# Patient Record
Sex: MALE | Race: WHITE | NOT HISPANIC OR LATINO | Employment: OTHER | URBAN - METROPOLITAN AREA
[De-identification: names, ages, dates, MRNs, and addresses within clinical notes are randomized per-mention and may not be internally consistent; named-entity substitution may affect disease eponyms.]

---

## 2017-03-21 ENCOUNTER — PATIENT OUTREACH (OUTPATIENT)
Dept: HEALTH INFORMATION MANAGEMENT | Facility: OTHER | Age: 72
End: 2017-03-21

## 2017-03-21 NOTE — PROGRESS NOTES
Attempt #: 1    Verify PCP: yes    Communication Preference Obtained: yes     Annual Wellness Visit Scheduling  1. Scheduling Status:Scheduled       Care Gap Scheduling (Attempt to Schedule EACH Overdue Care Gap!)     Health Maintenance Due   Topic Date Due   • IMM ZOSTER VACCINE  SCHEDULED   • IMM DTaP/Tdap/Td Vaccine (1 - Tdap) SCHEDULED   • COLONOSCOPY  ALREADY HAS REFERRAL, GAVE [facility] PHONE NUMBER TO PATIENT   • IMM PNEUMOCOCCAL 65+ (ADULT) LOW/MEDIUM RISK SERIES (2 of 2 - PCV13) SCHEDULED         [employer] Activation: sent activation code  Soicos Do: no  Virtual Visits: no  Opt In to Text Messages: no

## 2017-03-29 ENCOUNTER — OFFICE VISIT (OUTPATIENT)
Dept: MEDICAL GROUP | Age: 72
End: 2017-03-29
Payer: MEDICARE

## 2017-03-29 VITALS
HEIGHT: 68 IN | OXYGEN SATURATION: 96 % | TEMPERATURE: 97.2 F | WEIGHT: 193 LBS | BODY MASS INDEX: 29.25 KG/M2 | DIASTOLIC BLOOD PRESSURE: 82 MMHG | HEART RATE: 67 BPM | SYSTOLIC BLOOD PRESSURE: 140 MMHG

## 2017-03-29 DIAGNOSIS — Z12.11 SCREENING FOR COLON CANCER: ICD-10-CM

## 2017-03-29 DIAGNOSIS — Z00.00 PREVENTATIVE HEALTH CARE: ICD-10-CM

## 2017-03-29 DIAGNOSIS — Z00.00 MEDICARE ANNUAL WELLNESS VISIT, INITIAL: ICD-10-CM

## 2017-03-29 ASSESSMENT — PATIENT HEALTH QUESTIONNAIRE - PHQ9: CLINICAL INTERPRETATION OF PHQ2 SCORE: 0

## 2017-03-29 NOTE — ASSESSMENT & PLAN NOTE
Goes fishing and hunting outdoors  Does his own yardwork on an acre of land  Lives up stairs , does not want to change to downstairs  Lives with wife  Has 3 children, all visit in [facility]  Good friends near by

## 2017-03-29 NOTE — PROGRESS NOTES
This medical record contains text that has been entered with the assistance of computer voice recognition and dictation software.  Therefore, it may contain unintended errors in text, spelling, punctuation, or grammar    No chief complaint on file.      Alfonso Gaspar is a 71 y.o. male here evaluation and management of: annual wellness visit      HPI:     Preventative health care    The patient denied any chest pain, no sob, no monroy, no  pnd, no orthopnea, no headache, no changes in vision, no numbness or tingling, no nausea, no diarrhea, no abdominal pain, no fevers, no chills, no bright red blood per rectum, no  difficulty urinating, no burning during micturition, no depressed mood, no other concerns.  Goes fishing and hunting outdoors  Does his own yardwork on an acre of land  Lives up stairs , does not want to change to downstairs  Lives with wife  Has 3 children, all visit in [place] near by      Screening for colon cancer  He will have a colonoscopy next month    Medicare annual wellness visit, initial    Screening:  Depression Screening    Little interest or pleasure in doing things? Not at all  Feeling down, depressed , or hopeless? Not at all  Trouble falling or staying asleep, or sleeping too much? NO     Feeling tired or having little energy?  NO  Poor appetite or overeating?    Feeling bad about yourself - or that you are a failure or have let yourself or your family down?  NO  Trouble concentrating on things, such as reading the newspaper or watching television?  NO  Moving or speaking so slowly that other people could have noticed. Or the opposite - being so fidgety or restless that you have been moving around a lot more than usual?  NO  Thoughts that you would be better off dead, or of hurting yourself?  NO  Patient Health Questionnaire Score:  0    If depressive symptoms identified deferred to follow up visit unless specifically addressed in assesment and plan.      Screening for Cognitive  Impairment    Three Minute Recall (banana, sunrise, fence)  3/3   Draw clock face with all 12 numbers set to the hand to show 10 minures past 11 o'clock    Cognitive concerns identified defferred for follow up unless specifically addressed in assesment and plan.    Timed Up and Go Test    Time (seconds): 3    Safety Assessment    Throw rugs on floor.    Handrails on all stairs.    Good lighting in all hallways.    Difficulty hearing.    Patient counseled about all safety risks that were identified.    Functional Assessment ADLs    Are there any barriers preventing you from cooking for yourself or meeting nutritional needs?  . NONE  Are there any barriers preventing you from driving safely or obtaining transportation?  . NO  Are there any barriers preventing you from using a telephone or calling for help?  . NO  Are there any barriers preventing you from shopping?  . NO  Are there any barriers preventing you from taking care of your own finances? NO .   Are there any barriers preventing you from managing your medications?  . NO  Are currently engaing any exercise or physical activity?  . YES      Current medicines (including changes today)  Current Outpatient Prescriptions   Medication Sig Dispense Refill   • allopurinol (ZYLOPRIM) 300 MG Tab Take 1 Tab by mouth every day. TAKE 1 TAB BY MOUTH EVERY DAY. 90 Tab 3   • enalapril (VASOTEC) 10 MG Tab Take 1 Tab by mouth every day. 90 Tab 0   • Calcium Carbonate-Vitamin D (CALCIUM-D) 600-400 MG-UNIT Tab Take 2 Tabs by mouth every day.     • aspirin EC (ECOTRIN) 81 MG Tablet Delayed Response Take 81 mg by mouth every day.     • Multiple Vitamins-Minerals (MENS 50+ MULTI VITAMIN/MIN) Tab Take 1 Tab by mouth every day.     • Misc Natural Products (GLUCOSAMINE CHOND COMPLEX/MSM) Tab Take 2 Tabs by mouth every day.     • Omega-3 Fatty Acids (FISH OIL) 1000 MG Cap capsule Take 1,000 mg by mouth every day.     • clindamycin (CLEOCIN) 150 MG Cap Take 1 Cap by mouth 3 times a day.  "(Patient not taking: Reported on 2016) 40 Cap 0     No current facility-administered medications for this visit.     He  has a past medical history of Hypertension; Gout; and Gout.  He  has past surgical history that includes removal spleen, total and removal of tonsils,<12 y/o.  Social History   Substance Use Topics   • Smoking status: Former Smoker -- 0.25 packs/day for 10 years     Types: Cigarettes     Quit date: 1975   • Smokeless tobacco: Never Used   • Alcohol Use: 0.0 oz/week     0 Standard drinks or equivalent per week      Comment: occ     Social History     Social History Narrative     Family History   Problem Relation Age of Onset   • Diabetes Father    • No Known Problems Maternal Grandmother    • Cancer Maternal Grandfather      Lung cancer   • No Known Problems Paternal Grandmother    • No Known Problems Paternal Grandfather    • Other Sister      polio     Family Status   Relation Status Death Age   • Father  74     heart attack   • Mother     • Sister Alive    • Maternal Grandmother     • Maternal Grandfather     • Paternal Grandmother     • Paternal Grandfather     • Sister Alive          ROS  Please see hpi    All other systems reviewed and are negative     Objective:     Blood pressure 140/82, pulse 67, temperature 36.2 °C (97.2 °F), height 1.727 m (5' 7.99\"), weight 87.544 kg (193 lb), SpO2 96 %. Body mass index is 29.35 kg/(m^2).  Physical Exam:    Constitutional: Alert, no distress.  Skin: Warm, dry, good turgor, no rashes in visible areas.  Eye: Equal, round and reactive, conjunctiva clear, lids normal.  ENMT: Lips without lesions, good dentition, oropharynx clear.  Neck: Trachea midline, no masses, no thyromegaly. No cervical or supraclavicular lymphadenopathy.  Respiratory: Unlabored respiratory effort, lungs clear to auscultation, no wheezes, no ronchi.  Cardiovascular: Normal S1, S2, no murmur, no edema.  Abdomen: Soft, non-tender, " no masses, no hepatosplenomegaly.  Psych: Alert and oriented x3, normal affect and mood.          Assessment and Plan:   The following treatment plan was discussed, again this medical record contains text that has been entered with the assistance of computer voice recognition and dictation software.  Therefore, it may contain unintended errors in text, spelling, punctuation, or grammar    1. Medicare annual wellness visit, initial  Completed   Recommended that he move to downstairs room    2. Preventative health care  Due for AAA screening  And repeat labs  On a baby aspirin  Due for prevnar 13 and shingles    - COMP METABOLIC PANEL; Future  - CBC WITH DIFFERENTIAL; Future  - LIPID PROFILE; Future  - US-AORTA; Future    3. Screening for colon cancer  States that he is scheduled for next month              Followup: Return in about 3 months (around 6/29/2017) for Reevaluation.

## 2017-03-29 NOTE — MR AVS SNAPSHOT
"        Alfonso FULLER Hubert   3/29/2017 9:20 AM   Office Visit   MRN: [id number]    Department:  [address] [place] [address]   Dept Phone:  [phone]    Description:  Male : 1945   Provider:  Kim Greco M.D.           Allergies as of 3/29/2017     Allergen Noted Reactions    Pcn [Penicillins] 2011   Hives      You were diagnosed with     Preventative health care   [272118]       Screening for colon cancer   [948923]       Medicare annual wellness visit, initial   [406290]         Vital Signs     Blood Pressure Pulse Temperature Height Weight Body Mass Index    140/82 mmHg 67 36.2 °C (97.2 °F) 1.727 m (5' 7.99\") 87.544 kg (193 lb) 29.35 kg/m2    Oxygen Saturation Smoking Status                96% Former Smoker          Basic Information     Date Of Birth Sex Race Ethnicity Preferred Language    1945 Male White Non- English      Problem List              ICD-10-CM Priority Class Noted - Resolved    Essential hypertension, benign I10   3/26/2015 - Present    Gout M10.9   2015 - Present    Abdominal pain R10.9   2016 - Present    Screening for colon cancer Z12.11   2016 - Present    Preventative health care Z00.00   3/29/2017 - Present    Medicare annual wellness visit, initial Z00.00   3/29/2017 - Present      Health Maintenance        Date Due Completion Dates    IMM ZOSTER VACCINE 2004 ---    IMM DTaP/Tdap/Td Vaccine (1 - Tdap) 2011    COLONOSCOPY 2015    IMM PNEUMOCOCCAL 65+ (ADULT) LOW/MEDIUM RISK SERIES (2 of 2 - PCV13) 2016            Current Immunizations     Influenza TIV (IM) 2013, 2011    Influenza Vaccine Adult HD 10/4/2016, 2015    Pneumococcal polysaccharide vaccine (PPSV-23) 2015  1:02 PM    TD Vaccine 2011  2:05 PM      Below and/or attached are the medications your provider expects you to take. Review all of your home medications and newly ordered medications with your provider and/or " pharmacist. Follow medication instructions as directed by your provider and/or pharmacist. Please keep your medication list with you and share with your provider. Update the information when medications are discontinued, doses are changed, or new medications (including over-the-counter products) are added; and carry medication information at all times in the event of emergency situations     Allergies:  PCN - Hives               Medications  Valid as of: March 29, 2017 -  9:45 AM    Generic Name Brand Name Tablet Size Instructions for use    Allopurinol (Tab) ZYLOPRIM 300 MG Take 1 Tab by mouth every day. TAKE 1 TAB BY MOUTH EVERY DAY.        Aspirin (Tablet Delayed Response) ECOTRIN 81 MG Take 81 mg by mouth every day.        Calcium Carbonate-Vitamin D (Tab) Calcium-D 600-400 MG-UNIT Take 2 Tabs by mouth every day.        Clindamycin HCl (Cap) CLEOCIN 150 MG Take 1 Cap by mouth 3 times a day.        Enalapril Maleate (Tab) VASOTEC 10 MG Take 1 Tab by mouth every day.        Misc Natural Products (Tab) GLUCOSAMINE CHOND COMPLEX/MSM  Take 2 Tabs by mouth every day.        Multiple Vitamins-Minerals (Tab) MENS 50+ MULTI VITAMIN/MIN  Take 1 Tab by mouth every day.        Omega-3 Fatty Acids (Cap) fish oil 1000 MG Take 1,000 mg by mouth every day.        .                 Medicines prescribed today were sent to:     [facility] #[id number] - [place] - [address]    [address] [place] [address]    Phone: [phone] Fax: [phone]    Open 24 Hours?: No      Medication refill instructions:       If your prescription bottle indicates you have medication refills left, it is not necessary to call your provider’s office. Please contact your pharmacy and they will refill your medication.    If your prescription bottle indicates you do not have any refills left, you may request refills at any time through one of the following ways: The online [employer] system (except Urgent Care), by calling your provider’s  office, or by asking your pharmacy to contact your provider’s office with a refill request. Medication refills are processed only during regular business hours and may not be available until the next business day. Your provider may request additional information or to have a follow-up visit with you prior to refilling your medication.   *Please Note: Medication refills are assigned a new Rx number when refilled electronically. Your pharmacy may indicate that no refills were authorized even though a new prescription for the same medication is available at the pharmacy. Please request the medicine by name with the pharmacy before contacting your provider for a refill.        Your To Do List     Future Labs/Procedures Complete By Expires    CBC WITH DIFFERENTIAL  As directed 3/29/2018    COMP METABOLIC PANEL  As directed 3/29/2018    LIPID PROFILE  As directed 3/29/2018    US-AORTA  As directed 3/29/2018         [employer] Access Code: [email or web address]  Expires: 4/21/2017 11:52 AM    [employer]  A secure, online tool to manage your health information     [employer]’s [employer]® is a secure, online tool that connects you to your personalized health information from the privacy of your home -- day or night - making it very easy for you to manage your healthcare. Once the activation process is completed, you can even access your medical information using the [employer] do, which is available for free in the Apple Do store or Google Play store.     [employer] provides the following levels of access (as shown below):   My Chart Features   [facility] Primary Care Doctor [facility]  Specialists [facility]  Urgent  Care Non-[facility]  Primary Care  Doctor   Email your healthcare team securely and privately 24/7 X X X    Manage appointments: schedule your next appointment; view details of past/upcoming appointments X      Request prescription refills. X      View recent personal medical records, including lab and immunizations X X X X   View health  record, including health history, allergies, medications X X X X   Read reports about your outpatient visits, procedures, consult and ER notes X X X X   See your discharge summary, which is a recap of your hospital and/or ER visit that includes your diagnosis, lab results, and care plan. X X       How to register for [employer]:  1. Go to  [email or web address].  2. Click on the Sign Up Now box, which takes you to the New Member Sign Up page. You will need to provide the following information:  a. Enter your [employer] Access Code exactly as it appears at the top of this page. (You will not need to use this code after you’ve completed the sign-up process. If you do not sign up before the expiration date, you must request a new code.)   b. Enter your date of birth.   c. Enter your home email address.   d. Click Submit, and follow the next screen’s instructions.  3. Create a [employer] ID. This will be your [employer] login ID and cannot be changed, so think of one that is secure and easy to remember.  4. Create a [employer] password. You can change your password at any time.  5. Enter your Password Reset Question and Answer. This can be used at a later time if you forget your password.   6. Enter your e-mail address. This allows you to receive e-mail notifications when new information is available in [employer].  7. Click Sign Up. You can now view your health information.    For assistance activating your [employer] account, call [phone]

## 2017-03-29 NOTE — ASSESSMENT & PLAN NOTE
Screening:  Depression Screening    Little interest or pleasure in doing things? Not at all  Feeling down, depressed , or hopeless? Not at all  Trouble falling or staying asleep, or sleeping too much? NO     Feeling tired or having little energy?  NO  Poor appetite or overeating?    Feeling bad about yourself - or that you are a failure or have let yourself or your family down?  NO  Trouble concentrating on things, such as reading the newspaper or watching television?  NO  Moving or speaking so slowly that other people could have noticed. Or the opposite - being so fidgety or restless that you have been moving around a lot more than usual?  NO  Thoughts that you would be better off dead, or of hurting yourself?  NO  Patient Health Questionnaire Score:  0    If depressive symptoms identified deferred to follow up visit unless specifically addressed in assesment and plan.      Screening for Cognitive Impairment    Three Minute Recall (banana, sunrise, fence)  3/3   Draw clock face with all 12 numbers set to the hand to show 10 minures past 11 o'clock    Cognitive concerns identified defferred for follow up unless specifically addressed in assesment and plan.    Timed Up and Go Test    Time (seconds): 2    Safety Assessment    Throw rugs on floor.    Handrails on all stairs.    Good lighting in all hallways.    Difficulty hearing.    Patient counseled about all safety risks that were identified.    Functional Assessment ADLs    Are there any barriers preventing you from cooking for yourself or meeting nutritional needs?  . NONE  Are there any barriers preventing you from driving safely or obtaining transportation?  . NO  Are there any barriers preventing you from using a telephone or calling for help?  . NO  Are there any barriers preventing you from shopping?  . NO  Are there any barriers preventing you from taking care of your own finances? NO .   Are there any barriers preventing you from managing your  medications?  . NO  Are currently engaing any exercise or physical activity?  . YES

## 2017-04-07 ENCOUNTER — HOSPITAL ENCOUNTER (OUTPATIENT)
Dept: LAB | Facility: MEDICAL CENTER | Age: 72
End: 2017-04-07
Attending: FAMILY MEDICINE
Payer: MEDICARE

## 2017-04-07 DIAGNOSIS — Z00.00 PREVENTATIVE HEALTH CARE: ICD-10-CM

## 2017-04-07 LAB
ALBUMIN SERPL BCP-MCNC: 3.8 G/DL (ref 3.2–4.9)
ALBUMIN/GLOB SERPL: 1.3 G/DL
ALP SERPL-CCNC: 61 U/L (ref 30–99)
ALT SERPL-CCNC: 19 U/L (ref 2–50)
ANION GAP SERPL CALC-SCNC: 7 MMOL/L (ref 0–11.9)
AST SERPL-CCNC: 20 U/L (ref 12–45)
BASOPHILS # BLD AUTO: 0.3 % (ref 0–1.8)
BASOPHILS # BLD: 0.02 K/UL (ref 0–0.12)
BILIRUB SERPL-MCNC: 0.7 MG/DL (ref 0.1–1.5)
BUN SERPL-MCNC: 20 MG/DL (ref 8–22)
CALCIUM SERPL-MCNC: 9.1 MG/DL (ref 8.5–10.5)
CHLORIDE SERPL-SCNC: 106 MMOL/L (ref 96–112)
CHOLEST SERPL-MCNC: 213 MG/DL (ref 100–199)
CO2 SERPL-SCNC: 24 MMOL/L (ref 20–33)
CREAT SERPL-MCNC: 0.68 MG/DL (ref 0.5–1.4)
EOSINOPHIL # BLD AUTO: 0.09 K/UL (ref 0–0.51)
EOSINOPHIL NFR BLD: 1.5 % (ref 0–6.9)
ERYTHROCYTE [DISTWIDTH] IN BLOOD BY AUTOMATED COUNT: 54.2 FL (ref 35.9–50)
GFR SERPL CREATININE-BSD FRML MDRD: >60 ML/MIN/1.73 M 2
GLOBULIN SER CALC-MCNC: 3 G/DL (ref 1.9–3.5)
GLUCOSE SERPL-MCNC: 102 MG/DL (ref 65–99)
HCT VFR BLD AUTO: 45.3 % (ref 42–52)
HDLC SERPL-MCNC: 46 MG/DL
HGB BLD-MCNC: 15.6 G/DL (ref 14–18)
IMM GRANULOCYTES # BLD AUTO: 0.01 K/UL (ref 0–0.11)
IMM GRANULOCYTES NFR BLD AUTO: 0.2 % (ref 0–0.9)
LDLC SERPL CALC-MCNC: 146 MG/DL
LYMPHOCYTES # BLD AUTO: 3.52 K/UL (ref 1–4.8)
LYMPHOCYTES NFR BLD: 58.8 % (ref 22–41)
MCH RBC QN AUTO: 33.2 PG (ref 27–33)
MCHC RBC AUTO-ENTMCNC: 34.4 G/DL (ref 33.7–35.3)
MCV RBC AUTO: 96.4 FL (ref 81.4–97.8)
MONOCYTES # BLD AUTO: 0.32 K/UL (ref 0–0.85)
MONOCYTES NFR BLD AUTO: 5.3 % (ref 0–13.4)
NEUTROPHILS # BLD AUTO: 2.03 K/UL (ref 1.82–7.42)
NEUTROPHILS NFR BLD: 33.9 % (ref 44–72)
NRBC # BLD AUTO: 0 K/UL
NRBC BLD AUTO-RTO: 0 /100 WBC
PLATELET # BLD AUTO: 278 K/UL (ref 164–446)
PMV BLD AUTO: 10.1 FL (ref 9–12.9)
POTASSIUM SERPL-SCNC: 4.2 MMOL/L (ref 3.6–5.5)
PROT SERPL-MCNC: 6.8 G/DL (ref 6–8.2)
RBC # BLD AUTO: 4.7 M/UL (ref 4.7–6.1)
SODIUM SERPL-SCNC: 137 MMOL/L (ref 135–145)
TRIGL SERPL-MCNC: 106 MG/DL (ref 0–149)
WBC # BLD AUTO: 6 K/UL (ref 4.8–10.8)

## 2017-04-07 PROCEDURE — 80061 LIPID PANEL: CPT | Mod: GY

## 2017-04-07 PROCEDURE — 36415 COLL VENOUS BLD VENIPUNCTURE: CPT | Mod: GY

## 2017-04-07 PROCEDURE — 85025 COMPLETE CBC W/AUTO DIFF WBC: CPT | Mod: GY

## 2017-04-07 PROCEDURE — 80053 COMPREHEN METABOLIC PANEL: CPT | Mod: GY

## 2017-04-10 ENCOUNTER — TELEPHONE (OUTPATIENT)
Dept: MEDICAL GROUP | Age: 72
End: 2017-04-10

## 2017-04-10 NOTE — TELEPHONE ENCOUNTER
----- Message from Kim Greco M.D. sent at 4/10/2017  7:07 AM PDT -----  Hi Alfonso       Your labs show that you are in the PRE-Diabetes level also known as impaired fasting glucose (IFG).  Also you cholesterol level was mildly elevated,Changes in lifestyle, including diet modification, weight loss, and exercise slow progression of IFG to overt diabetes. Let's give you 3 months of life some modification and then repeat the labs.    Lance Greco MD  [employer], [facility]  [address] [place] [address]          Lance Greco MD  Diplomat, [place] [phone]

## 2017-04-10 NOTE — TELEPHONE ENCOUNTER
Phone Number Called: [phone] (home)     Message: Left VM for patient to return our call regarding lab results.    Left Message for patient to call back: yes

## 2017-04-11 NOTE — TELEPHONE ENCOUNTER
Phone Number Called: [phone] (home)     Message: Left message for patient to call us back, 2nd attempt    Left Message for patient to call back: yes

## 2017-04-14 NOTE — TELEPHONE ENCOUNTER
Phone Number Called: [phone]    Message: Left message for pt to call back.    Left Message for patient to call back: yes

## 2017-04-17 NOTE — TELEPHONE ENCOUNTER
[facility] ONLY    Phone Number Called: [phone] (home)     Message: Unable to contact patient, letter mailed to home address with Dr. Greco's message regarding lab results below.    Left Message for patient to call back: no

## 2017-04-19 ENCOUNTER — HOSPITAL ENCOUNTER (OUTPATIENT)
Dept: RADIOLOGY | Facility: MEDICAL CENTER | Age: 72
End: 2017-04-19
Attending: FAMILY MEDICINE
Payer: MEDICARE

## 2017-04-19 DIAGNOSIS — Z00.00 PREVENTATIVE HEALTH CARE: ICD-10-CM

## 2017-04-19 PROCEDURE — 93978 VASCULAR STUDY: CPT

## 2017-04-19 PROCEDURE — 93978 VASCULAR STUDY: CPT | Mod: 26 | Performed by: SURGERY

## 2017-04-21 ENCOUNTER — TELEPHONE (OUTPATIENT)
Dept: MEDICAL GROUP | Age: 72
End: 2017-04-21

## 2017-04-21 NOTE — TELEPHONE ENCOUNTER
----- Message from Kim Greco M.D. sent at 4/20/2017  7:24 AM PDT -----  Humphrey Hamilton,      Your ultrasound showed no aortic aneurysm.      Lance Greco MD  [facility], [facility]  [address] [place] [id number]

## 2017-04-21 NOTE — TELEPHONE ENCOUNTER
Phone Number Called: [phone]    Message: Pt notified of results as stated below in Dr. Greco's note, he acknowledged.    Left Message for patient to call back: N\A

## 2017-05-18 ENCOUNTER — RX ONLY (OUTPATIENT)
Age: 72
Setting detail: RX ONLY
End: 2017-05-18

## 2017-07-19 ENCOUNTER — OFFICE VISIT (OUTPATIENT)
Dept: MEDICAL GROUP | Age: 72
End: 2017-07-19
Payer: MEDICARE

## 2017-07-19 VITALS
DIASTOLIC BLOOD PRESSURE: 80 MMHG | WEIGHT: 192.8 LBS | BODY MASS INDEX: 29.22 KG/M2 | HEIGHT: 68 IN | SYSTOLIC BLOOD PRESSURE: 136 MMHG | TEMPERATURE: 97.7 F | OXYGEN SATURATION: 95 % | HEART RATE: 97 BPM

## 2017-07-19 DIAGNOSIS — L57.0 AK (ACTINIC KERATOSIS): ICD-10-CM

## 2017-07-19 DIAGNOSIS — I10 ESSENTIAL HYPERTENSION, BENIGN: ICD-10-CM

## 2017-07-19 DIAGNOSIS — Z00.00 PREVENTATIVE HEALTH CARE: ICD-10-CM

## 2017-07-19 DIAGNOSIS — L82.0 INFLAMED SEBORRHEIC KERATOSIS: ICD-10-CM

## 2017-07-19 DIAGNOSIS — Z23 NEED FOR VACCINATION: ICD-10-CM

## 2017-07-19 DIAGNOSIS — M10.00 ACUTE IDIOPATHIC GOUT, UNSPECIFIED SITE: ICD-10-CM

## 2017-07-19 DIAGNOSIS — Z12.11 SCREENING FOR COLON CANCER: ICD-10-CM

## 2017-07-19 PROCEDURE — 17003 DESTRUCT PREMALG LES 2-14: CPT | Performed by: FAMILY MEDICINE

## 2017-07-19 PROCEDURE — G0009 ADMIN PNEUMOCOCCAL VACCINE: HCPCS | Performed by: FAMILY MEDICINE

## 2017-07-19 PROCEDURE — 17000 DESTRUCT PREMALG LESION: CPT | Mod: 59 | Performed by: FAMILY MEDICINE

## 2017-07-19 PROCEDURE — 99214 OFFICE O/P EST MOD 30 MIN: CPT | Mod: 25 | Performed by: FAMILY MEDICINE

## 2017-07-19 PROCEDURE — 17110 DESTRUCTION B9 LES UP TO 14: CPT | Performed by: FAMILY MEDICINE

## 2017-07-19 PROCEDURE — 90670 PCV13 VACCINE IM: CPT | Performed by: FAMILY MEDICINE

## 2017-07-19 NOTE — ASSESSMENT & PLAN NOTE
The patient has not had a gout attack since 2000, he states the allopurinol prevents this from happening. When he does Gout attack occasionally happens at multiple sites including ankle and elbows as well as knees.

## 2017-07-19 NOTE — PROGRESS NOTES
This medical record contains text that has been entered with the assistance of computer voice recognition and dictation software.  Therefore, it may contain unintended errors in text, spelling, punctuation, or grammar    Chief Complaint   Patient presents with   • Other     see reason for visit       Alfonso Gaspar is a 72 y.o. male here evaluation and management of: HTN, labs, gout, ISK      HPI:     Screening for colon cancer  Normal colonoscopy in March, 2017     health care  The patient is a very pleasant 72-year-old male who returns to clinic for routine follow-up. He has a significant past medical history of hypertension, multiple skin moles, history of blood in the stool with a normal repeat colonoscopy.   The patient denied any chest pain, no sob, no monroy, no  pnd, no orthopnea, no headache, no changes in vision, no numbness or tingling, no nausea, no diarrhea, no abdominal pain, no fevers, no chills, no bright red blood per rectum, no  difficulty urinating, no burning during micturition, no depressed mood, no other concerns.    He lives with his wife  He has a 6 year old grand daughter, they walk one mile daily  He also hikes and does gardening        Essential hypertension, benign  The patient is a 72-year-old male who continues to comply with enalapril 10 mg daily. He is on a baby aspirin, but no statin medication.  The patient has been tollerating the BP meds without any issues. No tunnel vision, no cough, no changes in vision, no lightheadedness, no fatigue, no syncopal or presyncopal episodes, no edema, no new rashes.     Gout  The patient has not had a gout attack since 2000, he states the allopurinol prevents this from happening. When he does Gout attack occasionally happens at multiple sites including ankle and elbows as well as knees.    Inflamed seborrheic keratosis  Patient has been complaining that these oval lesions have been irritating, flaky, causing discomfort              Current  medicines (including changes today)  Current Outpatient Prescriptions   Medication Sig Dispense Refill   • Misc. Devices Misc Please provide patient with shingles vaccine as well as TDAP 1 Application 0   • allopurinol (ZYLOPRIM) 300 MG Tab Take 1 Tab by mouth every day. TAKE 1 TAB BY MOUTH EVERY DAY. 90 Tab 3   • enalapril (VASOTEC) 10 MG Tab Take 1 Tab by mouth every day. 90 Tab 0   • Calcium Carbonate-Vitamin D (CALCIUM-D) 600-400 MG-UNIT Tab Take 2 Tabs by mouth every day.     • aspirin EC (ECOTRIN) 81 MG Tablet Delayed Response Take 81 mg by mouth every day.     • Multiple Vitamins-Minerals (MENS 50+ MULTI VITAMIN/MIN) Tab Take 1 Tab by mouth every day.     • Misc Natural Products (GLUCOSAMINE CHOND COMPLEX/MSM) Tab Take 2 Tabs by mouth every day.     • Omega-3 Fatty Acids (FISH OIL) 1000 MG Cap capsule Take 1,000 mg by mouth every day.     • clindamycin (CLEOCIN) 150 MG Cap Take 1 Cap by mouth 3 times a day. (Patient not taking: Reported on 2016) 40 Cap 0     No current facility-administered medications for this visit.     He  has a past medical history of Hypertension; Gout; and Gout.  He  has past surgical history that includes removal spleen, total and removal of tonsils,<13 y/o.  Social History   Substance Use Topics   • Smoking status: Former Smoker -- 0.25 packs/day for 10 years     Types: Cigarettes     Quit date: 1975   • Smokeless tobacco: Never Used   • Alcohol Use: 0.0 oz/week     0 Standard drinks or equivalent per week      Comment: occ     Social History     Social History Narrative     Family History   Problem Relation Age of Onset   • Diabetes Father    • No Known Problems Maternal Grandmother    • Cancer Maternal Grandfather      Lung cancer   • No Known Problems Paternal Grandmother    • No Known Problems Paternal Grandfather    • Other Sister      polio     Family Status   Relation Status Death Age   • Father  74     heart attack   • Mother     • Sister Alive    •  "Maternal Grandmother     • Maternal Grandfather     • Paternal Grandmother     • Paternal Grandfather     • Sister Alive          ROS    Please see hpi     All other systems reviewed and are negative     Objective:     Blood pressure 136/80, pulse 97, temperature 36.5 °C (97.7 °F), height 1.727 m (5' 7.99\"), weight 87.454 kg (192 lb 12.8 oz), SpO2 95 %. Body mass index is 29.32 kg/(m^2).  Physical Exam:    SKIN EXAM  Several well-demarcated, round or oval lesions with a dull, verrucous surface and a typical stuck-on appearance on back and chest  multiple lesions on bilateral forearms, hands, and chest, with evidence of of solar damage present , spotty hyperpigmentation, scattered telangiectasias, and  Xerosis    PROCEDURE: CRYOTHERAPY  Discussed risks and benefits of cryotherapy. Patient verbally agreed. 2  applications of cryotherapy were applied to all lesions 4 AK's and  6 SK's. Patient tolerated procedure well. Aftercare instructions given.    Eye: Equal, round and reactive, conjunctiva clear, lids normal.  ENMT: Lips without lesions, good dentition, oropharynx clear.  Neck: Trachea midline, no masses, no thyromegaly. No cervical or supraclavicular lymphadenopathy.  Respiratory: Unlabored respiratory effort, lungs clear to auscultation, no wheezes, no ronchi.  Cardiovascular: Normal S1, S2, no murmur, no edema.  Abdomen: Soft, non-tender, no masses, no hepatosplenomegaly.  Psych: Alert and oriented x3, normal affect and mood.      Assessment and Plan:   The following treatment plan was discussed      1. Need for vaccination  Given today    - Pneumococcal Conjugate Vaccine 13-Valent <4yo IM  - Misc. Devices Misc; Please provide patient with shingles vaccine as well as TDAP  Dispense: 1 Application; Refill: 0    2. Screening for colon cancer  He had a normal colonoscopy in     3. Preventative health care  Due for repeat labs  As well as shingles and Tdap    4. AK (actinic " keratosis)  Patient tollerrated procedure well  There were no adverse events  Patient was given post procedure precautions       5. Inflamed seborrheic keratosis  Patient tollerrated procedure well  There were no adverse events  Patient was given post procedure precautions       6. Essential hypertension, benign  I explained to the patient that the most common cause of death in [place] in both Male and Females was Acute MI and the most common risk factor for MI was hypertension.  The Patient was counseled on aggressive life style modifications such as running at least 20minutes per day 3 times per wk, and decreasing Sodium intake,      In addition to pharmacotherapy we discussed  lifestyle modification…#1. Maintain normal weight (BMI 18.5 to 24.kg/m2), #2 DASH diet, #3 Decrease Sodium intake to less than 100meq/day (2.4g Na or 6g NaCL), #4 increase physical activity, #5 Limit Etoh consumption to 2 drinks/day in men and 1 drink per day in women.   - COMP METABOLIC PANEL; Future  - CBC WITH DIFFERENTIAL; Future  - LIPID PROFILE; Future    7. Acute idiopathic gout, unspecified site  Patient has been stable with current management  We will make no changes for now        HEALTH MAINTENANCE: due for Tdap and shingles vaccination    Instructed to Follow up in clinic or ER for worsening symptoms, difficulty breathing, lack of expected recovery, or should new symptoms or problems arise.    Followup: Return in about 4 weeks (around 8/16/2017) for Reevaluation, Cryotherapy.       Once again this medical record contains text that has been entered with the assistance of computer voice recognition and dictation software.  Therefore, it may contain unintended errors in text, spelling, punctuation, or grammar

## 2017-07-19 NOTE — ASSESSMENT & PLAN NOTE
The patient is a 72-year-old male who continues to comply with enalapril 10 mg daily. He is on a baby aspirin, but no statin medication.  The patient has been tollerating the BP meds without any issues. No tunnel vision, no cough, no changes in vision, no lightheadedness, no fatigue, no syncopal or presyncopal episodes, no edema, no new rashes.

## 2017-07-19 NOTE — MR AVS SNAPSHOT
"Alfonso Gaspar   2017 1:20 PM   Office Visit   MRN: [id number]    Department:  [address] [place] [address]   Dept Phone:  [phone]    Description:  Male : 1945   Provider:  Kim Greco M.D.           Reason for Visit     Other see reason for visit      Allergies as of 2017     Allergen Noted Reactions    Pcn [Penicillins] 2011   Hives      You were diagnosed with     Need for vaccination   [652711]       Screening for colon cancer   [724367]       Preventative health care   [343509]       AK (actinic keratosis)   [219945]       Inflamed seborrheic keratosis   [702.11.ICD-9-CM]       Essential hypertension, benign   [401.1.ICD-9-CM]       Acute idiopathic gout, unspecified site   [6384974]         Vital Signs     Blood Pressure Pulse Temperature Height Weight Body Mass Index    136/80 mmHg 97 36.5 °C (97.7 °F) 1.727 m (5' 7.99\") 87.454 kg (192 lb 12.8 oz) 29.32 kg/m2    Oxygen Saturation Smoking Status                95% Former Smoker          Basic Information     Date Of Birth Sex Race Ethnicity Preferred Language    1945 Male White Non- English      Problem List              ICD-10-CM Priority Class Noted - Resolved    Essential hypertension, benign I10   3/26/2015 - Present    Gout M10.9   2015 - Present    Abdominal pain R10.9   2016 - Present    Screening for colon cancer Z12.11   2016 - Present    Preventative health care Z00.00   3/29/2017 - Present    Medicare annual wellness visit, initial Z00.00   3/29/2017 - Present    AK (actinic keratosis) L57.0   2017 - Present    Inflamed seborrheic keratosis L82.0   2017 - Present      Health Maintenance        Date Due Completion Dates    IMM ZOSTER VACCINE 2004 ---    IMM DTaP/Tdap/Td Vaccine (1 - Tdap) 2011    IMM INFLUENZA (1) 2017 10/4/2016, 2015, 2013, 2011    COLONOSCOPY 2026 (Done), 2005    Override on 2016: Done (also had a " normal repeat colonoscopy in March, 2017 per patient, records pending)            Current Immunizations     13-VALENT PCV PREVNAR 7/19/2017    Influenza TIV (IM) 9/30/2013, 9/26/2011    Influenza Vaccine Adult HD 10/4/2016, 9/21/2015    Pneumococcal polysaccharide vaccine (PPSV-23) 12/1/2015  1:02 PM    TD Vaccine 9/17/2011  2:05 PM      Below and/or attached are the medications your provider expects you to take. Review all of your home medications and newly ordered medications with your provider and/or pharmacist. Follow medication instructions as directed by your provider and/or pharmacist. Please keep your medication list with you and share with your provider. Update the information when medications are discontinued, doses are changed, or new medications (including over-the-counter products) are added; and carry medication information at all times in the event of emergency situations     Allergies:  PCN - Hives               Medications  Valid as of: July 19, 2017 -  2:34 PM    Generic Name Brand Name Tablet Size Instructions for use    Allopurinol (Tab) ZYLOPRIM 300 MG Take 1 Tab by mouth every day. TAKE 1 TAB BY MOUTH EVERY DAY.        Aspirin (Tablet Delayed Response) ECOTRIN 81 MG Take 81 mg by mouth every day.        Calcium Carbonate-Vitamin D (Tab) Calcium-D 600-400 MG-UNIT Take 2 Tabs by mouth every day.        Clindamycin HCl (Cap) CLEOCIN 150 MG Take 1 Cap by mouth 3 times a day.        Enalapril Maleate (Tab) VASOTEC 10 MG Take 1 Tab by mouth every day.        Misc Natural Products (Tab) GLUCOSAMINE CHOND COMPLEX/MSM  Take 2 Tabs by mouth every day.        Misc. Devices (Misc) Misc. Devices  Please provide patient with shingles vaccine as well as TDAP        Multiple Vitamins-Minerals (Tab) MENS 50+ MULTI VITAMIN/MIN  Take 1 Tab by mouth every day.        Omega-3 Fatty Acids (Cap) fish oil 1000 MG Take 1,000 mg by mouth every day.        .                 Medicines prescribed today were sent to:      [id number] - [place], [place] - [address]    [address] [place] [address]    Phone: [phone] Fax: [phone]    Open 24 Hours?: No      Medication refill instructions:       If your prescription bottle indicates you have medication refills left, it is not necessary to call your provider’s office. Please contact your pharmacy and they will refill your medication.    If your prescription bottle indicates you do not have any refills left, you may request refills at any time through one of the following ways: The online Room Choice system (except Urgent Care), by calling your provider’s office, or by asking your pharmacy to contact your provider’s office with a refill request. Medication refills are processed only during regular business hours and may not be available until the next business day. Your provider may request additional information or to have a follow-up visit with you prior to refilling your medication.   *Please Note: Medication refills are assigned a new Rx number when refilled electronically. Your pharmacy may indicate that no refills were authorized even though a new prescription for the same medication is available at the pharmacy. Please request the medicine by name with the pharmacy before contacting your provider for a refill.        Your To Do List     Future Labs/Procedures Complete By Expires    CBC WITH DIFFERENTIAL  As directed 7/19/2018    COMP METABOLIC PANEL  As directed 7/19/2018    LIPID PROFILE  As directed 7/19/2018         Room Choice Access Code: [email or web address]  Expires: 8/15/2017  4:12 AM    Room Choice  A secure, online tool to manage your health information     [email or web address]’s Room Choice® is a secure, online tool that connects you to your personalized health information from the privacy of your home -- day or night - making it very easy for you to manage your healthcare. Once the activation process is completed, you can even access your medical information using the  [facility] Room do, which is available for free in the Apple Do store or Google Play store.     [facility] Room provides the following levels of access (as shown below):   My Chart Features   [facility] Doctor [facility]  Specialists [facility]  Urgent  Care Non-[facility]  Primary Care  Doctor   Email your healthcare team securely and privately 24/7 X X X    Manage appointments: schedule your next appointment; view details of past/upcoming appointments X      Request prescription refills. X      View recent personal medical records, including lab and immunizations X X X X   View health record, including health history, allergies, medications X X X X   Read reports about your outpatient visits, procedures, consult and ER notes X X X X   See your discharge summary, which is a recap of your hospital and/or ER visit that includes your diagnosis, lab results, and care plan. X X       How to register for [facility]ada Room:  1. Go to  [email or web address].  2. Click on the Sign Up Now box, which takes you to the New Member Sign Up page. You will need to provide the following information:  a. Enter your Dada Room Access Code exactly as it appears at the top of this page. (You will not need to use this code after you’ve completed the sign-up process. If you do not sign up before the expiration date, you must request a new code.)   b. Enter your date of birth.   c. Enter your home email address.   d. Click Submit, and follow the next screen’s instructions.  3. Create a Dada Room ID. This will be your Dada Room login ID and cannot be changed, so think of one that is secure and easy to remember.  4. Create a Dada Room password. You can change your password at any time.  5. Enter your Password Reset Question and Answer. This can be used at a later time if you forget your password.   6. Enter your e-mail address. This allows you to receive e-mail notifications when new information is available in Dada Room.  7. Click Sign Up. You can now view your health  information.    For assistance activating your [email or web address] account, call [phone]

## 2017-07-19 NOTE — ASSESSMENT & PLAN NOTE
The patient is a very pleasant 72-year-old male who returns to clinic for routine follow-up. He has a significant past medical history of hypertension, multiple skin moles, history of blood in the stool with a normal repeat colonoscopy.   The patient denied any chest pain, no sob, no monroy, no  pnd, no orthopnea, no headache, no changes in vision, no numbness or tingling, no nausea, no diarrhea, no abdominal pain, no fevers, no chills, no bright red blood per rectum, no  difficulty urinating, no burning during micturition, no depressed mood, no other concerns.    He lives with his wife  He has a 6 year old grand daughter, they walk one mile daily  He also hikes and does gardening

## 2017-08-02 ENCOUNTER — HOSPITAL ENCOUNTER (OUTPATIENT)
Dept: LAB | Facility: MEDICAL CENTER | Age: 72
End: 2017-08-02
Attending: FAMILY MEDICINE
Payer: MEDICARE

## 2017-08-02 DIAGNOSIS — I10 ESSENTIAL HYPERTENSION, BENIGN: ICD-10-CM

## 2017-08-02 LAB
ALBUMIN SERPL BCP-MCNC: 3.8 G/DL (ref 3.2–4.9)
ALBUMIN/GLOB SERPL: 1.2 G/DL
ALP SERPL-CCNC: 61 U/L (ref 30–99)
ALT SERPL-CCNC: 18 U/L (ref 2–50)
ANION GAP SERPL CALC-SCNC: 8 MMOL/L (ref 0–11.9)
AST SERPL-CCNC: 22 U/L (ref 12–45)
BASOPHILS # BLD AUTO: 0.5 % (ref 0–1.8)
BASOPHILS # BLD: 0.03 K/UL (ref 0–0.12)
BILIRUB SERPL-MCNC: 1 MG/DL (ref 0.1–1.5)
BUN SERPL-MCNC: 21 MG/DL (ref 8–22)
CALCIUM SERPL-MCNC: 9.2 MG/DL (ref 8.5–10.5)
CHLORIDE SERPL-SCNC: 106 MMOL/L (ref 96–112)
CHOLEST SERPL-MCNC: 209 MG/DL (ref 100–199)
CO2 SERPL-SCNC: 23 MMOL/L (ref 20–33)
CREAT SERPL-MCNC: 0.65 MG/DL (ref 0.5–1.4)
EOSINOPHIL # BLD AUTO: 0.18 K/UL (ref 0–0.51)
EOSINOPHIL NFR BLD: 2.8 % (ref 0–6.9)
ERYTHROCYTE [DISTWIDTH] IN BLOOD BY AUTOMATED COUNT: 55 FL (ref 35.9–50)
GFR SERPL CREATININE-BSD FRML MDRD: >60 ML/MIN/1.73 M 2
GLOBULIN SER CALC-MCNC: 3.1 G/DL (ref 1.9–3.5)
GLUCOSE SERPL-MCNC: 93 MG/DL (ref 65–99)
HCT VFR BLD AUTO: 45.8 % (ref 42–52)
HDLC SERPL-MCNC: 51 MG/DL
HGB BLD-MCNC: 15.9 G/DL (ref 14–18)
IMM GRANULOCYTES # BLD AUTO: 0.02 K/UL (ref 0–0.11)
IMM GRANULOCYTES NFR BLD AUTO: 0.3 % (ref 0–0.9)
LDLC SERPL CALC-MCNC: 143 MG/DL
LYMPHOCYTES # BLD AUTO: 3.63 K/UL (ref 1–4.8)
LYMPHOCYTES NFR BLD: 57.3 % (ref 22–41)
MCH RBC QN AUTO: 33.8 PG (ref 27–33)
MCHC RBC AUTO-ENTMCNC: 34.7 G/DL (ref 33.7–35.3)
MCV RBC AUTO: 97.4 FL (ref 81.4–97.8)
MONOCYTES # BLD AUTO: 0.37 K/UL (ref 0–0.85)
MONOCYTES NFR BLD AUTO: 5.8 % (ref 0–13.4)
NEUTROPHILS # BLD AUTO: 2.11 K/UL (ref 1.82–7.42)
NEUTROPHILS NFR BLD: 33.3 % (ref 44–72)
NRBC # BLD AUTO: 0.02 K/UL
NRBC BLD AUTO-RTO: 0.3 /100 WBC
PLATELET # BLD AUTO: 289 K/UL (ref 164–446)
PMV BLD AUTO: 10.3 FL (ref 9–12.9)
POTASSIUM SERPL-SCNC: 4.4 MMOL/L (ref 3.6–5.5)
PROT SERPL-MCNC: 6.9 G/DL (ref 6–8.2)
RBC # BLD AUTO: 4.7 M/UL (ref 4.7–6.1)
SODIUM SERPL-SCNC: 137 MMOL/L (ref 135–145)
TRIGL SERPL-MCNC: 77 MG/DL (ref 0–149)
WBC # BLD AUTO: 6.3 K/UL (ref 4.8–10.8)

## 2017-08-02 PROCEDURE — 85025 COMPLETE CBC W/AUTO DIFF WBC: CPT

## 2017-08-02 PROCEDURE — 80053 COMPREHEN METABOLIC PANEL: CPT

## 2017-08-02 PROCEDURE — 80061 LIPID PANEL: CPT

## 2017-08-02 PROCEDURE — 36415 COLL VENOUS BLD VENIPUNCTURE: CPT

## 2017-09-19 ENCOUNTER — OFFICE VISIT (OUTPATIENT)
Dept: MEDICAL GROUP | Age: 72
End: 2017-09-19
Payer: MEDICARE

## 2017-09-19 VITALS
WEIGHT: 194.6 LBS | OXYGEN SATURATION: 97 % | HEART RATE: 65 BPM | HEIGHT: 70 IN | SYSTOLIC BLOOD PRESSURE: 140 MMHG | DIASTOLIC BLOOD PRESSURE: 86 MMHG | TEMPERATURE: 97.3 F | BODY MASS INDEX: 27.86 KG/M2

## 2017-09-19 DIAGNOSIS — G89.29 CHRONIC NECK PAIN: ICD-10-CM

## 2017-09-19 DIAGNOSIS — Z98.890 S/P COLONOSCOPY: ICD-10-CM

## 2017-09-19 DIAGNOSIS — M54.2 CHRONIC NECK PAIN: ICD-10-CM

## 2017-09-19 DIAGNOSIS — Z12.11 SCREENING FOR COLON CANCER: ICD-10-CM

## 2017-09-19 DIAGNOSIS — M1A.09X0 IDIOPATHIC CHRONIC GOUT OF MULTIPLE SITES WITHOUT TOPHUS: ICD-10-CM

## 2017-09-19 DIAGNOSIS — I10 ESSENTIAL HYPERTENSION, BENIGN: ICD-10-CM

## 2017-09-19 PROCEDURE — 99204 OFFICE O/P NEW MOD 45 MIN: CPT | Performed by: INTERNAL MEDICINE

## 2017-09-19 ASSESSMENT — ENCOUNTER SYMPTOMS
CARDIOVASCULAR NEGATIVE: 1
MUSCULOSKELETAL NEGATIVE: 1
EYES NEGATIVE: 1
PSYCHIATRIC NEGATIVE: 1
NEUROLOGICAL NEGATIVE: 1
GASTROINTESTINAL NEGATIVE: 1
CONSTITUTIONAL NEGATIVE: 1
RESPIRATORY NEGATIVE: 1

## 2017-09-19 NOTE — LETTER
[facility]  Kim Greco M.D.  [address]  [place] [address]  Fax: [phone]   Authorization for Release/Disclosure of   Protected Health Information   Name: ALFONSO GASPAR : 1945 SSN: [id number]   Address: [address] [place] [address]  [place] [address] Phone:    [phone] (home)    I authorize the entity listed below to release/disclose the PHI below to:   [facility]/Kim Greco M.D. and Jerry Jarrett M.D.   Provider or Entity Name:  [facility]   Address   City, State, [place], [place] Phone:      Fax:     Reason for request: continuity of care   Information to be released:    [ x ] LAST COLONOSCOPY,  including any PATH REPORT and follow-up  [  ] LAST FIT/COLOGUARD RESULT [  ] LAST DEXA  [  ] LAST MAMMOGRAM  [  ] LAST PAP  [  ] LAST LABS [  ] RETINA EXAM REPORT  [  ] IMMUNIZATION RECORDS  [  ] Release all info      [  ] Check here and initial the line next to each item to release ALL health information INCLUDING  _____ Care and treatment for drug and / or alcohol abuse  _____ HIV testing, infection status, or AIDS  _____ Genetic Testing    DATES OF SERVICE OR TIME PERIOD TO BE DISCLOSED: _____________  I understand and acknowledge that:  * This Authorization may be revoked at any time by you in writing, except if your health information has already been used or disclosed.  * Your health information that will be used or disclosed as a result of you signing this authorization could be re-disclosed by the recipient. If this occurs, your re-disclosed health information may no longer be protected by State or Federal laws.  * You may refuse to sign this Authorization. Your refusal will not affect your ability to obtain treatment.  * This Authorization becomes effective upon signing and will  on (date) __________.      If no date is indicated, this Authorization will  one (1) year from the signature date.    Name: Alfonso Gaspar    Signature:   Date:     2017       PLEASE FAX  REQUESTED RECORDS BACK TO: [phone]

## 2017-09-20 NOTE — PROGRESS NOTES
Subjective:      Alfonso Gaspar is a 72 y.o. male who presents with Neck Pain (possible infection - right side x 2 weeks - has had in the past)  and  The patient is here for followup of chronic medical problems listed below. The patient is compliant with medications and having no side effects from them. Denies chest pain, abdominal pain, dyspnea, myalgias, or cough.   Patient Active Problem List    Diagnosis Date Noted   • S/P colonoscopy- 2016 [facility] 09/19/2017   • AK (actinic keratosis) 07/19/2017   • Inflamed seborrheic keratosis 07/19/2017   • Preventative health care 03/29/2017   • Medicare annual wellness visit, initial 03/29/2017   • Screening for colon cancer 09/14/2016   • Idiopathic chronic gout of multiple sites without tophus 05/04/2015   • Essential hypertension, benign 03/26/2015     Allergies   Allergen Reactions   • Pcn [Penicillins] Hives     Outpatient Medications Prior to Visit   Medication Sig Dispense Refill   • allopurinol (ZYLOPRIM) 300 MG Tab Take 1 Tab by mouth every day. TAKE 1 TAB BY MOUTH EVERY DAY. 90 Tab 3   • enalapril (VASOTEC) 10 MG Tab Take 1 Tab by mouth every day. 90 Tab 0   • Calcium Carbonate-Vitamin D (CALCIUM-D) 600-400 MG-UNIT Tab Take 2 Tabs by mouth every day.     • aspirin EC (ECOTRIN) 81 MG Tablet Delayed Response Take 81 mg by mouth every day.     • Multiple Vitamins-Minerals (MENS 50+ MULTI VITAMIN/MIN) Tab Take 1 Tab by mouth every day.     • Misc Natural Products (GLUCOSAMINE CHOND COMPLEX/MSM) Tab Take 2 Tabs by mouth every day.     • Omega-3 Fatty Acids (FISH OIL) 1000 MG Cap capsule Take 1,000 mg by mouth every day.     • Misc. Devices Misc Please provide patient with shingles vaccine as well as TDAP 1 Application 0   • clindamycin (CLEOCIN) 150 MG Cap Take 1 Cap by mouth 3 times a day. (Patient not taking: Reported on 9/6/2016) 40 Cap 0     No facility-administered medications prior to visit.                HPI    Review of Systems   Constitutional: Negative.    HENT:  "Negative.    Eyes: Negative.    Respiratory: Negative.    Cardiovascular: Negative.    Gastrointestinal: Negative.    Genitourinary: Negative.    Musculoskeletal: Negative.    Skin: Negative.    Neurological: Negative.    Endo/Heme/Allergies: Negative.    Psychiatric/Behavioral: Negative.           Objective:     /86   Pulse 65   Temp 36.3 °C (97.3 °F)   Ht 1.768 m (5' 9.6\")   Wt 88.3 kg (194 lb 9.6 oz)   SpO2 97%   BMI 28.24 kg/m²      Physical Exam   Constitutional: He is oriented to person, place, and time. He appears well-developed and well-nourished. No distress.   HENT:   Head: Normocephalic and atraumatic.   Right Ear: External ear normal.   Left Ear: External ear normal.   Nose: Nose normal.   Mouth/Throat: Oropharynx is clear and moist. No oropharyngeal exudate.   Eyes: Conjunctivae and EOM are normal. Pupils are equal, round, and reactive to light. Right eye exhibits no discharge. Left eye exhibits no discharge. No scleral icterus.   Neck: Normal range of motion. Neck supple. No JVD present. No tracheal deviation present. No thyromegaly present.   Cardiovascular: Normal rate, regular rhythm, normal heart sounds and intact distal pulses.  Exam reveals no gallop and no friction rub.    No murmur heard.  Pulmonary/Chest: Effort normal and breath sounds normal. No stridor. No respiratory distress. He has no wheezes. He has no rales. He exhibits no tenderness.   Abdominal: Soft. Bowel sounds are normal. He exhibits no distension and no mass. There is no tenderness. There is no rebound and no guarding.   Musculoskeletal: Normal range of motion. He exhibits no edema or tenderness.   Lymphadenopathy:     He has no cervical adenopathy.   Neurological: He is alert and oriented to person, place, and time. He has normal reflexes. He displays normal reflexes. He exhibits normal muscle tone. Coordination normal.   Skin: Skin is warm and dry. No rash noted. He is not diaphoretic. No erythema. No pallor. "   Psychiatric: He has a normal mood and affect. His behavior is normal. Judgment and thought content normal.     No visits with results within 1 Month(s) from this visit.   Latest known visit with results is:   Hospital Outpatient Visit on 08/02/2017   Component Date Value   • Sodium 08/02/2017 137    • Potassium 08/02/2017 4.4    • Chloride 08/02/2017 106    • Co2 08/02/2017 23    • Anion Gap 08/02/2017 8.0    • Glucose 08/02/2017 93    • Bun 08/02/2017 21    • Creatinine 08/02/2017 0.65    • Calcium 08/02/2017 9.2    • AST(SGOT) 08/02/2017 22    • ALT(SGPT) 08/02/2017 18    • Alkaline Phosphatase 08/02/2017 61    • Total Bilirubin 08/02/2017 1.0    • Albumin 08/02/2017 3.8    • Total Protein 08/02/2017 6.9    • Globulin 08/02/2017 3.1    • A-G Ratio 08/02/2017 1.2    • WBC 08/02/2017 6.3    • RBC 08/02/2017 4.70    • Hemoglobin 08/02/2017 15.9    • Hematocrit 08/02/2017 45.8    • MCV 08/02/2017 97.4    • MCH 08/02/2017 33.8*   • MCHC 08/02/2017 34.7    • RDW 08/02/2017 55.0*   • Platelet Count 08/02/2017 289    • MPV 08/02/2017 10.3    • Neutrophils-Polys 08/02/2017 33.30*   • Lymphocytes 08/02/2017 57.30*   • Monocytes 08/02/2017 5.80    • Eosinophils 08/02/2017 2.80    • Basophils 08/02/2017 0.50    • Immature Granulocytes 08/02/2017 0.30    • Nucleated RBC 08/02/2017 0.30    • Neutrophils (Absolute) 08/02/2017 2.11    • Lymphs (Absolute) 08/02/2017 3.63    • Monos (Absolute) 08/02/2017 0.37    • Eos (Absolute) 08/02/2017 0.18    • Baso (Absolute) 08/02/2017 0.03    • Immature Granulocytes (a* 08/02/2017 0.02    • NRBC (Absolute) 08/02/2017 0.02    • Cholesterol,Tot 08/02/2017 209*   • Triglycerides 08/02/2017 77    • HDL 08/02/2017 51    • LDL 08/02/2017 143*   • GFR If  08/02/2017 >60    • GFR If Non  Ameri* 08/02/2017 >60       No results found for: HBA1C  Lab Results   Component Value Date/Time    SODIUM 137 08/02/2017 08:21 AM    POTASSIUM 4.4 08/02/2017 08:21 AM    CHLORIDE 106  08/02/2017 08:21 AM    CO2 23 08/02/2017 08:21 AM    GLUCOSE 93 08/02/2017 08:21 AM    BUN 21 08/02/2017 08:21 AM    CREATININE 0.65 08/02/2017 08:21 AM    CREATININE 0.9 04/10/2007 12:35 PM    ALKPHOSPHAT 61 08/02/2017 08:21 AM    ASTSGOT 22 08/02/2017 08:21 AM    ALTSGPT 18 08/02/2017 08:21 AM    TBILIRUBIN 1.0 08/02/2017 08:21 AM     Lab Results   Component Value Date/Time    INR 1.06 04/10/2007 12:35 PM     Lab Results   Component Value Date/Time    CHOLSTRLTOT 209 (H) 08/02/2017 08:21 AM     (H) 08/02/2017 08:21 AM    HDL 51 08/02/2017 08:21 AM    TRIGLYCERIDE 77 08/02/2017 08:21 AM       No results found for: TESTOSTERONE  No results found for: TSH  No results found for: FREET4  No results found for: URICACID  No components found for: VITB12  No results found for: 25HYDROXY               Assessment/Plan:     1. Chronic neck pain-×2 weeks. This just appears to be musculoskeletal nor urgent. He'll apply heat alternating with ice and take antibiotics needed. Get a new pillow. 2 stretching and strengthening exercises. If symptoms persist he'll proceed with CT scanning of the soft tissues of the neck and see ENT for further evaluation.    His oropharyngeal exam and neck exam are entirely normal. I detect no subcutaneous emphysema or saphenous masses thyromegaly lymphadenopathy or oral lesions. His lungs are clear as far as normal. The risks of the antiques and entirely normal.      REFERRAL TO ENT  - CT-SOFT TISSUE NECK W/O; Future      2. Screening for colon cancer   We will obtain his colonoscopy report which has been requested previously. Not in media.  -3. S/P colonoscopy- 2016 [facility]       4. Essential hypertension, benign     Under good control. Continue same regimen.  5. Idiopathic chronic gout of multiple sites without tophus        Under good control. Continue same regimen.          40 minute face-to-face encounter took place today.  More than half of this time was spent in the coordination of care of  the above problems, as well as counseling.

## 2017-09-20 NOTE — PROGRESS NOTES
Subjective:      Alfonso Gaspar is a 72 y.o. male who presents with Neck Pain (possible infection - right side x 2 weeks - has had in the past)  This is a new patient to me that it see his PCP today. He complains of right sided neck pain with swelling for last 2 weeks without any redness or rash. He says it feels warm to the touch. No trauma. No sore throat. No fever chills or cough. The rest of the ROS negative. Patient denies any sensation of crepitance in his neck and denies any worsening with cough.    Patient had similar symptoms several years ago which led to severe extensive infection involving the entire left side of his neck and upper chest.    And     Outpatient Medications Prior to Visit   Medication Sig Dispense Refill   • allopurinol (ZYLOPRIM) 300 MG Tab Take 1 Tab by mouth every day. TAKE 1 TAB BY MOUTH EVERY DAY. 90 Tab 3   • enalapril (VASOTEC) 10 MG Tab Take 1 Tab by mouth every day. 90 Tab 0   • Calcium Carbonate-Vitamin D (CALCIUM-D) 600-400 MG-UNIT Tab Take 2 Tabs by mouth every day.     • aspirin EC (ECOTRIN) 81 MG Tablet Delayed Response Take 81 mg by mouth every day.     • Multiple Vitamins-Minerals (MENS 50+ MULTI VITAMIN/MIN) Tab Take 1 Tab by mouth every day.     • Misc Natural Products (GLUCOSAMINE CHOND COMPLEX/MSM) Tab Take 2 Tabs by mouth every day.     • Omega-3 Fatty Acids (FISH OIL) 1000 MG Cap capsule Take 1,000 mg by mouth every day.     • Misc. Devices Misc Please provide patient with shingles vaccine as well as TDAP 1 Application 0   • clindamycin (CLEOCIN) 150 MG Cap Take 1 Cap by mouth 3 times a day. (Patient not taking: Reported on 9/6/2016) 40 Cap 0     No facility-administered medications prior to visit.      Allergies   Allergen Reactions   • Pcn [Penicillins] Hives                   HPI    Review of Systems   Constitutional: Negative.    HENT: Negative.    Eyes: Negative.    Respiratory: Negative.    Cardiovascular: Negative.    Gastrointestinal: Negative.    Genitourinary:  "Negative.    Musculoskeletal: Negative.    Skin: Negative.    Neurological: Negative.    Endo/Heme/Allergies: Negative.    Psychiatric/Behavioral: Negative.           Objective:     /86   Pulse 65   Temp 36.3 °C (97.3 °F)   Ht 1.768 m (5' 9.6\")   Wt 88.3 kg (194 lb 9.6 oz)   SpO2 97%   BMI 28.24 kg/m²      Physical Exam  Examination of the HEENT is entirely normal. There are no masses or adenopathy or thyromegaly of the neck. There is no warmth or redness or rash. Pharyngeal exam is entirely normal. There is no subcutaneous crepitance in the neck or cervical areas. Digital exam his pharynx is unremarkable. Heart and lungs are normal. Abdomen is benign.    No visits with results within 1 Month(s) from this visit.   Latest known visit with results is:   Hospital Outpatient Visit on 08/02/2017   Component Date Value   • Sodium 08/02/2017 137    • Potassium 08/02/2017 4.4    • Chloride 08/02/2017 106    • Co2 08/02/2017 23    • Anion Gap 08/02/2017 8.0    • Glucose 08/02/2017 93    • Bun 08/02/2017 21    • Creatinine 08/02/2017 0.65    • Calcium 08/02/2017 9.2    • AST(SGOT) 08/02/2017 22    • ALT(SGPT) 08/02/2017 18    • Alkaline Phosphatase 08/02/2017 61    • Total Bilirubin 08/02/2017 1.0    • Albumin 08/02/2017 3.8    • Total Protein 08/02/2017 6.9    • Globulin 08/02/2017 3.1    • A-G Ratio 08/02/2017 1.2    • WBC 08/02/2017 6.3    • RBC 08/02/2017 4.70    • Hemoglobin 08/02/2017 15.9    • Hematocrit 08/02/2017 45.8    • MCV 08/02/2017 97.4    • MCH 08/02/2017 33.8*   • MCHC 08/02/2017 34.7    • RDW 08/02/2017 55.0*   • Platelet Count 08/02/2017 289    • MPV 08/02/2017 10.3    • Neutrophils-Polys 08/02/2017 33.30*   • Lymphocytes 08/02/2017 57.30*   • Monocytes 08/02/2017 5.80    • Eosinophils 08/02/2017 2.80    • Basophils 08/02/2017 0.50    • Immature Granulocytes 08/02/2017 0.30    • Nucleated RBC 08/02/2017 0.30    • Neutrophils (Absolute) 08/02/2017 2.11    • Lymphs (Absolute) 08/02/2017 3.63    • " Monos (Absolute) 08/02/2017 0.37    • Eos (Absolute) 08/02/2017 0.18    • Baso (Absolute) 08/02/2017 0.03    • Immature Granulocytes (a* 08/02/2017 0.02    • NRBC (Absolute) 08/02/2017 0.02    • Cholesterol,Tot 08/02/2017 209*   • Triglycerides 08/02/2017 77    • HDL 08/02/2017 51    • LDL 08/02/2017 143*   • GFR If  08/02/2017 >60    • GFR If Non  Ameri* 08/02/2017 >60       No results found for: HBA1C  Lab Results   Component Value Date/Time    SODIUM 137 08/02/2017 08:21 AM    POTASSIUM 4.4 08/02/2017 08:21 AM    CHLORIDE 106 08/02/2017 08:21 AM    CO2 23 08/02/2017 08:21 AM    GLUCOSE 93 08/02/2017 08:21 AM    BUN 21 08/02/2017 08:21 AM    CREATININE 0.65 08/02/2017 08:21 AM    CREATININE 0.9 04/10/2007 12:35 PM    ALKPHOSPHAT 61 08/02/2017 08:21 AM    ASTSGOT 22 08/02/2017 08:21 AM    ALTSGPT 18 08/02/2017 08:21 AM    TBILIRUBIN 1.0 08/02/2017 08:21 AM     Lab Results   Component Value Date/Time    INR 1.06 04/10/2007 12:35 PM     Lab Results   Component Value Date/Time    CHOLSTRLTOT 209 (H) 08/02/2017 08:21 AM     (H) 08/02/2017 08:21 AM    HDL 51 08/02/2017 08:21 AM    TRIGLYCERIDE 77 08/02/2017 08:21 AM       No results found for: TESTOSTERONE  No results found for: TSH  No results found for: FREET4  No results found for: URICACID  No components found for: VITB12  No results found for: 25HYDROXY            Assessment/Plan:     1. Chronic neck pain    Because of this time it history of right-sided neck pain remains unclear. They just be simply no musculoskeletal and should resolve with time rest and heat and proper pillow. However since this is new to him, we should not miss occult ENT cancer secondary to HPV. We'll get a CT scan of his neck and refer to ENT for second opinion. His symptoms resolved before these measures can be obtained they can be canceled.   REFERRAL TO ENT  - CT-SOFT TISSUE NECK W/O; Future  2. Screening for colon cancer  Need to obtain colonoscopy report and  scanned into health maintenance.-    3. S/P colonoscopy- [facility]           30 minute face-to-face encounter took place today.  More than half of this time was spent in the coordination of care of the above problems, as well as counseling.

## 2017-09-21 DIAGNOSIS — M54.2 NECK PAIN: ICD-10-CM

## 2017-09-21 DIAGNOSIS — R22.2 SUPRACLAVICULAR FOSSA FULLNESS: ICD-10-CM

## 2017-09-26 DIAGNOSIS — M1A.09X0 CHRONIC GOUT OF MULTIPLE SITES, UNSPECIFIED CAUSE: ICD-10-CM

## 2017-09-26 DIAGNOSIS — I10 ESSENTIAL HYPERTENSION: ICD-10-CM

## 2017-09-26 RX ORDER — ALLOPURINOL 300 MG/1
TABLET ORAL
Qty: 90 TAB | Refills: 0 | Status: SHIPPED | OUTPATIENT
Start: 2017-09-26 | End: 2017-12-22 | Stop reason: SDUPTHER

## 2017-09-26 RX ORDER — ENALAPRIL MALEATE 10 MG/1
10 TABLET ORAL
Qty: 90 TAB | Refills: 0 | Status: SHIPPED | OUTPATIENT
Start: 2017-09-26 | End: 2017-12-22 | Stop reason: SDUPTHER

## 2017-09-29 ENCOUNTER — TELEPHONE (OUTPATIENT)
Dept: MEDICAL GROUP | Age: 72
End: 2017-09-29

## 2017-09-29 NOTE — TELEPHONE ENCOUNTER
----- Message from Lila Woodard sent at 9/29/2017  9:56 AM PDT -----  Imaging needs clarification on a CT soft tissue neck without iv conrast the diagnoses code says screening for colon cancer this does not match   They need to know if it is for colon cancer does he want them to switch the order to a CT soft tissue neck with iv contrast  Or if it is for neck pain does he want a CT for the cervical spin?    Call ext [phone] ask for saray

## 2017-10-03 ENCOUNTER — HOSPITAL ENCOUNTER (OUTPATIENT)
Dept: RADIOLOGY | Facility: MEDICAL CENTER | Age: 72
End: 2017-10-03
Attending: INTERNAL MEDICINE
Payer: MEDICARE

## 2017-10-03 DIAGNOSIS — M54.2 NECK PAIN: ICD-10-CM

## 2017-10-03 DIAGNOSIS — S12.9XXA COMPRESSION FRACTURE OF CERVICAL SPINE, INITIAL ENCOUNTER: ICD-10-CM

## 2017-10-03 PROCEDURE — 72125 CT NECK SPINE W/O DYE: CPT

## 2017-10-06 ENCOUNTER — TELEPHONE (OUTPATIENT)
Dept: MEDICAL GROUP | Age: 72
End: 2017-10-06

## 2017-10-06 NOTE — LETTER
October 16, 2017        Alfonso Gaspar  [address]  [facility] [address]        Dear Alfonso:    Dr. Greco's office has been unable to reach you. Please call our office at [phone]. Thank you.      If you have any questions or concerns, please don't hesitate to call.        Sincerely,      Shannan Leonard, Med Ass't      Electronically Signed

## 2017-10-06 NOTE — TELEPHONE ENCOUNTER
Phone Number Called: [phone] (home)     Message: called pt left message for pt to call back.     Left Message for patient to call back: yes

## 2017-10-06 NOTE — TELEPHONE ENCOUNTER
----- Message from Jerry Jarrett M.D. sent at 10/3/2017  5:24 PM PDT -----  Patient has compression fracture of C6 and should be referred to physical medicine for further management of this. No mass in the supraclavicular fossa on the right, for which the CT scan was ordered.

## 2017-10-16 NOTE — TELEPHONE ENCOUNTER
Phone Number Called: [phone] (home)     Message: called pt left message for pt to call back. Letter mailed to pt.     Left Message for patient to call back: yes

## 2017-11-16 ENCOUNTER — TELEPHONE (OUTPATIENT)
Dept: MEDICAL GROUP | Age: 72
End: 2017-11-16

## 2017-11-16 NOTE — TELEPHONE ENCOUNTER
ESTABLISHED PATIENT PRE-VISIT PLANNING     Note: Patient will not be contacted if there is no indication to call.     1.  Reviewed notes from the last few office visits within the medical group: Yes    2.  If any orders were placed at last visit or intended to be done for this visit (i.e. 6 mos follow-up), do we have Results/Consult Notes?        •  Labs - Labs were not ordered at last office visit.   Note: If patient appointment is for lab review and patient did not complete labs, check with provider if OK to reschedule patient until labs completed.       •  Imaging - Imaging ordered, completed and results are in chart.       •  Referrals - Referral ordered, patient has NOT been seen.    3. Is this appointment scheduled as a Hospital Follow-Up? No    4.  Immunizations were updated in Epic using WebIZ?: Epic matches WebIZ       •  Web Iz Recommendations: FLU, TDAP and ZOSTAVAX (Shingles)    5.  Patient is due for the following Health Maintenance Topics:   Health Maintenance Due   Topic Date Due   • IMM ZOSTER VACCINE  04/08/2004   • IMM DTaP/Tdap/Td Vaccine (1 - Tdap) 09/18/2011   • IMM INFLUENZA (1) 09/01/2017       - Patient is up-to-date on all Health Maintenance topics. No records have been requested at this time.    6.  Patient was NOT informed to arrive 15 min prior to their scheduled appointment and bring in their medication bottles.

## 2017-11-17 ENCOUNTER — OFFICE VISIT (OUTPATIENT)
Dept: MEDICAL GROUP | Age: 72
End: 2017-11-17
Payer: MEDICARE

## 2017-11-17 VITALS
RESPIRATION RATE: 16 BRPM | OXYGEN SATURATION: 98 % | SYSTOLIC BLOOD PRESSURE: 130 MMHG | HEIGHT: 69 IN | BODY MASS INDEX: 28.73 KG/M2 | HEART RATE: 97 BPM | TEMPERATURE: 98.2 F | WEIGHT: 194 LBS | DIASTOLIC BLOOD PRESSURE: 78 MMHG

## 2017-11-17 DIAGNOSIS — M54.2 NECK PAIN: ICD-10-CM

## 2017-11-17 PROCEDURE — 99214 OFFICE O/P EST MOD 30 MIN: CPT | Performed by: FAMILY MEDICINE

## 2017-11-17 RX ORDER — DOCUSATE SODIUM 100 MG/1
100 CAPSULE, LIQUID FILLED ORAL 2 TIMES DAILY
Qty: 180 CAP | Refills: 0 | Status: SHIPPED | OUTPATIENT
Start: 2017-11-17 | End: 2018-03-15 | Stop reason: SDUPTHER

## 2017-11-17 RX ORDER — HYDROCODONE BITARTRATE AND ACETAMINOPHEN 7.5; 325 MG/1; MG/1
1 TABLET ORAL 2 TIMES DAILY PRN
Qty: 60 TAB | Refills: 0 | Status: SHIPPED | OUTPATIENT
Start: 2017-11-17 | End: 2018-03-27 | Stop reason: SDUPTHER

## 2017-11-17 ASSESSMENT — PAIN SCALES - GENERAL: PAINLEVEL: 6=MODERATE PAIN

## 2017-11-18 NOTE — PROGRESS NOTES
This medical record contains text that has been entered with the assistance of computer voice recognition and dictation software.  Therefore, it may contain unintended errors in text, spelling, punctuation, or grammar    Chief Complaint   Patient presents with   • Follow-Up       Alfonso Gaspar is a 72 y.o. male here evaluation and management of: neck pain/concern for neck infection      HPI:     Neck pain  Patient states he has a history of chronic neck pain, osteoporosis and history of cervical vertebral fracture. He states that he's been having neck pain for many years now, about 30 years ago this neck pain was associated with an abscess in his neck he states. This lead to an ICU admission for 5 days. So he is very worried about the symptoms leading to the same severe infection. He states the neck pain mainly prevents him from sleeping, denies any weakness in any extremity he does not take any narcotics nor has he ever. He denies any loss of muscle strength no numbness or attending anywhere.    Current medicines (including changes today)  Current Outpatient Prescriptions   Medication Sig Dispense Refill   • hydrocodone-acetaminophen (NORCO) 7.5-325 MG per tablet Take 1 Tab by mouth 2 times a day as needed. 60 Tab 0   • Psyllium (METAMUCIL FIBER) 51.7 % Pack Take 1 Application by mouth 2 Times a Day. 90 Each 0   • docusate sodium (COLACE) 100 MG Cap Take 1 Cap by mouth 2 times a day. 180 Cap 0   • enalapril (VASOTEC) 10 MG Tab Take 1 Tab by mouth every day. 90 Tab 0   • Calcium Carbonate-Vitamin D (CALCIUM-D) 600-400 MG-UNIT Tab Take 2 Tabs by mouth every day.     • aspirin EC (ECOTRIN) 81 MG Tablet Delayed Response Take 81 mg by mouth every day.     • Multiple Vitamins-Minerals (MENS 50+ MULTI VITAMIN/MIN) Tab Take 1 Tab by mouth every day.     • Misc Natural Products (GLUCOSAMINE CHOND COMPLEX/MSM) Tab Take 2 Tabs by mouth every day.     • Omega-3 Fatty Acids (FISH OIL) 1000 MG Cap capsule Take 1,000 mg by  "mouth every day.     • allopurinol (ZYLOPRIM) 300 MG Tab TAKE 1 TAB BY MOUTH EVERY DAY. 90 Tab 0   • enalapril (VASOTEC) 10 MG Tab TAKE 1 TAB BY MOUTH EVERY DAY. 90 Tab 0   • Misc. Devices Misc Please provide patient with shingles vaccine as well as TDAP 1 Application 0   • clindamycin (CLEOCIN) 150 MG Cap Take 1 Cap by mouth 3 times a day. (Patient not taking: Reported on 2016) 40 Cap 0     No current facility-administered medications for this visit.      He  has a past medical history of Gout; Gout; and Hypertension.  He  has a past surgical history that includes pr removal spleen, total and pr removal of tonsils,<10 y/o.  Social History   Substance Use Topics   • Smoking status: Former Smoker     Packs/day: 0.25     Years: 10.00     Types: Cigarettes     Quit date: 1975   • Smokeless tobacco: Never Used   • Alcohol use 4.2 oz/week     5 Cans of beer, 2 Glasses of wine per week      Comment: occ     Social History     Social History Narrative   • No narrative on file     Family History   Problem Relation Age of Onset   • Diabetes Father    • No Known Problems Maternal Grandmother    • Cancer Maternal Grandfather      Lung cancer   • No Known Problems Paternal Grandmother    • No Known Problems Paternal Grandfather    • Other Sister      polio     Family Status   Relation Status   • Father  at age 74    heart attack   • Mother    • Sister Alive   • Maternal Grandmother    • Maternal Grandfather    • Paternal Grandmother    • Paternal Grandfather    • Sister Alive         ROS    Please see hpi     All other systems reviewed and are negative     Objective:     Blood pressure 130/78, pulse 97, temperature 36.8 °C (98.2 °F), resp. rate 16, height 1.753 m (5' 9\"), weight 88 kg (194 lb), SpO2 98 %. Body mass index is 28.65 kg/m².  Physical Exam:    Constitutional: Alert, no distress.  Skin: Warm, dry, good turgor, no rashes in visible areas.  Eye: Equal, round and " reactive, conjunctiva clear, lids normal.  ENMT: Lips without lesions, good dentition, oropharynx clear.  Neck: Trachea midline, no masses, no thyromegaly. No cervical or supraclavicular lymphadenopathy.  Respiratory: Unlabored respiratory effort, lungs clear to auscultation, no wheezes, no ronchi.  Cardiovascular: Normal S1, S2, no murmur, no edema.  Abdomen: Soft, non-tender, no masses, no hepatosplenomegaly.  Psych: Alert and oriented x3, normal affect and mood.      Neuro:  CN II-XII checked and intact, DTRs 2+ throughout, intact sensation to light touch, vibration, normal gait, No focal deficits, Romberg Normal, Normal tandem gait, normal heel to shin, normal finger to nose.                        Assessment and Plan:   The following treatment plan was discussed      1. Neck pain  Patient is afebrile and nontoxic in appearance  Clinical examination is not suggestive of any infection  We will try Norco as over-the-counter medications are not helping  He already has an appointment with spine surgery  He was given ER precautions.    - hydrocodone-acetaminophen (NORCO) 7.5-325 MG per tablet; Take 1 Tab by mouth 2 times a day as needed.  Dispense: 60 Tab; Refill: 0  - Psyllium (METAMUCIL FIBER) 51.7 % Pack; Take 1 Application by mouth 2 Times a Day.  Dispense: 90 Each; Refill: 0  - docusate sodium (COLACE) 100 MG Cap; Take 1 Cap by mouth 2 times a day.  Dispense: 180 Cap; Refill: 0        HEALTH MAINTENANCE:    Instructed to Follow up in clinic or ER for worsening symptoms, difficulty breathing, lack of expected recovery, or should new symptoms or problems arise.    Followup: Return in about 4 weeks (around 12/15/2017) for Reevaluation.       Once again this medical record contains text that has been entered with the assistance of computer voice recognition and dictation software.  Therefore, it may contain unintended errors in text, spelling, punctuation, or grammar

## 2017-11-18 NOTE — ASSESSMENT & PLAN NOTE
Patient states he has a history of chronic neck pain, osteoporosis and history of cervical vertebral fracture. He states that he's been having neck pain for many years now, about 30 years ago this neck pain was associated with an abscess in his neck he states. This lead to an ICU admission for 5 days. So he is very worried about the symptoms leading to the same severe infection. He states the neck pain mainly prevents him from sleeping, denies any weakness in any extremity he does not take any narcotics nor has he ever. He denies any loss of muscle strength no numbness or attending anywhere.

## 2017-12-22 DIAGNOSIS — I10 ESSENTIAL HYPERTENSION: ICD-10-CM

## 2017-12-22 DIAGNOSIS — M1A.09X0 CHRONIC GOUT OF MULTIPLE SITES, UNSPECIFIED CAUSE: ICD-10-CM

## 2017-12-26 RX ORDER — ENALAPRIL MALEATE 10 MG/1
TABLET ORAL
Qty: 90 TAB | Refills: 0 | Status: SHIPPED | OUTPATIENT
Start: 2017-12-26 | End: 2018-04-19 | Stop reason: SDUPTHER

## 2017-12-26 RX ORDER — ALLOPURINOL 300 MG/1
TABLET ORAL
Qty: 90 TAB | Refills: 0 | Status: SHIPPED | OUTPATIENT
Start: 2017-12-26 | End: 2018-04-19 | Stop reason: SDUPTHER

## 2018-03-15 DIAGNOSIS — M54.2 NECK PAIN: ICD-10-CM

## 2018-03-15 RX ORDER — HYDROCODONE BITARTRATE AND ACETAMINOPHEN 7.5; 325 MG/1; MG/1
1 TABLET ORAL 2 TIMES DAILY PRN
Qty: 60 TAB | Refills: 0 | Status: CANCELLED | OUTPATIENT
Start: 2018-03-15

## 2018-03-15 NOTE — TELEPHONE ENCOUNTER
Phone Number Called: [phone] (home)     Message: I have tried to call the patient twice, but there was no answer will try again at another time. I wanted to let him know that he will need to come in for an appointment to get any pain med refills.    Left Message for patient to call back: no

## 2018-03-16 RX ORDER — DOCUSATE SODIUM 100 MG/1
100 CAPSULE, LIQUID FILLED ORAL 2 TIMES DAILY
Qty: 180 CAP | Refills: 0 | Status: SHIPPED | OUTPATIENT
Start: 2018-03-16 | End: 2019-05-25

## 2018-03-27 ENCOUNTER — OFFICE VISIT (OUTPATIENT)
Dept: MEDICAL GROUP | Age: 73
End: 2018-03-27
Payer: COMMERCIAL

## 2018-03-27 VITALS
HEIGHT: 69 IN | WEIGHT: 192 LBS | BODY MASS INDEX: 28.44 KG/M2 | SYSTOLIC BLOOD PRESSURE: 118 MMHG | OXYGEN SATURATION: 94 % | HEART RATE: 75 BPM | TEMPERATURE: 96.8 F | DIASTOLIC BLOOD PRESSURE: 74 MMHG

## 2018-03-27 DIAGNOSIS — I10 ESSENTIAL HYPERTENSION, BENIGN: ICD-10-CM

## 2018-03-27 DIAGNOSIS — M54.2 NECK PAIN: ICD-10-CM

## 2018-03-27 DIAGNOSIS — L82.0 INFLAMED SEBORRHEIC KERATOSIS: ICD-10-CM

## 2018-03-27 DIAGNOSIS — M1A.09X0 IDIOPATHIC CHRONIC GOUT OF MULTIPLE SITES WITHOUT TOPHUS: ICD-10-CM

## 2018-03-27 DIAGNOSIS — L57.0 AK (ACTINIC KERATOSIS): ICD-10-CM

## 2018-03-27 DIAGNOSIS — N40.0 BENIGN PROSTATIC HYPERPLASIA WITHOUT LOWER URINARY TRACT SYMPTOMS: ICD-10-CM

## 2018-03-27 DIAGNOSIS — Z79.891 CHRONIC USE OF OPIATE DRUGS THERAPEUTIC PURPOSES: ICD-10-CM

## 2018-03-27 PROCEDURE — 17000 DESTRUCT PREMALG LESION: CPT | Mod: 59 | Performed by: FAMILY MEDICINE

## 2018-03-27 PROCEDURE — 17003 DESTRUCT PREMALG LES 2-14: CPT | Performed by: FAMILY MEDICINE

## 2018-03-27 PROCEDURE — 99214 OFFICE O/P EST MOD 30 MIN: CPT | Mod: 25 | Performed by: FAMILY MEDICINE

## 2018-03-27 PROCEDURE — 17110 DESTRUCTION B9 LES UP TO 14: CPT | Performed by: FAMILY MEDICINE

## 2018-03-27 RX ORDER — HYDROCODONE BITARTRATE AND ACETAMINOPHEN 7.5; 325 MG/1; MG/1
1 TABLET ORAL 2 TIMES DAILY PRN
Qty: 60 TAB | Refills: 0 | Status: SHIPPED | OUTPATIENT
Start: 2018-03-27 | End: 2018-03-27 | Stop reason: SDUPTHER

## 2018-03-27 RX ORDER — HYDROCODONE BITARTRATE AND ACETAMINOPHEN 7.5; 325 MG/1; MG/1
1 TABLET ORAL 2 TIMES DAILY PRN
Qty: 60 TAB | Refills: 0 | Status: SHIPPED | OUTPATIENT
Start: 2018-03-27 | End: 2018-04-26

## 2018-03-27 ASSESSMENT — PATIENT HEALTH QUESTIONNAIRE - PHQ9: CLINICAL INTERPRETATION OF PHQ2 SCORE: 0

## 2018-03-27 NOTE — ASSESSMENT & PLAN NOTE
Enalapril 10 mg by mouth daily    The patient has been tollerating the BP meds without any issues. No tunnel vision, no cough, no changes in vision, no lightheadedness, no fatigue, no syncopal or presyncopal episodes, no edema, no new rashes.

## 2018-03-27 NOTE — ASSESSMENT & PLAN NOTE
The patient states he had good results with the cryotherapy session in the past for the irritating plaques on his back and sides as well as arms. He has several that have not fallen off and continue to cause him irritation, he scratches and the bleeding scab up.

## 2018-03-27 NOTE — ASSESSMENT & PLAN NOTE
Patient is a 72-year-old male who has a history of chronic use of opiates due to chronic neck pain. He has a significant past medical history of a cervical vertebral fracture the pain is so severe he is being admitted as a result the past. He's been taking 2 Norco's 7 mg by mouth twice a day. There has been no escalation of use. There has been no unintentional overdoses, he denies constipation.

## 2018-03-27 NOTE — PROGRESS NOTES
This medical record contains text that has been entered with the assistance of computer voice recognition and dictation software.  Therefore, it may contain unintended errors in text, spelling, punctuation, or grammar    Chief Complaint   Patient presents with   • Medication Refill       Alfonso Gaspar is a 72 y.o. male here evaluation and management of: Pain med refill, hypertension, gout      HPI:     Chronic use of opiate drugs therapeutic purposes  Patient is a 72-year-old male who has a history of chronic use of opiates due to chronic neck pain. He has a significant past medical history of a cervical vertebral fracture the pain is so severe he is being admitted as a result the past. He's been taking 2 Norco's 7 mg by mouth twice a day. There has been no escalation of use. There has been no unintentional overdoses, he denies constipation.    Inflamed seborrheic keratosis  The patient states he had good results with the cryotherapy session in the past for the irritating plaques on his back and sides as well as arms. He has several that have not fallen off and continue to cause him irritation, he scratches and the bleeding scab up.    Essential hypertension, benign  Enalapril 10 mg by mouth daily    The patient has been tollerating the BP meds without any issues. No tunnel vision, no cough, no changes in vision, no lightheadedness, no fatigue, no syncopal or presyncopal episodes, no edema, no new rashes.     Idiopathic chronic gout of multiple sites without tophus  Patient continues to be gout attack free since 2000, he's been taking allopurinol for many years. His sites of involvement are ankles elbows as well as bilateral knees.    Current medicines (including changes today)  Current Outpatient Prescriptions   Medication Sig Dispense Refill   • HYDROcodone-acetaminophen (NORCO) 7.5-325 MG per tablet Take 1 Tab by mouth 2 times a day as needed for up to 30 days. 60 Tab 0   • docusate sodium (COLACE) 100 MG Cap  Take 1 Cap by mouth 2 times a day. 180 Cap 0   • allopurinol (ZYLOPRIM) 300 MG Tab TAKE 1 TABLET BY MOUTH EVERY DAY. 90 Tab 0   • enalapril (VASOTEC) 10 MG Tab Take 1 Tab by mouth every day. 90 Tab 0   • Calcium Carbonate-Vitamin D (CALCIUM-D) 600-400 MG-UNIT Tab Take 2 Tabs by mouth every day.     • aspirin EC (ECOTRIN) 81 MG Tablet Delayed Response Take 81 mg by mouth every day.     • Multiple Vitamins-Minerals (MENS 50+ MULTI VITAMIN/MIN) Tab Take 1 Tab by mouth every day.     • Misc Natural Products (GLUCOSAMINE CHOND COMPLEX/MSM) Tab Take 2 Tabs by mouth every day.     • Omega-3 Fatty Acids (FISH OIL) 1000 MG Cap capsule Take 1,000 mg by mouth every day.     • enalapril (VASOTEC) 10 MG Tab TAKE 1 TABLET BY MOUTH EVERY DAY. 90 Tab 0   • Psyllium (METAMUCIL FIBER) 51.7 % Pack Take 1 Application by mouth 2 Times a Day. 90 Each 0   • Misc. Devices Misc Please provide patient with shingles vaccine as well as TDAP 1 Application 0   • clindamycin (CLEOCIN) 150 MG Cap Take 1 Cap by mouth 3 times a day. (Patient not taking: Reported on 9/6/2016) 40 Cap 0     No current facility-administered medications for this visit.      He  has a past medical history of Gout; Gout; and Hypertension.  He  has a past surgical history that includes pr removal spleen, total and pr removal of tonsils,<12 y/o.  Social History   Substance Use Topics   • Smoking status: Former Smoker     Packs/day: 0.25     Years: 10.00     Types: Cigarettes     Quit date: 12/1/1975   • Smokeless tobacco: Never Used   • Alcohol use 4.2 oz/week     5 Cans of beer, 2 Glasses of wine per week      Comment: occ     Social History     Social History Narrative   • No narrative on file     Family History   Problem Relation Age of Onset   • Diabetes Father    • No Known Problems Maternal Grandmother    • Cancer Maternal Grandfather      Lung cancer   • No Known Problems Paternal Grandmother    • No Known Problems Paternal Grandfather    • Other Sister      polio  "    Family Status   Relation Status   • Father  at age 74    heart attack   • Mother    • Sister Alive   • Maternal Grandmother    • Maternal Grandfather    • Paternal Grandmother    • Paternal Grandfather    • Sister Alive         ROS    Please see hpi     All other systems reviewed and are negative     Objective:     Blood pressure 118/74, pulse 75, temperature 36 °C (96.8 °F), height 1.753 m (5' 9\"), weight 87.1 kg (192 lb), SpO2 94 %. Body mass index is 28.35 kg/m².  Physical Exam:    Constitutional: Alert, no distress.  Skin: Warm, dry, good turgor, no rashes in visible areas.  Eye: Equal, round and reactive, conjunctiva clear, lids normal.  ENMT: Lips without lesions, good dentition, oropharynx clear.  Neck: Trachea midline, no masses, no thyromegaly. No cervical or supraclavicular lymphadenopathy.  Respiratory: Unlabored respiratory effort, lungs clear to auscultation, no wheezes, no ronchi.  Cardiovascular: Normal S1, S2, no murmur, no edema.  Abdomen: Soft, non-tender, no masses, no hepatosplenomegaly.  Psych: Alert and oriented x3, normal affect and mood.      SKIN EXAM  Several irregular, pigmented, verrucous surface plaques with dried hemmorhage , greater than 1 cm on back and chest,           multiple lesions on bilateral forearms, hands, and chest, with evidence of of solar damage present , spotty hyperpigmentation, scattered telangiectasias, and  Xerosis      PROCEDURE: CRYOTHERAPY  Discussed risks and benefits of cryotherapy including but not limited to scarring, hyperpigmentation, hypopigmentation, hypertrophic scarring, keiloid scarring, incomplete or no resolution of lesions treated,pain, undesirable cosemetic result, blistering, potential need for additional treatment including more invasive treatment. Patient expresses understanding and verbally acknowledges risks and consent to treatment. 2  applications of cryotherapy with 3 second freeze thaw " cycle was applied to  4AK's and  all irritated and inflamed Seborrheic Keratoses. Patient tolerated procedure well. There were no complications. Aftercare instructions given.            Assessment and Plan:   The following treatment plan was discussed      1. Neck pain    Reviewed pain contract with patient -- it was signed  The patient  may have to give random urine drug screens    will not have the pain meds refilled early, whether he lost meds or not    We reviewed all side effects including but not limited to respiratory depression and death  - CONSENT FOR OPIATE PRESCRIPTION  - HYDROcodone-acetaminophen (NORCO) 7.5-325 MG per tablet; Take 1 Tab by mouth 2 times a day as needed for up to 30 days.  Dispense: 60 Tab; Refill: 0    2. Chronic use of opiate drugs therapeutic purposes    Same as #1    - HYDROcodone-acetaminophen (NORCO) 7.5-325 MG per tablet; Take 1 Tab by mouth 2 times a day as needed for up to 30 days.  Dispense: 60 Tab; Refill: 0    3. Essential hypertension, benign    I explained to the patient that the most common cause of death in [place] in both Male and Females was Acute MI and the most common risk factor for MI was hypertension.  The Patient was counseled on aggressive life style modifications such as running at least 20minutes per day 3 times per wk, and decreasing Sodium intake,      In addition to pharmacotherapy we discussed  lifestyle modification…#1. Maintain normal weight (BMI 18.5 to 24.kg/m2), #2 DASH diet, #3 Decrease Sodium intake to less than 100meq/day (2.4g Na or 6g NaCL), #4 increase physical activity, #5 Limit Etoh consumption to 2 drinks/day in men and 1 drink per day in women.   - COMP METABOLIC PANEL; Future  - CBC WITH DIFFERENTIAL; Future  - LIPID PROFILE; Future    4. Benign prostatic hyperplasia without lower urinary tract symptoms    - PROSTATE SPECIFIC AG DIAGNOSTIC; Future    5. AK (actinic keratosis)  Patient tollerrated procedure well  There were no adverse  events  Patient was given post procedure precautions       6. Inflamed seborrheic keratosis  Patient tollerrated procedure well  There were no adverse events  Patient was given post procedure precautions           HEALTH MAINTENANCE:    Instructed to Follow up in clinic or ER for worsening symptoms, difficulty breathing, lack of expected recovery, or should new symptoms or problems arise.    Followup: Return in about 3 months (around 6/27/2018) for Medication refill.       Once again this medical record contains text that has been entered with the assistance of computer voice recognition and dictation software.  Therefore, it may contain unintended errors in text, spelling, punctuation, or grammar

## 2018-03-27 NOTE — ASSESSMENT & PLAN NOTE
Patient continues to be gout attack free since 2000, he's been taking allopurinol for many years. His sites of involvement are ankles elbows as well as bilateral knees.

## 2018-04-19 DIAGNOSIS — M1A.09X0 CHRONIC GOUT OF MULTIPLE SITES, UNSPECIFIED CAUSE: ICD-10-CM

## 2018-04-19 DIAGNOSIS — I10 ESSENTIAL HYPERTENSION: ICD-10-CM

## 2018-04-19 RX ORDER — ENALAPRIL MALEATE 10 MG/1
TABLET ORAL
Qty: 90 TAB | Refills: 0 | Status: SHIPPED | OUTPATIENT
Start: 2018-04-19 | End: 2018-07-20 | Stop reason: SDUPTHER

## 2018-04-19 RX ORDER — ALLOPURINOL 300 MG/1
TABLET ORAL
Qty: 90 TAB | Refills: 0 | Status: SHIPPED | OUTPATIENT
Start: 2018-04-19 | End: 2018-07-20 | Stop reason: SDUPTHER

## 2018-07-20 DIAGNOSIS — I10 ESSENTIAL HYPERTENSION: ICD-10-CM

## 2018-07-20 DIAGNOSIS — M1A.09X0 CHRONIC GOUT OF MULTIPLE SITES, UNSPECIFIED CAUSE: ICD-10-CM

## 2018-07-20 RX ORDER — ENALAPRIL MALEATE 10 MG/1
TABLET ORAL
Qty: 90 TAB | Refills: 0 | Status: SHIPPED | OUTPATIENT
Start: 2018-07-20 | End: 2018-10-16 | Stop reason: SDUPTHER

## 2018-07-20 RX ORDER — ALLOPURINOL 300 MG/1
TABLET ORAL
Qty: 90 TAB | Refills: 0 | Status: SHIPPED | OUTPATIENT
Start: 2018-07-20 | End: 2018-10-15 | Stop reason: SDUPTHER

## 2018-10-15 DIAGNOSIS — M1A.09X0 CHRONIC GOUT OF MULTIPLE SITES, UNSPECIFIED CAUSE: ICD-10-CM

## 2018-10-16 DIAGNOSIS — I10 ESSENTIAL HYPERTENSION: ICD-10-CM

## 2018-10-16 RX ORDER — ALLOPURINOL 300 MG/1
TABLET ORAL
Qty: 90 TAB | Refills: 0 | Status: SHIPPED | OUTPATIENT
Start: 2018-10-16 | End: 2019-01-11 | Stop reason: SDUPTHER

## 2018-10-17 RX ORDER — ENALAPRIL MALEATE 10 MG/1
TABLET ORAL
Qty: 90 TAB | Refills: 0 | Status: SHIPPED | OUTPATIENT
Start: 2018-10-17 | End: 2019-05-25

## 2018-12-21 ENCOUNTER — OFFICE VISIT (OUTPATIENT)
Dept: MEDICAL GROUP | Facility: PHYSICIAN GROUP | Age: 73
End: 2018-12-21
Payer: COMMERCIAL

## 2018-12-21 VITALS
TEMPERATURE: 97.9 F | RESPIRATION RATE: 16 BRPM | DIASTOLIC BLOOD PRESSURE: 78 MMHG | SYSTOLIC BLOOD PRESSURE: 116 MMHG | BODY MASS INDEX: 28.44 KG/M2 | OXYGEN SATURATION: 98 % | HEIGHT: 69 IN | WEIGHT: 192 LBS | HEART RATE: 97 BPM

## 2018-12-21 DIAGNOSIS — M54.50 ACUTE MIDLINE LOW BACK PAIN WITHOUT SCIATICA: Primary | ICD-10-CM

## 2018-12-21 DIAGNOSIS — Z79.891 CHRONIC USE OF OPIATE DRUGS THERAPEUTIC PURPOSES: ICD-10-CM

## 2018-12-21 PROCEDURE — 99214 OFFICE O/P EST MOD 30 MIN: CPT | Performed by: PHYSICIAN ASSISTANT

## 2018-12-22 PROBLEM — M54.50 ACUTE MIDLINE LOW BACK PAIN WITHOUT SCIATICA: Status: ACTIVE | Noted: 2018-12-22

## 2018-12-23 NOTE — PROGRESS NOTES
Chief Complaint   Patient presents with   • Back Pain       HISTORY OF PRESENT ILLNESS: Alfonso Gaspar is an established 73 y.o. male here to discuss the evaluation and management of:      Pt. is a pleasant 73-year-old male here today to discuss acute midline low back pain.  States pain symptoms developed 7 days ago.  Describes pain as a constant low-grade aching pain that can develop into a severe throbbing pain.  He denies sciatica.  He mentions that he has always had a constant low grade aching back pain and neck pain that acutely flares up. States int the early 90s he suffered a neck and back injury that occurred at different times.  He tells me that he has fractured 2 lumbar vertebrae and 1 cervical vertebrae.  States he is also experienced a herniated disc.  He mentions in the past the physical therapy did help alleviate acute back pain symptoms.  States chronic neck pain symptoms are managed by his PCP.   States he is prescribed Norco 7.5-325 mg tab and advised to take bid when he experiences acute symptoms. He mentions that he rarely takes medication and he has several tablets left.  Per [facility] patient was last prescribed medication on 5/03/18 for 60 tablets.  States since he has been experiencing severe low back pain symptoms he has been taking medication twice a day.  States he will take 1 tablet at 9 AM and the second tablet at 9 PM.  States medication does help acutely relieve back pain symptoms but he tells me a few hours before taking the second tablet back pain symptoms are back to being a deep throbbing pain.  He tells me that if he sits in a rigid position back pain symptoms improved.  States standing up or relaxing exacerbate symptoms.  He denies saddle paresthesia, incontinence, muscle atrophy or weakness.  Patient is inquiring about treatment options.      Patient Active Problem List    Diagnosis Date Noted   • Acute midline low back pain without sciatica 12/22/2018   • Chronic use of opiate  drugs therapeutic purposes 03/27/2018   • Benign prostatic hyperplasia without lower urinary tract symptoms 03/27/2018   • Neck pain 11/17/2017   • S/P colonoscopy- 2016 [facility] 09/19/2017   • AK (actinic keratosis) 07/19/2017   • Inflamed seborrheic keratosis 07/19/2017   • Preventative health care 03/29/2017   • Medicare annual wellness visit, initial 03/29/2017   • Screening for colon cancer 09/14/2016   • Idiopathic chronic gout of multiple sites without tophus 05/04/2015   • Essential hypertension, benign 03/26/2015       Allergies:Pcn [penicillins]    Current Outpatient Prescriptions   Medication Sig Dispense Refill   • enalapril (VASOTEC) 10 MG Tab TAKE 1 TABLET BY MOUTH EVERY DAY 90 Tab 0   • allopurinol (ZYLOPRIM) 300 MG Tab TAKE 1 TABLET BY MOUTH EVERY DAY 90 Tab 0   • docusate sodium (COLACE) 100 MG Cap Take 1 Cap by mouth 2 times a day. 180 Cap 0   • Psyllium (METAMUCIL FIBER) 51.7 % Pack Take 1 Application by mouth 2 Times a Day. 90 Each 0   • Misc. Devices Misc Please provide patient with shingles vaccine as well as TDAP 1 Application 0   • enalapril (VASOTEC) 10 MG Tab Take 1 Tab by mouth every day. 90 Tab 0   • Calcium Carbonate-Vitamin D (CALCIUM-D) 600-400 MG-UNIT Tab Take 2 Tabs by mouth every day.     • aspirin EC (ECOTRIN) 81 MG Tablet Delayed Response Take 81 mg by mouth every day.     • Multiple Vitamins-Minerals (MENS 50+ MULTI VITAMIN/MIN) Tab Take 1 Tab by mouth every day.     • Misc Natural Products (GLUCOSAMINE CHOND COMPLEX/MSM) Tab Take 2 Tabs by mouth every day.     • Omega-3 Fatty Acids (FISH OIL) 1000 MG Cap capsule Take 1,000 mg by mouth every day.     • clindamycin (CLEOCIN) 150 MG Cap Take 1 Cap by mouth 3 times a day. (Patient not taking: Reported on 9/6/2016) 40 Cap 0     No current facility-administered medications for this visit.        Social History   Substance Use Topics   • Smoking status: Former Smoker     Packs/day: 0.25     Years: 10.00     Types: Cigarettes     Quit date:  "1975   • Smokeless tobacco: Never Used   • Alcohol use 4.2 oz/week     2 Glasses of wine, 5 Cans of beer per week      Comment: occ       Family Status   Relation Status   • Fa  at age 74        heart attack   • Mo    • Sis Alive   • MGMo    • MGFa    • PGMo    • PGFa    • Sis Alive     Family History   Problem Relation Age of Onset   • Diabetes Father    • No Known Problems Maternal Grandmother    • Cancer Maternal Grandfather         Lung cancer   • No Known Problems Paternal Grandmother    • No Known Problems Paternal Grandfather    • Other Sister         polio       ROS:  Review of Systems   Constitutional: Negative for fever, chills, weight loss and malaise/fatigue.   HENT: Negative for ear pain, nosebleeds, congestion, sore throat and neck pain.    Eyes: Negative for blurred vision.   Respiratory: Negative for cough, sputum production, shortness of breath and wheezing.    Cardiovascular: Negative for chest pain, palpitations, orthopnea and leg swelling.   Gastrointestinal: Negative for heartburn, nausea, vomiting and abdominal pain.   Genitourinary: Negative for dysuria, urgency and frequency.   Musculoskeletal: Negative for myalgias, and joint pain.  Positive for back pain.  Skin: Negative for rash and itching.   Neurological: Negative for dizziness, tingling, tremors, sensory change, focal weakness and headaches.   Endo/Heme/Allergies: Does not bruise/bleed easily.   Psychiatric/Behavioral: Negative for depression, suicidal ideas and memory loss.  The patient is not nervous/anxious and does not have insomnia.    All other systems reviewed and are negative except as in HPI.    Exam: Blood pressure 116/78, pulse 97, temperature 36.6 °C (97.9 °F), resp. rate 16, height 1.753 m (5' 9\"), weight 87.1 kg (192 lb), SpO2 98 %. Body mass index is 28.35 kg/m².  General: Normal appearing. No distress.  HEENT: Normocephalic. Eyes conjunctiva clear lids without ptosis, " pupils equal and reactive to light accommodation, ears normal shape and contour, canals are clear bilaterally, tympanic membranes are benign, nasal mucosa benign, oropharynx is without erythema, edema or exudates.   Neck: Supple without JVD or bruit. Thyroid is not enlarged.  Pulmonary: Clear to ausculation.  Normal effort. No rales, ronchi, or wheezing.  Cardiovascular: Regular rate and rhythm without murmur.  Abdomen: Soft, nontender, nondistended. Normal bowel sounds. Liver and spleen are not palpable  Neurologic: Grossly nonfocal.  Cranial nerves are normal.   Lymph: No cervical, supraclavicular or axillary lymph nodes are palpable  Skin: Warm and dry.  No rashes or suspicious skin lesions.  Musculoskeletal: Normal gait. No extremity cyanosis, clubbing, or edema.  L4-L5 is tender to palpation.  Negative for paraspinal muscle tenderness with palpation.  Negative for erythema/warmth/ecchymosis or trauma.  No signs of swelling.  Psych: Normal mood and affect. Alert and oriented x3. Judgment and insight is normal.    Medical decision-making and discussion:  1. Acute midline low back pain without sciatica  2. Chronic use of opiate drugs therapeutic purposes    Lumbar x-ray has been ordered to further evaluate patient.  Patient will be contacted with results.  Advised patient if results do not show alarming findings to follow-up with physical therapy.  A physical therapy referral has been placed during today's appointment.  Advised patient to take prescribed Norco 3 times daily.  Also advised patient he can take up to 3000 mg of Tylenol per day for symptoms.  Discussed with patient if he takes Norco 3 times a day he is getting a total of 975 mg of acetaminophen.  Advised patient to consider taking Tylenol in between Norco if he is still experiencing pain symptoms.  Advised patient to rest, stretch, stay hydrated.  Discussed the importance of using proper body mechanics with lifting/pushing/moving heavy  "objects.      Patient understands that Norco is a controlled substance which is potentially habit-forming and its use is regulated by the [facility]. We also discussed the new \"black box\" warning regarding the lethal combination of opioids and benzodiazepines. Narcotics have may adverse effects and the risks of addiction, accidental overdose and death were emphasized.     Discussed with patient that he may need to follow-up with his PCP for [place] refill.    Follow-up for worsening symptoms,lack of expected recovery, or should new symptoms or problems arise.      - DX-LUMBAR SPINE-2 OR 3 VIEWS; Future  - REFERRAL TO PHYSICAL THERAPY Reason for Therapy: Eval/Treat/Report        Please note that this dictation was created using voice recognition software. I have made every reasonable attempt to correct obvious errors, but I expect that there are errors of grammar and possibly content that I did not discover before finalizing the note.      Return if symptoms worsen or fail to improve.  "

## 2018-12-24 ENCOUNTER — HOSPITAL ENCOUNTER (OUTPATIENT)
Dept: RADIOLOGY | Facility: MEDICAL CENTER | Age: 73
End: 2018-12-24
Attending: PHYSICIAN ASSISTANT
Payer: COMMERCIAL

## 2018-12-24 DIAGNOSIS — M54.50 ACUTE MIDLINE LOW BACK PAIN WITHOUT SCIATICA: ICD-10-CM

## 2018-12-24 PROCEDURE — 72100 X-RAY EXAM L-S SPINE 2/3 VWS: CPT

## 2019-01-10 ENCOUNTER — OFFICE VISIT (OUTPATIENT)
Dept: MEDICAL GROUP | Age: 74
End: 2019-01-10
Payer: COMMERCIAL

## 2019-01-10 VITALS
DIASTOLIC BLOOD PRESSURE: 82 MMHG | SYSTOLIC BLOOD PRESSURE: 122 MMHG | HEART RATE: 100 BPM | TEMPERATURE: 97.3 F | WEIGHT: 195 LBS | OXYGEN SATURATION: 96 % | HEIGHT: 69 IN | BODY MASS INDEX: 28.88 KG/M2

## 2019-01-10 DIAGNOSIS — J01.00 ACUTE MAXILLARY SINUSITIS, RECURRENCE NOT SPECIFIED: ICD-10-CM

## 2019-01-10 PROCEDURE — 99213 OFFICE O/P EST LOW 20 MIN: CPT | Performed by: INTERNAL MEDICINE

## 2019-01-10 RX ORDER — CLINDAMYCIN HYDROCHLORIDE 300 MG/1
300 CAPSULE ORAL 4 TIMES DAILY
Qty: 40 CAP | Refills: 1 | Status: SHIPPED | OUTPATIENT
Start: 2019-01-10 | End: 2019-05-25

## 2019-01-10 ASSESSMENT — ENCOUNTER SYMPTOMS
PSYCHIATRIC NEGATIVE: 1
CHILLS: 0
NEUROLOGICAL NEGATIVE: 1
MYALGIAS: 1
EYES NEGATIVE: 1
RESPIRATORY NEGATIVE: 1
GASTROINTESTINAL NEGATIVE: 1
FEVER: 0
CARDIOVASCULAR NEGATIVE: 1

## 2019-01-10 ASSESSMENT — PATIENT HEALTH QUESTIONNAIRE - PHQ9: CLINICAL INTERPRETATION OF PHQ2 SCORE: 0

## 2019-01-10 NOTE — PROGRESS NOTES
Subjective:   New patient to me unable see PCP today.  Alfonso Gaspar is a 73 y.o. male who presents with Sinusitis (x2 weeks)        HPI    The patient is here for followup. He has established care with Dr. Greco.     He has had nasal congestion over the past several weeks. Patient reports associated rhinorrhea, cough with sputum production, mild headaches, and mild myalgias. He describes his rhinorrhea as green in color. His symptoms are worsened when lying down. He denies facial pain, fever, chills. He has a history of similar symptoms but states it has been a long time since he has experienced this.     He is up to date on his influenza vaccination.       Patient Active Problem List   Diagnosis   • Essential hypertension, benign   • Idiopathic chronic gout of multiple sites without tophus   • Screening for colon cancer   • Preventative health care   • Medicare annual wellness visit, initial   • AK (actinic keratosis)   • Inflamed seborrheic keratosis   • S/P colonoscopy- 2016 [facility]   • Neck pain   • Chronic use of opiate drugs therapeutic purposes   • Benign prostatic hyperplasia without lower urinary tract symptoms   • Acute midline low back pain without sciatica       Outpatient Medications Prior to Visit   Medication Sig Dispense Refill   • allopurinol (ZYLOPRIM) 300 MG Tab TAKE 1 TABLET BY MOUTH EVERY DAY 90 Tab 0   • docusate sodium (COLACE) 100 MG Cap Take 1 Cap by mouth 2 times a day. 180 Cap 0   • Psyllium (METAMUCIL FIBER) 51.7 % Pack Take 1 Application by mouth 2 Times a Day. 90 Each 0   • enalapril (VASOTEC) 10 MG Tab Take 1 Tab by mouth every day. 90 Tab 0   • Calcium Carbonate-Vitamin D (CALCIUM-D) 600-400 MG-UNIT Tab Take 2 Tabs by mouth every day.     • aspirin EC (ECOTRIN) 81 MG Tablet Delayed Response Take 81 mg by mouth every day.     • Multiple Vitamins-Minerals (MENS 50+ MULTI VITAMIN/MIN) Tab Take 1 Tab by mouth every day.     • Misc Natural Products (GLUCOSAMINE CHOND COMPLEX/MSM) Tab Take  "2 Tabs by mouth every day.     • Omega-3 Fatty Acids (FISH OIL) 1000 MG Cap capsule Take 1,000 mg by mouth every day.     • enalapril (VASOTEC) 10 MG Tab TAKE 1 TABLET BY MOUTH EVERY DAY (Patient not taking: Reported on 1/10/2019) 90 Tab 0   • Misc. Devices Misc Please provide patient with shingles vaccine as well as TDAP 1 Application 0   • clindamycin (CLEOCIN) 150 MG Cap Take 1 Cap by mouth 3 times a day. (Patient not taking: Reported on 9/6/2016) 40 Cap 0     No facility-administered medications prior to visit.         Allergies   Allergen Reactions   • Pcn [Penicillins] Hives       Review of Systems   Constitutional: Negative for chills and fever.   HENT: Positive for congestion.         Rhinorrhea   No facial pain    Eyes: Negative.    Respiratory: Negative.    Cardiovascular: Negative.    Gastrointestinal: Negative.    Genitourinary: Negative.    Musculoskeletal: Positive for myalgias.   Skin: Negative.    Neurological: Negative.    Endo/Heme/Allergies: Negative.    Psychiatric/Behavioral: Negative.    All other systems reviewed and are negative.           Objective:     /72 (BP Location: Right arm, Patient Position: Sitting)   Pulse 73   Temp 36.3 °C (97.3 °F) (Temporal)   Ht 1.562 m (5' 1.5\")   Wt 117.5 kg (259 lb)   SpO2 95%   BMI 48.15 kg/m²      Physical Exam   Constitutional: Oriented to person, place, and time. Appears well-developed and well-nourished. No distress.   Head: Normocephalic and atraumatic.   Right Ear: External ear normal.   Left Ear: External ear normal.   Nose: Yellow discharge from nose.   Mouth/Throat: Oropharynx is clear and moist. No oropharyngeal exudate.   Eyes: Pupils are equal, round, and reactive to light. Conjunctivae and EOM are normal. Right eye exhibits no discharge. Left eye exhibits no discharge. No scleral icterus.   Neck: Normal range of motion. Neck supple. No JVD present. No tracheal deviation present. No thyromegaly present.   Cardiovascular: Normal rate, " regular rhythm, normal heart sounds and intact distal pulses.  Exam reveals no gallop and no friction rub.    No murmur heard.  Pulmonary/Chest: Effort normal. No stridor. No respiratory distress. No wheezing or rales. No tenderness.   Abdominal: Soft. Bowel sounds are normal. No distension and no mass. There is no tenderness. There is no rebound and no guarding. No hernia.   Musculoskeletal: Normal range of motion No edema or tenderness.   Lymphadenopathy: No cervical adenopathy.   Neurological: Alert and oriented to person, place, and time. Normal reflexes. Normal reflexes. No cranial nerve deficit. Normal muscle tone. Coordination normal.   Skin: Skin is warm and dry. No rash noted. Not diaphoretic. No erythema. No pallor.   Psychiatric: Normal mood and affect. Behavior is normal. Judgment and thought content normal.   Nursing note and vitals reviewed.      No results found for: HBA1C  Lab Results   Component Value Date/Time    SODIUM 137 08/02/2017 08:21 AM    POTASSIUM 4.4 08/02/2017 08:21 AM    CHLORIDE 106 08/02/2017 08:21 AM    CO2 23 08/02/2017 08:21 AM    GLUCOSE 93 08/02/2017 08:21 AM    BUN 21 08/02/2017 08:21 AM    CREATININE 0.65 08/02/2017 08:21 AM    CREATININE 0.9 04/10/2007 12:35 PM    ALKPHOSPHAT 61 08/02/2017 08:21 AM    ASTSGOT 22 08/02/2017 08:21 AM    ALTSGPT 18 08/02/2017 08:21 AM    TBILIRUBIN 1.0 08/02/2017 08:21 AM     Lab Results   Component Value Date/Time    INR 1.06 04/10/2007 12:35 PM     Lab Results   Component Value Date/Time    CHOLSTRLTOT 209 (H) 08/02/2017 08:21 AM     (H) 08/02/2017 08:21 AM    HDL 51 08/02/2017 08:21 AM    TRIGLYCERIDE 77 08/02/2017 08:21 AM       No results found for: TESTOSTERONE  No results found for: TSH  No results found for: FREET4  No results found for: URICACID  No components found for: VITB12  No results found for: 25HYDROXY       Assessment/Plan:     1. Acute maxillary sinusitis, recurrence not specified  Acute, has been occurring for several  weeks. He will be given a prescription for Clindamycin and recommended follow up with Dr. Greco.   - clindamycin (CLEOCIN) 300 MG Cap; Take 1 Cap by mouth 4 times a day.  Dispense: 40 Cap; Refill: 1      30 minute face-to-face encounter took place today.  More than half of this time was spent in the coordination of care of the above problems, as well as counseling.     IClementine (Scribevan), am scribing for, and in the presence of, Jerry Jarrett M.D..    Electronically signed by: Clementine Crocker (Yessenia), 1/10/2019    IJerry M.D., personally performed the services described in this documentation, as scribed by Clementine Crocker in my presence, and it is both accurate and complete.

## 2019-01-11 DIAGNOSIS — M1A.09X0 CHRONIC GOUT OF MULTIPLE SITES, UNSPECIFIED CAUSE: ICD-10-CM

## 2019-01-11 DIAGNOSIS — I10 ESSENTIAL HYPERTENSION: ICD-10-CM

## 2019-01-11 RX ORDER — ENALAPRIL MALEATE 10 MG/1
10 TABLET ORAL
Qty: 90 TAB | Refills: 0 | Status: SHIPPED | OUTPATIENT
Start: 2019-01-11 | End: 2019-08-11 | Stop reason: SDUPTHER

## 2019-01-11 RX ORDER — ALLOPURINOL 300 MG/1
TABLET ORAL
Qty: 90 TAB | Refills: 0 | Status: SHIPPED | OUTPATIENT
Start: 2019-01-11 | End: 2019-08-11 | Stop reason: SDUPTHER

## 2019-04-02 ENCOUNTER — OFFICE VISIT (OUTPATIENT)
Dept: MEDICAL GROUP | Age: 74
End: 2019-04-02
Payer: COMMERCIAL

## 2019-04-02 VITALS
HEART RATE: 99 BPM | WEIGHT: 198.4 LBS | SYSTOLIC BLOOD PRESSURE: 116 MMHG | HEIGHT: 69 IN | BODY MASS INDEX: 29.38 KG/M2 | OXYGEN SATURATION: 94 % | TEMPERATURE: 97.1 F | DIASTOLIC BLOOD PRESSURE: 84 MMHG

## 2019-04-02 DIAGNOSIS — M54.50 ACUTE MIDLINE LOW BACK PAIN WITHOUT SCIATICA: ICD-10-CM

## 2019-04-02 DIAGNOSIS — L82.0 INFLAMED SEBORRHEIC KERATOSIS: ICD-10-CM

## 2019-04-02 DIAGNOSIS — L57.0 AK (ACTINIC KERATOSIS): ICD-10-CM

## 2019-04-02 DIAGNOSIS — Z00.00 PREVENTATIVE HEALTH CARE: ICD-10-CM

## 2019-04-02 DIAGNOSIS — N40.0 BENIGN PROSTATIC HYPERPLASIA WITHOUT LOWER URINARY TRACT SYMPTOMS: ICD-10-CM

## 2019-04-02 DIAGNOSIS — I10 ESSENTIAL HYPERTENSION: ICD-10-CM

## 2019-04-02 PROBLEM — Z79.891 CHRONIC USE OF OPIATE DRUGS THERAPEUTIC PURPOSES: Status: RESOLVED | Noted: 2018-03-27 | Resolved: 2019-04-02

## 2019-04-02 PROCEDURE — 17110 DESTRUCTION B9 LES UP TO 14: CPT | Performed by: FAMILY MEDICINE

## 2019-04-02 PROCEDURE — 17003 DESTRUCT PREMALG LES 2-14: CPT | Mod: 59 | Performed by: FAMILY MEDICINE

## 2019-04-02 PROCEDURE — 99214 OFFICE O/P EST MOD 30 MIN: CPT | Mod: 25 | Performed by: FAMILY MEDICINE

## 2019-04-02 PROCEDURE — 17000 DESTRUCT PREMALG LESION: CPT | Mod: 59 | Performed by: FAMILY MEDICINE

## 2019-04-02 ASSESSMENT — PAIN SCALES - GENERAL: PAINLEVEL: 5=MODERATE PAIN

## 2019-04-03 NOTE — PROGRESS NOTES
This medical record contains text that has been entered with the assistance of computer voice recognition and dictation software.  Therefore, it may contain unintended errors in text, spelling, punctuation, or grammar    Chief Complaint   Patient presents with   • Annual Exam   • Arthritis         Alfonso Gaspar is a 73 y.o. male here evaluation and management of: annual      HPI:     Acute midline low back pain without sciatica  Patient states that he was recently seen in urgent care for back pain he was sent to physical therapy and he is noticed some improvement.  No longer takes pain medication only uses Advil as needed.  He denies any loss of bladder or bowel function.    Hypertension  Enalapril 10 mg p.o. Daily.    Home BP readings--- not monitoring    The patient has been tollerating the BP meds without any issues. No tunnel vision, no cough, no changes in vision, no lightheadedness, no fatigue, no syncopal or presyncopal episodes, no edema, no new rashes.     The patient also denies chest pain, no dyspnea on exertion, no headaches, no numbness or tingling, no changes in vision, no weakness in any extremity, no back pain no changes in micturition.        [facility] care  Patient is a very pleasant 73-year-old male who returns to clinic for his yearly visit.  He has a significant past medical history of hypertension gout,  actinic keratosis, ISK.   The patient denied any chest pain, no sob, no monroy, no  pnd, no orthopnea, no headache, no changes in vision, no numbness or tingling, no nausea, no diarrhea, no abdominal pain, no fevers, no chills, no bright red blood per rectum, no  difficulty urinating, no burning during micturition, no depressed mood, no other concerns.           Current medicines (including changes today)  Current Outpatient Prescriptions   Medication Sig Dispense Refill   • allopurinol (ZYLOPRIM) 300 MG Tab TAKE 1 TABLET BY MOUTH EVERY DAY 90 Tab 0   • enalapril (VASOTEC) 10 MG Tab  TAKE 1 TABLET BY MOUTH EVERY DAY 90 Tab 0   • Calcium Carbonate-Vitamin D (CALCIUM-D) 600-400 MG-UNIT Tab Take 2 Tabs by mouth every day.     • Multiple Vitamins-Minerals (MENS 50+ MULTI VITAMIN/MIN) Tab Take 1 Tab by mouth every day.     • Misc Natural Products (GLUCOSAMINE CHOND COMPLEX/MSM) Tab Take 2 Tabs by mouth every day.     • Omega-3 Fatty Acids (FISH OIL) 1000 MG Cap capsule Take 1,000 mg by mouth every day.     • enalapril (VASOTEC) 10 MG Tab Take 1 Tab by mouth every day. 90 Tab 0   • clindamycin (CLEOCIN) 300 MG Cap Take 1 Cap by mouth 4 times a day. (Patient not taking: Reported on 2019) 40 Cap 1   • docusate sodium (COLACE) 100 MG Cap Take 1 Cap by mouth 2 times a day. (Patient not taking: Reported on 2019) 180 Cap 0   • aspirin EC (ECOTRIN) 81 MG Tablet Delayed Response Take 81 mg by mouth every day.       No current facility-administered medications for this visit.      He  has a past medical history of Gout; Gout; and Hypertension.  He  has a past surgical history that includes pr removal spleen, total and pr removal of tonsils,<10 y/o.  Social History   Substance Use Topics   • Smoking status: Former Smoker     Packs/day: 0.25     Years: 10.00     Types: Cigarettes     Quit date: 1975   • Smokeless tobacco: Never Used   • Alcohol use 4.2 oz/week     2 Glasses of wine, 5 Cans of beer per week      Comment: occ     Social History     Social History Narrative   • No narrative on file     Family History   Problem Relation Age of Onset   • Diabetes Father    • No Known Problems Maternal Grandmother    • Cancer Maternal Grandfather         Lung cancer   • No Known Problems Paternal Grandmother    • No Known Problems Paternal Grandfather    • Other Sister         polio     Family Status   Relation Status   • Fa  at age 74        heart attack   • Mo    • Sis Alive   • MGMo    • MGFa    • PGMo    • PGFa    • Sis Alive         ROS    Please see  "hpi     All other systems reviewed and are negative     Objective:     Blood pressure 116/84, pulse 99, temperature 36.2 °C (97.1 °F), temperature source Temporal, height 1.753 m (5' 9\"), weight 90 kg (198 lb 6.4 oz), SpO2 94 %. Body mass index is 29.3 kg/m².  Physical Exam:    Constitutional: Alert, no distress.  Skin: Warm, dry, good turgor, no rashes in visible areas  Eye: Equal, round and reactive, conjunctiva clear, lids normal.  ENMT: Lips without lesions, good dentition, oropharynx clear.  Neck: Trachea midline, no masses, no thyromegaly. No cervical or supraclavicular lymphadenopathy.  Respiratory: Unlabored respiratory effort, lungs clear to auscultation, no wheezes, no ronchi.  Cardiovascular: Normal S1, S2, no murmur, no edema  Abdomen: Soft, non-tender, no masses, no hepatosplenomegaly.  Psych: Alert and oriented x3, normal affect and mood.  Meena Causey - Benign  Date/Time: 4/2/2019 5:22 PM  Performed by: STARR LUCIA  Authorized by: STARR LUCIA     Number of Lesions: 3  Lesion 1:     Body area: head/neck    Head/neck location: forehead    Malignancy: malignancy unknown      Destruction method: cryotherapy    Lesion 2:     Body area: head/neck    Head/neck location: R cheek    Malignancy: malignancy unknown      Destruction method: cryotherapy    Lesion 3:     Body area: head/neck    Head/neck location: forehead    Malignancy: malignancy unknown      Destruction method: cryotherapy    Meena Causey - Benign  Date/Time: 4/2/2019 5:26 PM  Performed by: STARR LUCIA  Authorized by: STARR LUCIA     Number of Lesions: 2  Lesion 1:     Body area: head/neck    Head/neck location: neck    Malignancy: benign lesion      Destruction method: cryotherapy    Lesion 2:     Body area: head/neck    Head/neck location: neck    Malignancy: benign lesion      Destruction method: cryotherapy      Comments:    SKIN EXAM    ISK  Description--  Several irregular, " pigmented, verrucous surface plaques with dried hemmorhage      Size 0.2cm--0.4cm  cm on neck    Symptoms  Patient has been complaining of lesions which have been irritating, bleeding when washing,  flaking,getting caught on clothing and jewelry, painful and causing discomfort so is interested in removal.      AK  multiple lesions on bilateral forearms, face,hands, and chest, with evidence of of solar damage present , spotty hyperpigmentation, scattered telangiectasias, and  Xerosis      PROCEDURE: CRYOTHERAPY  Discussed risks and benefits of cryotherapy including but not limited to scarring, hyperpigmentation, hypopigmentation, hypertrophic scarring, keiloid scarring, incomplete or no resolution of lesions treated,pain, undesirable cosemetic result, blistering, potential need for additional treatment including more invasive treatment. Patient expresses understanding and verbally acknowledges risks and consent to treatment. 2  applications of cryotherapy with 3 second freeze thaw cycle was applied to all AK's and  all irritated and inflamed Seborrheic Keratoses. Patient tolerated procedure well. There were no complications. Aftercare instructions given.              Assessment and Plan:   The following treatment plan was discussed      1. Benign prostatic hyperplasia without lower urinary tract symptoms    - PROSTATE SPECIFIC AG DIAGNOSTIC; Future    2. Essential hypertension  We will obtain new labs to update clinical profile.  Then we will adjust therapy as needed.    - CBC WITHOUT DIFFERENTIAL; Future  - Comp Metabolic Panel; Future  - Lipid Profile; Future    3. AK (actinic keratosis)  Patient tollerrated procedure well  There were no adverse events  Patient was given post procedure precautions       4. Inflamed seborrheic keratosis  Patient tollerrated procedure well  There were no adverse events  Patient was given post procedure precautions       5. Acute midline low back pain without sciatica  Patient was  instructed on activity modification ×2 weeks  Use the brace that  was given in clinic for 2 weeks with activity  NSAIDs when necessary  Ice when necessary and compression    6. Preventative health care    Due for flu, shingrix, and Tdap          Instructed to Follow up in clinic or ER for worsening symptoms, difficulty breathing, lack of expected recovery, or should new symptoms or problems arise.    Followup: Return in about 6 months (around 10/2/2019) for Reevaluation, labs.       Once again this medical record contains text that has been entered with the assistance of computer voice recognition and dictation software.  Therefore, it may contain unintended errors in text, spelling, punctuation, or grammar

## 2019-04-03 NOTE — ASSESSMENT & PLAN NOTE
Patient is a very pleasant 73-year-old male who returns to clinic for his yearly visit.  He has a significant past medical history of hypertension gout,  actinic keratosis, ISK.   The patient denied any chest pain, no sob, no monroy, no  pnd, no orthopnea, no headache, no changes in vision, no numbness or tingling, no nausea, no diarrhea, no abdominal pain, no fevers, no chills, no bright red blood per rectum, no  difficulty urinating, no burning during micturition, no depressed mood, no other concerns.

## 2019-04-03 NOTE — ASSESSMENT & PLAN NOTE
Enalapril 10 mg p.o. Daily.    Home BP readings--- not monitoring    The patient has been tollerating the BP meds without any issues. No tunnel vision, no cough, no changes in vision, no lightheadedness, no fatigue, no syncopal or presyncopal episodes, no edema, no new rashes.     The patient also denies chest pain, no dyspnea on exertion, no headaches, no numbness or tingling, no changes in vision, no weakness in any extremity, no back pain no changes in micturition.

## 2019-04-03 NOTE — ASSESSMENT & PLAN NOTE
Patient states that he was recently seen in urgent care for back pain he was sent to physical therapy and he is noticed some improvement.  No longer takes pain medication only uses Advil as needed.  He denies any loss of bladder or bowel function.

## 2019-05-25 ENCOUNTER — APPOINTMENT (OUTPATIENT)
Dept: RADIOLOGY | Facility: MEDICAL CENTER | Age: 74
End: 2019-05-25
Attending: EMERGENCY MEDICINE
Payer: COMMERCIAL

## 2019-05-25 ENCOUNTER — HOSPITAL ENCOUNTER (EMERGENCY)
Facility: MEDICAL CENTER | Age: 74
End: 2019-05-25
Attending: EMERGENCY MEDICINE
Payer: COMMERCIAL

## 2019-05-25 VITALS
SYSTOLIC BLOOD PRESSURE: 111 MMHG | WEIGHT: 195.33 LBS | TEMPERATURE: 97.8 F | HEART RATE: 78 BPM | HEIGHT: 69 IN | BODY MASS INDEX: 28.93 KG/M2 | RESPIRATION RATE: 18 BRPM | DIASTOLIC BLOOD PRESSURE: 69 MMHG | OXYGEN SATURATION: 97 %
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
## 2019-05-25 DIAGNOSIS — S50.311A ABRASION OF RIGHT ELBOW, INITIAL ENCOUNTER: ICD-10-CM

## 2019-05-25 DIAGNOSIS — S70.01XA CONTUSION OF RIGHT HIP, INITIAL ENCOUNTER: ICD-10-CM

## 2019-05-25 PROCEDURE — 73502 X-RAY EXAM HIP UNI 2-3 VIEWS: CPT | Mod: RT

## 2019-05-25 PROCEDURE — 99283 EMERGENCY DEPT VISIT LOW MDM: CPT

## 2019-05-25 ASSESSMENT — ENCOUNTER SYMPTOMS
DIZZINESS: 0
LOSS OF CONSCIOUSNESS: 0
VOMITING: 0
SPEECH CHANGE: 0
HEADACHES: 0
FOCAL WEAKNESS: 0
BACK PAIN: 0
SHORTNESS OF BREATH: 0
NECK PAIN: 0
NAUSEA: 0

## 2019-05-26 NOTE — ED PROVIDER NOTES
ED Provider Note   5/25/2019  6:24 PM    Means of Arrival: Walk In  History obtained by: patient and spouse  Limitations:     CHIEF COMPLAINT  Chief Complaint   Patient presents with   • Fall     about 2 1/2 hours go  was pulling on rope and it broke  pt landing on R side  injuring R hip and elbow       HPI  Alfonso Gaspar is a 74 y.o. male complains of right hip pain.  He says he was outside pulling a rope when he suddenly fell onto his right side.  Says he fell onto his right hip, elbow, and his head made minimal contact with hard ground.  He was able to get himself up and ambulate.  His wife placed Band-Aids on abrasions that his right elbow.  He complains of sharp, soreness when putting full weight onto right hip.  He noticed a large bruise at his right hip after the incident.  He does not take blood thinners.  This happened approximately 2.5 hours ago.    REVIEW OF SYSTEMS  Review of Systems   Respiratory: Negative for shortness of breath.    Gastrointestinal: Negative for nausea and vomiting.   Musculoskeletal: Positive for joint pain. Negative for back pain and neck pain.   Neurological: Negative for dizziness, speech change, focal weakness, loss of consciousness and headaches.     See HPI for further details.     PAST MEDICAL HISTORY   has a past medical history of Gout; Gout; and Hypertension.    SOCIAL HISTORY  Social History     Social History Main Topics   • Smoking status: Former Smoker     Packs/day: 0.25     Years: 10.00     Types: Cigarettes     Quit date: 12/1/1975   • Smokeless tobacco: Never Used   • Alcohol use 4.2 oz/week     2 Glasses of wine, 5 Cans of beer per week      Comment: occ   • Drug use: No   • Sexual activity: Not Currently       SURGICAL HISTORY   has a past surgical history that includes removal spleen, total and removal of tonsils,<10 y/o.    CURRENT MEDICATIONS  Home Medications     Reviewed by Karey Andersen R.N. (Registered Nurse) on 05/25/19 at 1738  Med List Status:  "Partial   Medication Last Dose Status   allopurinol (ZYLOPRIM) 300 MG Tab 5/25/2019 Active   Calcium Carbonate-Vitamin D (CALCIUM-D) 600-400 MG-UNIT Tab 5/25/2019 Active   enalapril (VASOTEC) 10 MG Tab 5/25/2019 Active   Misc Natural Products (GLUCOSAMINE CHOND COMPLEX/MSM) Tab 5/25/2019 Active   Multiple Vitamins-Minerals (MENS 50+ MULTI VITAMIN/MIN) Tab 5/25/2019 Active   Omega-3 Fatty Acids (FISH OIL) 1000 MG Cap capsule 5/25/2019 Active                ALLERGIES  Allergies   Allergen Reactions   • Pcn [Penicillins] Hives       PHYSICAL EXAM  VITAL SIGNS: /75   Pulse 97   Temp 36.5 °C (97.7 °F) (Temporal)   Resp 18   Ht 1.753 m (5' 9\")   Wt 88.6 kg (195 lb 5.2 oz)   SpO2 97%   BMI 28.84 kg/m²    Pulse ox interpretation: I interpret this pulse ox as normal.  Constitutional: Alert in no apparent distress.  HENT: Normocephalic, Atraumatic, Bilateral external ears normal. Nose normal.   Eyes: Pupils are equal. Conjunctiva normal, non-icteric.   Heart: Regular rate and rythm, no murmurs.    Lungs: No respiratory distress, regular respirations. Clear to auscultation bilaterally.  Abdomen: Normal appearance, nondistended, nontender.  Skin: Warm, Dry, No erythema, No rash.   Neurologic: Alert, Grossly non-focal. No slurred speech. Moving extremities normally.   MSK: Right elbow abrasions. Full range of motion and no tenderness. Ecchymosis at right posterior lateral hip. Able to stand but to fully weight bear there is pain. He is ambulatory but favors opposite leg.   Psychiatric: Affect normal, Judgment normal, Mood normal, Appears appropriate and not intoxicated.   Physical Exam      COURSE & MEDICAL DECISION MAKING  Pertinent Labs & Imaging studies reviewed. (See chart for details)    6:24 PM This is an emergent evaluation of a 74 y.o., male who presents with with right hip pain after ground level fall. Low suspicion for elbow injury or head injury. Right hip with ecchymosis and pain when fully bearing " weight. Good strength with flexion and extension at ankle, knee, and hip. Plan to xray hip for possible fracture.     6:54 PM  Xray negative. Provided reassurance and asked him to follow up with primary provider if continued pain for more than 1 week.      The patient will return for worsening symptoms and is stable at the time of discharge. The patient verbalizes understanding. Guidance was provided on appropriate use of medications including driving under the influence, overdose, and side effects.     FINAL IMPRESSION  1. Fall from ground level    2. Contusion of right hip, initial encounter    3. Abrasion of right elbow, initial encounter               Electronically signed by: Chava Bassett II, 5/25/2019 6:24 PM

## 2019-05-26 NOTE — ED TRIAGE NOTES
Pt BIB wife  Earlier today was pulling on a rope that suddenly broke  Pt fell, landing on his R side causing pain and abrasions to R elbow and now swelling and bruising to R hip  Very painful w/ ambulation

## 2019-06-25 ENCOUNTER — APPOINTMENT (RX ONLY)
Dept: URBAN - METROPOLITAN AREA CLINIC 4 | Facility: CLINIC | Age: 74
Setting detail: DERMATOLOGY
End: 2019-06-25

## 2019-06-25 DIAGNOSIS — L82.1 OTHER SEBORRHEIC KERATOSIS: ICD-10-CM

## 2019-06-25 DIAGNOSIS — Z71.89 OTHER SPECIFIED COUNSELING: ICD-10-CM

## 2019-06-25 DIAGNOSIS — L57.0 ACTINIC KERATOSIS: ICD-10-CM

## 2019-06-25 PROCEDURE — ? LIQUID NITROGEN

## 2019-06-25 PROCEDURE — ? COUNSELING

## 2019-06-25 PROCEDURE — 17000 DESTRUCT PREMALG LESION: CPT

## 2019-06-25 PROCEDURE — 99212 OFFICE O/P EST SF 10 MIN: CPT | Mod: 25

## 2019-06-25 PROCEDURE — 17003 DESTRUCT PREMALG LES 2-14: CPT

## 2019-06-25 ASSESSMENT — LOCATION ZONE DERM
LOCATION ZONE: FACE
LOCATION ZONE: EYELID
LOCATION ZONE: NOSE
LOCATION ZONE: FACE

## 2019-06-25 ASSESSMENT — LOCATION DETAILED DESCRIPTION DERM
LOCATION DETAILED: LEFT SUPERIOR CENTRAL MALAR CHEEK
LOCATION DETAILED: LEFT NASAL SIDEWALL
LOCATION DETAILED: LEFT SUPERIOR CENTRAL MALAR CHEEK
LOCATION DETAILED: LEFT LATERAL CANTHUS
LOCATION DETAILED: LEFT MID PREAURICULAR CHEEK
LOCATION DETAILED: LEFT LATERAL MALAR CHEEK
LOCATION DETAILED: LEFT MID TEMPLE

## 2019-06-25 ASSESSMENT — LOCATION SIMPLE DESCRIPTION DERM
LOCATION SIMPLE: LEFT EYELID
LOCATION SIMPLE: LEFT TEMPLE
LOCATION SIMPLE: LEFT CHEEK
LOCATION SIMPLE: LEFT CHEEK
LOCATION SIMPLE: LEFT NOSE

## 2019-06-25 NOTE — PROCEDURE: LIQUID NITROGEN
Number Of Freeze-Thaw Cycles: 2 freeze-thaw cycles
Detail Level: Simple
Render Post-Care Instructions In Note?: no
Duration Of Freeze Thaw-Cycle (Seconds): 3
Post-Care Instructions: I reviewed with the patient in detail post-care instructions. Patient is to wear sunprotection, and avoid picking at any of the treated lesions. Pt may apply Vaseline to crusted or scabbing areas.
Consent: The patient's consent was obtained including but not limited to risks of crusting, scabbing, blistering, scarring, darker or lighter pigmentary change, recurrence, incomplete removal and infection.
Duration Of Freeze Thaw-Cycle (Seconds): 0
Medical Necessity Clause: This procedure was medically necessary because the lesions that were treated were:
Detail Level: Detailed
Medical Necessity Information: It is in your best interest to select a reason for this procedure from the list below. All of these items fulfill various CMS LCD requirements except the new and changing color options.

## 2019-07-03 LAB
ALBUMIN SERPL-MCNC: 4 G/DL (ref 3.5–4.8)
ALBUMIN/GLOB SERPL: 1.5 {RATIO} (ref 1.2–2.2)
ALP SERPL-CCNC: 69 IU/L (ref 39–117)
ALT SERPL-CCNC: 18 IU/L (ref 0–44)
AST SERPL-CCNC: 24 IU/L (ref 0–40)
BASOPHILS # BLD AUTO: 0 X10E3/UL (ref 0–0.2)
BASOPHILS NFR BLD AUTO: 0 %
BILIRUB SERPL-MCNC: 0.8 MG/DL (ref 0–1.2)
BUN SERPL-MCNC: 21 MG/DL (ref 8–27)
BUN/CREAT SERPL: 27 (ref 10–24)
CALCIUM SERPL-MCNC: 9.2 MG/DL (ref 8.6–10.2)
CHLORIDE SERPL-SCNC: 104 MMOL/L (ref 96–106)
CHOLEST SERPL-MCNC: 193 MG/DL (ref 100–199)
CO2 SERPL-SCNC: 21 MMOL/L (ref 20–29)
CREAT SERPL-MCNC: 0.78 MG/DL (ref 0.76–1.27)
EOSINOPHIL # BLD AUTO: 0.1 X10E3/UL (ref 0–0.4)
EOSINOPHIL NFR BLD AUTO: 2 %
ERYTHROCYTE [DISTWIDTH] IN BLOOD BY AUTOMATED COUNT: 17.9 % (ref 12.3–15.4)
GLOBULIN SER CALC-MCNC: 2.7 G/DL (ref 1.5–4.5)
GLUCOSE SERPL-MCNC: 91 MG/DL (ref 65–99)
HCT VFR BLD AUTO: 47.2 % (ref 37.5–51)
HDLC SERPL-MCNC: 51 MG/DL
HGB BLD-MCNC: 15.9 G/DL (ref 13–17.7)
IMM GRANULOCYTES # BLD AUTO: 0 X10E3/UL (ref 0–0.1)
IMM GRANULOCYTES NFR BLD AUTO: 0 %
IMMATURE CELLS  115398: ABNORMAL
LABORATORY COMMENT REPORT: ABNORMAL
LDLC SERPL CALC-MCNC: 128 MG/DL (ref 0–99)
LYMPHOCYTES # BLD AUTO: 3.8 X10E3/UL (ref 0.7–3.1)
LYMPHOCYTES NFR BLD AUTO: 61 %
MCH RBC QN AUTO: 32.6 PG (ref 26.6–33)
MCHC RBC AUTO-ENTMCNC: 33.7 G/DL (ref 31.5–35.7)
MCV RBC AUTO: 97 FL (ref 79–97)
MONOCYTES # BLD AUTO: 0.2 X10E3/UL (ref 0.1–0.9)
MONOCYTES NFR BLD AUTO: 3 %
MORPHOLOGY BLD-IMP: ABNORMAL
NEUTROPHILS # BLD AUTO: 2.2 X10E3/UL (ref 1.4–7)
NEUTROPHILS NFR BLD AUTO: 34 %
NRBC BLD AUTO-RTO: ABNORMAL %
PLATELET # BLD AUTO: 194 X10E3/UL (ref 150–450)
POTASSIUM SERPL-SCNC: 4.6 MMOL/L (ref 3.5–5.2)
PROT SERPL-MCNC: 6.7 G/DL (ref 6–8.5)
PSA SERPL-MCNC: 3.6 NG/ML (ref 0–4)
RBC # BLD AUTO: 4.88 X10E6/UL (ref 4.14–5.8)
SODIUM SERPL-SCNC: 139 MMOL/L (ref 134–144)
TRIGL SERPL-MCNC: 72 MG/DL (ref 0–149)
VLDLC SERPL CALC-MCNC: 14 MG/DL (ref 5–40)
WBC # BLD AUTO: 6.3 X10E3/UL (ref 3.4–10.8)

## 2019-07-15 ENCOUNTER — OFFICE VISIT (OUTPATIENT)
Dept: MEDICAL GROUP | Age: 74
End: 2019-07-15
Payer: COMMERCIAL

## 2019-07-15 VITALS
BODY MASS INDEX: 28.29 KG/M2 | HEIGHT: 69 IN | HEART RATE: 85 BPM | DIASTOLIC BLOOD PRESSURE: 64 MMHG | WEIGHT: 191 LBS | TEMPERATURE: 98.6 F | SYSTOLIC BLOOD PRESSURE: 114 MMHG | OXYGEN SATURATION: 96 %

## 2019-07-15 DIAGNOSIS — J00 ACUTE NASOPHARYNGITIS: ICD-10-CM

## 2019-07-15 DIAGNOSIS — R05.9 COUGH: ICD-10-CM

## 2019-07-15 PROCEDURE — 99214 OFFICE O/P EST MOD 30 MIN: CPT | Performed by: INTERNAL MEDICINE

## 2019-07-15 RX ORDER — DOXYCYCLINE HYCLATE 100 MG
100 TABLET ORAL 2 TIMES DAILY
Qty: 14 TAB | Refills: 0 | Status: SHIPPED | OUTPATIENT
Start: 2019-07-15 | End: 2021-03-30

## 2019-07-15 RX ORDER — FLUTICASONE PROPIONATE 50 MCG
2 SPRAY, SUSPENSION (ML) NASAL DAILY
Qty: 16 G | Refills: 3 | Status: SHIPPED | OUTPATIENT
Start: 2019-07-15 | End: 2020-07-22

## 2019-07-15 ASSESSMENT — PATIENT HEALTH QUESTIONNAIRE - PHQ9
CLINICAL INTERPRETATION OF PHQ2 SCORE: 6
5. POOR APPETITE OR OVEREATING: 0 - NOT AT ALL
SUM OF ALL RESPONSES TO PHQ QUESTIONS 1-9: 12

## 2019-07-15 ASSESSMENT — PAIN SCALES - GENERAL: PAINLEVEL: NO PAIN

## 2019-07-15 ASSESSMENT — ENCOUNTER SYMPTOMS: GENERAL WELL-BEING: GOOD

## 2019-07-15 ASSESSMENT — ACTIVITIES OF DAILY LIVING (ADL): BATHING_REQUIRES_ASSISTANCE: 0

## 2019-07-15 NOTE — PROGRESS NOTES
Subjective:   Alfonso Gaspar is a 74 y.o. male here today for evaluation and management of:    This is a patient of Dr. Lance Alvarez.     Acute nasopharyngitis  Cough  He presents today for evaluation of two weeks of acute upper respiratory symptoms. He complains of sinus pressure, ear fullness, rhinorrhea, nasal congestion, post nasal drip and cough. No significant improvement with DayQuil. He is now taking Tylenol in the evening to alleviate his insomnia with some relief. Negative for fevers, chills, ear pain, chest pain or shortness of breath. No recent ill contact. No prior history of lung disease, asthma or smoking. He was a former smoker but quit in 1975.  He reports that he feels more nasal congestion and postnasal drip at night.      Current medicines (including changes today)  Current Outpatient Prescriptions   Medication Sig Dispense Refill   • doxycycline (VIBRAMYCIN) 100 MG Tab Take 1 Tab by mouth 2 times a day. 14 Tab 0   • fluticasone (FLONASE) 50 MCG/ACT nasal spray Spray 2 Sprays in nose every day. 16 g 3   • allopurinol (ZYLOPRIM) 300 MG Tab TAKE 1 TABLET BY MOUTH EVERY DAY 90 Tab 0   • enalapril (VASOTEC) 10 MG Tab Take 1 Tab by mouth every day. 90 Tab 0   • Calcium Carbonate-Vitamin D (CALCIUM-D) 600-400 MG-UNIT Tab Take 2 Tabs by mouth every day.     • Multiple Vitamins-Minerals (MENS 50+ MULTI VITAMIN/MIN) Tab Take 1 Tab by mouth every day.     • Misc Natural Products (GLUCOSAMINE CHOND COMPLEX/MSM) Tab Take 2 Tabs by mouth every day.     • Omega-3 Fatty Acids (FISH OIL) 1000 MG Cap capsule Take 1,000 mg by mouth every day.       No current facility-administered medications for this visit.      He  has a past medical history of Gout; Gout; and Hypertension.    ROS:  Negative for fevers, chills, ear pain, chest pain, shortness of breath or abdominal pain. See HPI for further details.       Objective:     /64 (BP Location: Right arm, Patient Position: Sitting, BP Cuff Size:  "Adult)   Pulse 85   Temp 37 °C (98.6 °F) (Temporal)   Ht 1.753 m (5' 9\")   Wt 86.6 kg (191 lb)   SpO2 96%  Body mass index is 28.21 kg/m².     Physical Exam:  General: Alert, oriented and no acute distress.  Eye contact is good, speech goal directed, affect calm  HEENT: conjunctiva non-injected, sclera non-icteric.  Oral mucous membranes pink and moist with no lesions.  Pinna normal. TM pearly gray.   Neck No supraclavicular, submandibular, submental lymphadenopathy or masses in the neck or supraclavicular regions.  Lungs: Normal respiratory effort, clear to auscultation bilaterally with good excursion.  CV: regular rate and rhythm. No murmurs.  Abdomen: soft, non distended, nontender, Bowel sound normal.  Ext: no edema, color normal, vascularity normal, temperature normal      Assessment and Plan:   The following treatment plan was discussed:    1. Acute nasopharyngitis  2. Cough  Acute URI symptoms for the past two weeks. No evidence of otitis media on exam. He will be prescribed Doxycycline and Flonase nasal spray.   - Recommended he complete steam treatments and nasal irrigation with normal saline twice daily. Use the prescribed Flonase nasal spray after. He may add Vicks to the water when performing steam treatments.   - Complete warm salt water gargles three times a day. Advised he increase his clear water intake. Recommended he drink warm fluids.   - To treat the congestion and cough, he may start Robitussin over the counter.  - Prescribed an antibiotic which may cause loose stools. He may start a probiotic or eat yogurt.  I discussed all the potential side effects of doxycycline with patient.  - Discussed ED precautions with patient: seek emergency evaluation for symptoms including but not limited to: worsening symptoms or developing any new symptoms, crushing chest pain, chest pain associated with difficulty breathing, nausea, or sweats, heart rate irregular or too fast to count.   - Any change or " worsening of signs or symptoms, patient encouraged to follow-up or report to emergency room for further evaluation. Patient understands and agrees  - doxycycline (VIBRAMYCIN) 100 MG Tab; Take 1 Tab by mouth 2 times a day.  Dispense: 14 Tab; Refill: 0  - fluticasone (FLONASE) 50 MCG/ACT nasal spray; Spray 2 Sprays in nose every day.  Dispense: 16 g; Refill: 3      Follow up: Return if symptoms worsen or fail to improve.  Patient has follow-up appointment with Dr. Greco, PCP on 10/1/2019.  Patient is advised to follow-up with his PCP to discuss cholesterol treatment and blood test result.      Please note that this dictation was created using voice recognition software. I have made every reasonable attempt to correct obvious errors, but I expect that there may have unintended errors in text, spelling, punctuation, or grammar that I did not discover.    I, Barb Cerda (Scribe), am scribing for, and in the presence of, Korin Damon M.D.    Electronically signed by: Barb Cerda (Yessenia), 7/15/2019    IKorin M.D., personally performed the services described in this documentation, as scribed by Barb Cerda in my presence, and it is both accurate and complete.

## 2019-08-11 DIAGNOSIS — I10 ESSENTIAL HYPERTENSION: ICD-10-CM

## 2019-08-11 DIAGNOSIS — M1A.09X0 CHRONIC GOUT OF MULTIPLE SITES, UNSPECIFIED CAUSE: ICD-10-CM

## 2019-08-13 RX ORDER — ALLOPURINOL 300 MG/1
TABLET ORAL
Qty: 100 TAB | Refills: 2 | Status: SHIPPED | OUTPATIENT
Start: 2019-08-13 | End: 2020-07-01

## 2019-08-13 RX ORDER — ENALAPRIL MALEATE 10 MG/1
TABLET ORAL
Qty: 100 TAB | Refills: 2 | Status: SHIPPED | OUTPATIENT
Start: 2019-08-13 | End: 2020-07-01

## 2019-10-01 ENCOUNTER — OFFICE VISIT (OUTPATIENT)
Dept: MEDICAL GROUP | Age: 74
End: 2019-10-01
Payer: COMMERCIAL

## 2019-10-01 VITALS
SYSTOLIC BLOOD PRESSURE: 132 MMHG | OXYGEN SATURATION: 96 % | DIASTOLIC BLOOD PRESSURE: 72 MMHG | WEIGHT: 195.2 LBS | TEMPERATURE: 97.5 F | BODY MASS INDEX: 28.91 KG/M2 | HEART RATE: 102 BPM | HEIGHT: 69 IN

## 2019-10-01 DIAGNOSIS — F51.01 PRIMARY INSOMNIA: ICD-10-CM

## 2019-10-01 DIAGNOSIS — M54.2 NECK PAIN: ICD-10-CM

## 2019-10-01 DIAGNOSIS — I10 ESSENTIAL HYPERTENSION, BENIGN: ICD-10-CM

## 2019-10-01 DIAGNOSIS — Z23 NEED FOR VACCINATION: Primary | ICD-10-CM

## 2019-10-01 PROCEDURE — G0008 ADMIN INFLUENZA VIRUS VAC: HCPCS | Performed by: FAMILY MEDICINE

## 2019-10-01 PROCEDURE — 99214 OFFICE O/P EST MOD 30 MIN: CPT | Mod: 25 | Performed by: FAMILY MEDICINE

## 2019-10-01 PROCEDURE — 90662 IIV NO PRSV INCREASED AG IM: CPT | Performed by: FAMILY MEDICINE

## 2019-10-02 NOTE — PROGRESS NOTES
This medical record contains text that has been entered with the assistance of computer voice recognition and dictation software.  Therefore, it may contain unintended errors in text, spelling, punctuation, or grammar    Chief Complaint   Patient presents with   • Follow-Up     6 month follow up        HPI:     Alfonso Gaspar is a 74 y.o. male here evaluation and management of:      Need for vaccination    Patient is due for Shingrix and influenza vaccines. No prior history of allergic reactions following administration of a vaccine.    Neck pain  Chronic history.    Patient complains of chronic neck pain for the past two years. He denies any trauma or injury prior to the onset of his symptoms. His pain is well controlled with Tylenol taken as needed. He denies any headaches or upper extremity weakness, numbness or tingling.     Essential hypertension, benign    Patient has a history of chronic hypertension and is taking Vasotec 10 mg once daily. No medication side effects were reported. He reportedly monitors his blood pressure regularly at home. Blood pressure is 132/72 today. He reports following a low sodium diet and denies any related complaints including chronic cough, chest pain, headaches or dizziness.      Primary insomnia  Chronic history     Patient reports chronic insomnia stating he will often wake up at 3:00 AM and is unable to fall back asleep. He will regularly take Tylenol PM at 7:00 PM which helps him to fall asleep. Previously, he was taking Trazodone which he did not tolerate well.        Current medicines (including changes today)  Current Outpatient Medications   Medication Sig Dispense Refill   • enalapril (VASOTEC) 10 MG Tab TAKE 1 TABLET BY MOUTH EVERY DAY. NEEDS LABS 100 Tab 2   • allopurinol (ZYLOPRIM) 300 MG Tab TAKE 1 TABLET BY MOUTH EVERY DAY. NEED LAB WORK 100 Tab 2   • doxycycline (VIBRAMYCIN) 100 MG Tab Take 1 Tab by mouth 2 times a day. 14 Tab 0   • Calcium Carbonate-Vitamin D  (CALCIUM-D) 600-400 MG-UNIT Tab Take 2 Tabs by mouth every day.     • Multiple Vitamins-Minerals (MENS 50+ MULTI VITAMIN/MIN) Tab Take 1 Tab by mouth every day.     • Misc Natural Products (GLUCOSAMINE CHOND COMPLEX/MSM) Tab Take 2 Tabs by mouth every day.     • Omega-3 Fatty Acids (FISH OIL) 1000 MG Cap capsule Take 1,000 mg by mouth every day.     • fluticasone (FLONASE) 50 MCG/ACT nasal spray Spray 2 Sprays in nose every day. (Patient not taking: Reported on 10/1/2019) 16 g 3     No current facility-administered medications for this visit.      He  has a past medical history of Gout, Gout, and Hypertension.  He  has a past surgical history that includes pr removal spleen, total and pr removal of tonsils,<10 y/o.  Social History     Tobacco Use   • Smoking status: Former Smoker     Packs/day: 0.25     Years: 10.00     Pack years: 2.50     Types: Cigarettes     Last attempt to quit: 1975     Years since quittin.8   • Smokeless tobacco: Never Used   Substance Use Topics   • Alcohol use: Yes     Alcohol/week: 4.2 oz     Types: 2 Glasses of wine, 5 Cans of beer per week     Comment: occ   • Drug use: No     Social History     Social History Narrative   • Not on file     Family History   Problem Relation Age of Onset   • Diabetes Father    • No Known Problems Maternal Grandmother    • Cancer Maternal Grandfather         Lung cancer   • No Known Problems Paternal Grandmother    • No Known Problems Paternal Grandfather    • Other Sister         polio     Family Status   Relation Name Status   • Fa   at age 74        heart attack   • Mo     • Sis  Alive   • MGMo     • MGFa     • PGMo     • PGFa     • Sis  Alive       ROS    Please see HPI for further details.    All other systems reviewed and are negative.     Objective:     /72 (BP Location: Left arm, Patient Position: Sitting, BP Cuff Size: Adult)   Pulse (!) 102   Temp 36.4 °C (97.5 °F) (Temporal)   Ht  "1.753 m (5' 9\")   Wt 88.5 kg (195 lb 3.2 oz)   SpO2 96%  Body mass index is 28.83 kg/m².     Physical Exam:    Constitutional: Alert, no distress.  Skin: Warm, dry, good turgor, no rashes in visible areas.  Eye: Equal, round and reactive, conjunctiva clear, lids normal.  ENMT: Lips without lesions, good dentition, oropharynx clear.  Neck: Trachea midline, no masses, no thyromegaly. No cervical or supraclavicular lymphadenopathy.  Respiratory: Unlabored respiratory effort, lungs clear to auscultation, no wheezes, no ronchi.  Cardiovascular: Normal S1, S2, no murmur, no edema.  Psych: Alert and oriented x3, normal affect and mood.      Assessment and Plan:   The following treatment plan was discussed:    1. Need for vaccination    Influenza vaccine was administered in the clinic today after risks and benefits were discussed. He may obtain his Shingrix vaccine at his local pharmacy and was given a prescription.    - Influenza Vaccine, High Dose (65+ Only)  - Zoster Vac Recomb Adjuvanted (SHINGRIX) 50 MCG/0.5ML Recon Susp; 0.5 mL by Intramuscular route Once for 1 dose.  Dispense: 0.5 mL; Refill: 0    2. Neck pain    Chronic history. Patient was encouraged to take Tylenol for pain. He may complete gentle stretching exercises and heat therapy to alleviate his symptoms.    3. Essential hypertension, benign    Chronic, stable history. Under good control with current medication regimen: Enalapril 10 mg once daily. No changes in dosage today. Blood pressure today was 132/72. Patient was asked to continue monitoring his blood pressure at home. He was encouraged to follow a low sodium diet.     4. Primary insomnia    Chronic history. Patient may continue to take Tylenol PM and/or may also take Melatonin supplements. Sleep hygiene was discussed with the patient and includes limiting his screen time prior to bed. He does not wish to restart Shingrix at this time.        HEALTH MAINTENANCE: Patient is due for the Shingrix " vaccine which he may obtain at his local pharmacy.    Instructed to follow up in clinic or ER for worsening symptoms, difficulty breathing, lack of expected recovery, or should new symptoms or problems arise.    Follow up: Return in about 3 months (around 1/1/2020) for Reevaluation.      Once again this medical record contains text that has been entered with the assistance of computer voice recognition, dictation software, and medical scribes.  Therefore, it may contain unintended errors in text, spelling, punctuation or grammar.    IBarb (Scribe), am scribing for, and in the presence of, Lance Greco M.D.    Electronically signed by: Barb Cerda (Scribe), 10/1/2019     Lance TRINH M.D. personally performed the services described in this documentation, as scribed by Barb Cerda in my presence, and it is both accurate and complete.

## 2020-02-26 ENCOUNTER — HOSPITAL ENCOUNTER (OUTPATIENT)
Dept: RADIOLOGY | Facility: MEDICAL CENTER | Age: 75
End: 2020-02-26
Attending: FAMILY MEDICINE
Payer: COMMERCIAL

## 2020-02-26 ENCOUNTER — OFFICE VISIT (OUTPATIENT)
Dept: MEDICAL GROUP | Age: 75
End: 2020-02-26
Payer: COMMERCIAL

## 2020-02-26 VITALS
TEMPERATURE: 98.4 F | DIASTOLIC BLOOD PRESSURE: 60 MMHG | OXYGEN SATURATION: 96 % | HEART RATE: 74 BPM | WEIGHT: 190.4 LBS | SYSTOLIC BLOOD PRESSURE: 128 MMHG | HEIGHT: 70 IN | BODY MASS INDEX: 27.26 KG/M2

## 2020-02-26 DIAGNOSIS — M17.0 PRIMARY OSTEOARTHRITIS OF BOTH KNEES: ICD-10-CM

## 2020-02-26 DIAGNOSIS — M25.562 ACUTE PAIN OF LEFT KNEE: ICD-10-CM

## 2020-02-26 PROCEDURE — 99214 OFFICE O/P EST MOD 30 MIN: CPT | Performed by: FAMILY MEDICINE

## 2020-02-26 PROCEDURE — 73564 X-RAY EXAM KNEE 4 OR MORE: CPT | Mod: LT

## 2020-02-26 PROCEDURE — 73564 X-RAY EXAM KNEE 4 OR MORE: CPT | Mod: RT

## 2020-02-26 ASSESSMENT — PATIENT HEALTH QUESTIONNAIRE - PHQ9: CLINICAL INTERPRETATION OF PHQ2 SCORE: 0

## 2020-02-26 NOTE — PROGRESS NOTES
"This medical record contains text that has been entered with the assistance of computer voice recognition and dictation software.  Therefore, it may contain unintended errors in text, spelling, punctuation, or grammar    Chief Complaint   Patient presents with   • Knee Pain     both knees mainly left         Alfonso Gaspar is a 74 y.o. male with history of neck pain here for the evaluation and management of: bilateral knee pain.       HPI:      1. Acute pain of left knee  NEW PROBLEM    Patient reports history of chronic bilateral knee pain with acute exacerbation. He reports 2 days ago he woke up and had difficulty bearing weight on his LLE due to severe knee pain.  He describes the pain is very sharp, 6 out of 10, worse at the end of the day improves with rest.  He denies that the pain radiates anywhere there are no other associated features.  No acute trauma or injury reported. He's been using tylenol and alleve with moderate relief. Today he reports the acute left knee pain has greatly improved, however is still present. It seems that symptoms are worsened by \"the more he walks, the more it hurts\". He denies any weakness, numbness, tingling.  He denies any instability, no popping locking.  He denies any fevers or chills.    Current medicines (including changes today)  Current Outpatient Medications   Medication Sig Dispense Refill   • Diclofenac Sodium (VOLTAREN) 1 % Gel Apply 2 grams up to 3 times daily for 7 days 1 Tube 1   • enalapril (VASOTEC) 10 MG Tab TAKE 1 TABLET BY MOUTH EVERY DAY. NEEDS LABS 100 Tab 2   • allopurinol (ZYLOPRIM) 300 MG Tab TAKE 1 TABLET BY MOUTH EVERY DAY. NEED LAB WORK 100 Tab 2   • Calcium Carbonate-Vitamin D (CALCIUM-D) 600-400 MG-UNIT Tab Take 2 Tabs by mouth every day.     • Multiple Vitamins-Minerals (MENS 50+ MULTI VITAMIN/MIN) Tab Take 1 Tab by mouth every day.     • Misc Natural Products (GLUCOSAMINE CHOND COMPLEX/MSM) Tab Take 2 Tabs by mouth every day.     • Omega-3 Fatty " "Acids (FISH OIL) 1000 MG Cap capsule Take 1,000 mg by mouth every day.     • doxycycline (VIBRAMYCIN) 100 MG Tab Take 1 Tab by mouth 2 times a day. (Patient not taking: Reported on 2020) 14 Tab 0   • fluticasone (FLONASE) 50 MCG/ACT nasal spray Spray 2 Sprays in nose every day. (Patient not taking: Reported on 10/1/2019) 16 g 3     No current facility-administered medications for this visit.      He  has a past medical history of Gout, Gout, and Hypertension.  He  has a past surgical history that includes pr removal spleen, total and pr removal of tonsils,<12 y/o.  Social History     Tobacco Use   • Smoking status: Former Smoker     Packs/day: 0.25     Years: 10.00     Pack years: 2.50     Types: Cigarettes     Last attempt to quit: 1975     Years since quittin.2   • Smokeless tobacco: Never Used   Substance Use Topics   • Alcohol use: Yes     Alcohol/week: 4.2 oz     Types: 2 Glasses of wine, 5 Cans of beer per week     Comment: occ   • Drug use: No     Social History     Social History Narrative   • Not on file     Family History   Problem Relation Age of Onset   • Diabetes Father    • No Known Problems Maternal Grandmother    • Cancer Maternal Grandfather         Lung cancer   • No Known Problems Paternal Grandmother    • No Known Problems Paternal Grandfather    • Other Sister         polio     Family Status   Relation Name Status   • Fa   at age 74        heart attack   • Mo     • Sis  Alive   • MGMo     • MGFa     • PGMo     • PGFa     • Sis  Alive         ROS    Please see hpi     All other systems reviewed and are negative     Objective:     /60 (BP Location: Left arm, Patient Position: Sitting, BP Cuff Size: Adult)   Pulse 74   Temp 36.9 °C (98.4 °F) (Temporal)   Ht 1.778 m (5' 10\")   Wt 86.4 kg (190 lb 6.4 oz)   SpO2 96%  Body mass index is 27.32 kg/m².     Physical Exam:    Constitutional: Alert, no distress.  Skin: Warm, dry, no " visible rashes.  Eye: Equal, round and reactive, conjunctiva clear, lids normal.  ENMT: Lips without lesions, good dentition, oropharynx clear.  Neck: Trachea midline, no masses, no thyromegaly. No cervical or supraclavicular lymphadenopathy.  Respiratory: Unlabored respiratory effort, lungs clear to auscultation, no wheezes, no rhonchi.  Cardiovascular: Normal S1, S2, no murmur, no edema.  Abdomen: Soft, non-tender, no masses, no hepatosplenomegaly.  Psych: Alert and oriented x3, normal affect and mood.    Left Knee - negative anterior drawer, negative posterior drawer, negative lachmann,  no joint line tenderness, negative Thessalay test, normal gait, no asymmetry of quadriceps,   negative valgus and varus stress,  negative Gina, Negative Pivot, negative Johanny test      Assessment and Plan:   The following treatment plan was discussed:    1. Acute pain of left knee    - Known history of chronic bilateral knee pain and today patient reports recent onset of acute left knee pain without trauma or injury.   - Benign knee exam in office today, see above.   - Imaging ordered as below, I will contact once resulted.  - Patient advised on use of tylenol, rest, and ice. I've additionally initiated him on Voltaren gel as outlined below. I reviewed potential risks, benefits, and side effects of this medication with the patient in clinic today.  Based on his age and exam he will likely need Synvisc injections or total knee replacement.    - DX-KNEE COMPLETE 4+ LEFT; Future  - DX-KNEE COMPLETE 4+ RIGHT; Future    - Diclofenac Sodium (VOLTAREN) 1 % Gel; Apply 2 grams up to 3 times daily for 7 days  Dispense: 1 Tube; Refill: 1    HEALTH MAINTENANCE: Due for MCV4 vaccine, meningococcal B vaccines, Tdap vaccine, shingles vaccine, AWV.    Instructed to Follow up in clinic or ER for worsening symptoms, difficulty breathing, lack of expected recovery, or should new symptoms or problems arise.    Follow-up: Return in about 3 months  (around 5/26/2020) for Reevaluation.      Once again this medical record contains text that has been entered with the assistance of computer voice recognition, dictation software, and medical scribes.  Therefore, it may contain unintended errors in text, spelling, punctuation, or grammar.    ISabina (Scribe), am scribing for, and in the presence of, Lance Greco M.D.    Electronically signed by: Sabina Brasher), 2/26/2020     ILance M.D. personally performed the services described in this documentation, as scribed by Sabina Dee in my presence, and it is both accurate and complete.

## 2020-06-16 ENCOUNTER — OFFICE VISIT (OUTPATIENT)
Dept: MEDICAL GROUP | Age: 75
End: 2020-06-16
Payer: COMMERCIAL

## 2020-06-16 VITALS
TEMPERATURE: 97.7 F | HEART RATE: 93 BPM | BODY MASS INDEX: 27.2 KG/M2 | WEIGHT: 190 LBS | OXYGEN SATURATION: 96 % | SYSTOLIC BLOOD PRESSURE: 130 MMHG | HEIGHT: 70 IN | DIASTOLIC BLOOD PRESSURE: 60 MMHG

## 2020-06-16 DIAGNOSIS — Z01.818 PREOPERATIVE CLEARANCE: ICD-10-CM

## 2020-06-16 PROCEDURE — 99213 OFFICE O/P EST LOW 20 MIN: CPT | Performed by: FAMILY MEDICINE

## 2020-06-16 ASSESSMENT — FIBROSIS 4 INDEX: FIB4 SCORE: 2.22

## 2020-06-16 NOTE — PROGRESS NOTES
This medical record contains text that has been entered with the assistance of computer voice recognition and dictation software.  Therefore, it may contain unintended errors in text, spelling, punctuation, or grammar      Chief Complaint   Patient presents with   • Annual Wellness Visit   • Knee Swelling     pt is having knee replacment          Alfonso Gaspar is a 75 y.o. male here evaluation and management of: Preoperative evaluation      HPI:     1. Preoperative clearance  NEW PROBLEM    The patient is a 75-year-old male who presents to clinic for preoperative clearance.  He will be undergoing total left knee replacement.  Does not have a history of stroke no history of uncontrolled diabetes no history of renal insufficiency no history of valve pathologies.  He denies any fevers, no night sweats, no chills, no chest pain or shortness of breath no dyspnea exertion.  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        Current medicines (including changes today)  Current Outpatient Medications   Medication Sig Dispense Refill   • enalapril (VASOTEC) 10 MG Tab TAKE 1 TABLET BY MOUTH EVERY DAY. NEEDS LABS 100 Tab 2   • allopurinol (ZYLOPRIM) 300 MG Tab TAKE 1 TABLET BY MOUTH EVERY DAY. NEED LAB WORK 100 Tab 2   • Calcium Carbonate-Vitamin D (CALCIUM-D) 600-400 MG-UNIT Tab Take 2 Tabs by mouth every day.     • Multiple Vitamins-Minerals (MENS 50+ MULTI VITAMIN/MIN) Tab Take 1 Tab by mouth every day.     • Misc Natural Products (GLUCOSAMINE CHOND COMPLEX/MSM) Tab Take 2 Tabs by mouth every day.     • Omega-3 Fatty Acids (FISH OIL) 1000 MG Cap capsule Take 1,000 mg by mouth every day.     • Diclofenac Sodium (VOLTAREN) 1 % Gel Apply 2 grams up to 3 times daily for 7 days (Patient not taking: Reported on 6/16/2020) 1 Tube 1   • doxycycline (VIBRAMYCIN) 100 MG Tab Take 1 Tab by mouth 2 times a day. (Patient not  "taking: Reported on 2020) 14 Tab 0   • fluticasone (FLONASE) 50 MCG/ACT nasal spray Spray 2 Sprays in nose every day. (Patient not taking: Reported on 10/1/2019) 16 g 3     No current facility-administered medications for this visit.      He  has a past medical history of Gout, Gout, and Hypertension.  He  has a past surgical history that includes pr removal spleen, total and pr removal of tonsils,<12 y/o.  Social History     Tobacco Use   • Smoking status: Former Smoker     Packs/day: 0.25     Years: 10.00     Pack years: 2.50     Types: Cigarettes     Last attempt to quit: 1975     Years since quittin.5   • Smokeless tobacco: Never Used   Substance Use Topics   • Alcohol use: Yes     Alcohol/week: 4.2 oz     Types: 2 Glasses of wine, 5 Cans of beer per week     Comment: occ   • Drug use: No     Social History     Social History Narrative   • Not on file     Family History   Problem Relation Age of Onset   • Diabetes Father    • No Known Problems Maternal Grandmother    • Cancer Maternal Grandfather         Lung cancer   • No Known Problems Paternal Grandmother    • No Known Problems Paternal Grandfather    • Other Sister         polio     Family Status   Relation Name Status   • Fa   at age 74        heart attack   • Mo     • Sis  Alive   • MGMo     • MGFa     • PGMo     • PGFa     • Sis  Alive         ROS    The pertinent  ROS findings can be seen in the HPI above.     All other systems reviewed and are negative     Objective:     /60 (BP Location: Left arm, Patient Position: Sitting, BP Cuff Size: Adult)   Pulse 93   Temp 36.5 °C (97.7 °F) (Temporal)   Ht 1.778 m (5' 10\")   Wt 86.2 kg (190 lb)   SpO2 96%  Body mass index is 27.26 kg/m².      Physical Exam:    Constitutional: Alert, no distress.  Skin: no rashes  Eye: Equal, round and reactive, conjunctiva clear, lids normal.  ENMT: Lips without lesions, good dentition, oropharynx " clear.  Neck: Trachea midline, no masses, no thyromegaly. No cervical or supraclavicular lymphadenopathy.  Respiratory: Unlabored respiratory effort, lungs clear to auscultation, no wheezes, no ronchi.  Cardiovascular: Normal S1, S2, no murmur, no edema  Abdomen: Soft, non-tender, no masses, no hepatosplenomegaly.  Knee-left knee reveals some mild swelling due to effusion, not warm not red nontender to touch      Assessment and Plan:   The following treatment plan was discussed      1. Preoperative clearance    The patient does not have a     history of congestive heart failure  no history of coronary artery disease  no h/o of CVD  no history of renal insufficiency  no history of insulin-dependent diabetes,    The patients' estimated risk of adverse outcome with this noncardiac surgery is very low     He is cleared for  TKR      Instructed to Follow up in clinic or ER for worsening symptoms, difficulty breathing, lack of expected recovery, or should new symptoms or problems arise.    Followup: Return in about 3 months (around 9/16/2020) for Reevaluation, labs.       Once again this medical record contains text that has been entered with the assistance of computer voice recognition and dictation software.  Therefore, it may contain unintended errors in text, spelling, punctuation, or grammar

## 2020-06-30 DIAGNOSIS — M1A.09X0 CHRONIC GOUT OF MULTIPLE SITES, UNSPECIFIED CAUSE: ICD-10-CM

## 2020-06-30 DIAGNOSIS — I10 ESSENTIAL HYPERTENSION: ICD-10-CM

## 2020-07-01 RX ORDER — ALLOPURINOL 300 MG/1
TABLET ORAL
Qty: 90 TAB | Refills: 3 | Status: SHIPPED | OUTPATIENT
Start: 2020-07-01 | End: 2021-06-21

## 2020-07-01 RX ORDER — ENALAPRIL MALEATE 10 MG/1
TABLET ORAL
Qty: 90 TAB | Refills: 3 | Status: SHIPPED | OUTPATIENT
Start: 2020-07-01

## 2020-07-21 DIAGNOSIS — J00 ACUTE NASOPHARYNGITIS: ICD-10-CM

## 2020-07-22 RX ORDER — FLUTICASONE PROPIONATE 50 MCG
2 SPRAY, SUSPENSION (ML) NASAL DAILY
Qty: 48 ML | Refills: 1 | Status: SHIPPED | OUTPATIENT
Start: 2020-07-22 | End: 2021-03-30

## 2020-08-19 ENCOUNTER — OFFICE VISIT (OUTPATIENT)
Dept: MEDICAL GROUP | Age: 75
End: 2020-08-19
Payer: COMMERCIAL

## 2020-08-19 VITALS
SYSTOLIC BLOOD PRESSURE: 114 MMHG | TEMPERATURE: 96.8 F | WEIGHT: 185.6 LBS | DIASTOLIC BLOOD PRESSURE: 66 MMHG | HEIGHT: 70 IN | OXYGEN SATURATION: 97 % | BODY MASS INDEX: 26.57 KG/M2 | HEART RATE: 107 BPM

## 2020-08-19 DIAGNOSIS — M10.062 ACUTE IDIOPATHIC GOUT OF LEFT KNEE: ICD-10-CM

## 2020-08-19 PROCEDURE — 99214 OFFICE O/P EST MOD 30 MIN: CPT | Performed by: FAMILY MEDICINE

## 2020-08-19 RX ORDER — OXYCODONE AND ACETAMINOPHEN 10; 325 MG/1; MG/1
TABLET ORAL
COMMUNITY
Start: 2020-06-29 | End: 2021-03-30

## 2020-08-19 RX ORDER — HYDROCODONE BITARTRATE AND ACETAMINOPHEN 7.5; 325 MG/1; MG/1
1 TABLET ORAL 2 TIMES DAILY
Qty: 30 TAB | Refills: 0 | Status: SHIPPED | OUTPATIENT
Start: 2020-08-19 | End: 2020-09-03

## 2020-08-19 RX ORDER — ATORVASTATIN CALCIUM 40 MG/1
40 TABLET, FILM COATED ORAL
COMMUNITY
Start: 2020-07-16

## 2020-08-19 RX ORDER — HYDROCODONE BITARTRATE AND ACETAMINOPHEN 7.5; 325 MG/1; MG/1
1 TABLET ORAL EVERY 6 HOURS PRN
COMMUNITY
End: 2020-08-19 | Stop reason: SDUPTHER

## 2020-08-19 ASSESSMENT — FIBROSIS 4 INDEX: FIB4 SCORE: 2.22

## 2020-08-19 NOTE — PROGRESS NOTES
This medical record contains text that has been entered with the assistance of computer voice recognition and dictation software.  Therefore, it may contain unintended errors in text, spelling, punctuation, or grammar      Chief Complaint   Patient presents with   • Gout     both knees          Alfonso Gaspar is a 75 y.o. male here evaluation and management of: Left knee pain      HPI:     1. Acute idiopathic gout of left knee  NEW PROBLEM/new exacerbation    Patient is a 75-year-old male who presents to clinic with chief complaint of left knee pain.  He states that this feels like a gout attack however he cannot take any ibuprofen because he is scheduled for total knee replacement next week.  He wants something to take the edge off the pain.  He states that he usually does not drink but last weekend his children came up to see him and he had couple glasses of wine so that is the only new change in his diet/drinking.  He denies any fever no chills no redness in the affected joint.  He denies any shortness of breath or chest pain.    Current medicines (including changes today)  Current Outpatient Medications   Medication Sig Dispense Refill   • atorvastatin (LIPITOR) 40 MG Tab Take 40 mg by mouth every day.     • HYDROcodone-acetaminophen (NORCO) 7.5-325 MG per tablet Take 1 Tab by mouth 2 times a day for 15 days. 30 Tab 0   • enalapril (VASOTEC) 10 MG Tab TAKE 1 TABLET BY MOUTH EVERY DAY. NEEDS LABS 90 Tab 3   • allopurinol (ZYLOPRIM) 300 MG Tab TAKE 1 TABLET BY MOUTH EVERY DAY. NEED LAB WORK 90 Tab 3   • oxyCODONE-acetaminophen (PERCOCET-10)  MG Tab TAKE 1/2 TO 1 TABLET ORALLY EVERY 4 TO 6 HOURS AS NEEDED FOR PAIN M17.9 7DS     • fluticasone (FLONASE) 50 MCG/ACT nasal spray SPRAY 2 SPRAYS IN NOSE EVERY DAY. (Patient not taking: Reported on 8/19/2020) 48 mL 1   • Diclofenac Sodium (VOLTAREN) 1 % Gel Apply 2 grams up to 3 times daily for 7 days (Patient not taking: Reported on 6/16/2020) 1 Tube 1   •  doxycycline (VIBRAMYCIN) 100 MG Tab Take 1 Tab by mouth 2 times a day. (Patient not taking: Reported on 2020) 14 Tab 0   • Calcium Carbonate-Vitamin D (CALCIUM-D) 600-400 MG-UNIT Tab Take 2 Tabs by mouth every day.     • Multiple Vitamins-Minerals (MENS 50+ MULTI VITAMIN/MIN) Tab Take 1 Tab by mouth every day.     • Misc Natural Products (GLUCOSAMINE CHOND COMPLEX/MSM) Tab Take 2 Tabs by mouth every day.     • Omega-3 Fatty Acids (FISH OIL) 1000 MG Cap capsule Take 1,000 mg by mouth every day.       No current facility-administered medications for this visit.      He  has a past medical history of Gout, Gout, and Hypertension.  He  has a past surgical history that includes pr removal spleen, total and pr removal of tonsils,<10 y/o.  Social History     Tobacco Use   • Smoking status: Former Smoker     Packs/day: 0.25     Years: 10.00     Pack years: 2.50     Types: Cigarettes     Quit date: 1975     Years since quittin.7   • Smokeless tobacco: Never Used   Substance Use Topics   • Alcohol use: Yes     Alcohol/week: 4.2 oz     Types: 2 Glasses of wine, 5 Cans of beer per week     Comment: occ   • Drug use: No     Social History     Social History Narrative   • Not on file     Family History   Problem Relation Age of Onset   • Diabetes Father    • No Known Problems Maternal Grandmother    • Cancer Maternal Grandfather         Lung cancer   • No Known Problems Paternal Grandmother    • No Known Problems Paternal Grandfather    • Other Sister         polio     Family Status   Relation Name Status   • Fa   at age 74        heart attack   • Mo     • Sis  Alive   • MGMo     • MGFa     • PGMo     • PGFa     • Sis  Alive         ROS    The pertinent  ROS findings can be seen in the HPI above.     All other systems reviewed and are negative     Objective:     /66 (BP Location: Right arm, Patient Position: Sitting, BP Cuff Size: Adult)   Pulse (!) 107   Temp  "36 °C (96.8 °F) (Temporal)   Ht 1.778 m (5' 10\")   Wt 84.2 kg (185 lb 9.6 oz)   SpO2 97%  Body mass index is 26.63 kg/m².      Physical Exam:    Constitutional: Alert, no distress.  Skin: no rashes  Eye: Equal, round and reactive, conjunctiva clear, lids normal.  ENMT: Lips without lesions, good dentition, oropharynx clear.  Neck: Trachea midline, no masses, no thyromegaly. No cervical or supraclavicular lymphadenopathy.  Respiratory: Unlabored respiratory effort, lungs clear to auscultation, no wheezes, no ronchi.  Cardiovascular: Normal S1, S2, no murmur, no edema  Abdomen: Soft, non-tender, no masses, no hepatosplenomegaly.  Right Knee--left knee is slightly more swollen than right tender to touch, fluid wave noted, not warm not red normal flexion extension      Assessment and Plan:   The following treatment plan was discussed      1. Acute idiopathic gout of left knee    I want to avoid NSAIDs because he is can have surgery next week.  We will give him Norco  Side effects explained.    - HYDROcodone-acetaminophen (NORCO) 7.5-325 MG per tablet; Take 1 Tab by mouth 2 times a day for 15 days.  Dispense: 30 Tab; Refill: 0          Instructed to Follow up in clinic or ER for worsening symptoms, difficulty breathing, lack of expected recovery, or should new symptoms or problems arise.    Followup: Return in about 3 months (around 11/19/2020) for Reevaluation.       Once again this medical record contains text that has been entered with the assistance of computer voice recognition and dictation software.  Therefore, it may contain unintended errors in text, spelling, punctuation, or grammar         "

## 2020-11-17 ENCOUNTER — APPOINTMENT (RX ONLY)
Dept: URBAN - METROPOLITAN AREA CLINIC 4 | Facility: CLINIC | Age: 75
Setting detail: DERMATOLOGY
End: 2020-11-17

## 2020-11-17 DIAGNOSIS — L81.4 OTHER MELANIN HYPERPIGMENTATION: ICD-10-CM

## 2020-11-17 DIAGNOSIS — Z71.89 OTHER SPECIFIED COUNSELING: ICD-10-CM

## 2020-11-17 DIAGNOSIS — D18.0 HEMANGIOMA: ICD-10-CM

## 2020-11-17 DIAGNOSIS — L82.1 OTHER SEBORRHEIC KERATOSIS: ICD-10-CM

## 2020-11-17 DIAGNOSIS — I87.2 VENOUS INSUFFICIENCY (CHRONIC) (PERIPHERAL): ICD-10-CM

## 2020-11-17 DIAGNOSIS — D22 MELANOCYTIC NEVI: ICD-10-CM

## 2020-11-17 DIAGNOSIS — L57.0 ACTINIC KERATOSIS: ICD-10-CM

## 2020-11-17 PROBLEM — D22.9 MELANOCYTIC NEVI, UNSPECIFIED: Status: ACTIVE | Noted: 2020-11-17

## 2020-11-17 PROBLEM — D18.01 HEMANGIOMA OF SKIN AND SUBCUTANEOUS TISSUE: Status: ACTIVE | Noted: 2020-11-17

## 2020-11-17 PROCEDURE — ? SUNSCREEN RECOMMENDATIONS

## 2020-11-17 PROCEDURE — ? LIQUID NITROGEN

## 2020-11-17 PROCEDURE — 17003 DESTRUCT PREMALG LES 2-14: CPT

## 2020-11-17 PROCEDURE — 99213 OFFICE O/P EST LOW 20 MIN: CPT | Mod: 25

## 2020-11-17 PROCEDURE — 17000 DESTRUCT PREMALG LESION: CPT

## 2020-11-17 PROCEDURE — ? COUNSELING

## 2020-11-17 ASSESSMENT — LOCATION SIMPLE DESCRIPTION DERM
LOCATION SIMPLE: NOSE
LOCATION SIMPLE: RIGHT FOREHEAD
LOCATION SIMPLE: RIGHT CHEEK
LOCATION SIMPLE: LEFT FOREHEAD
LOCATION SIMPLE: LEFT PRETIBIAL REGION
LOCATION SIMPLE: RIGHT PRETIBIAL REGION
LOCATION SIMPLE: LEFT CHEEK

## 2020-11-17 ASSESSMENT — LOCATION ZONE DERM
LOCATION ZONE: LEG
LOCATION ZONE: FACE
LOCATION ZONE: NOSE

## 2020-11-17 ASSESSMENT — LOCATION DETAILED DESCRIPTION DERM
LOCATION DETAILED: RIGHT LATERAL SUBMANDIBULAR CHEEK
LOCATION DETAILED: LEFT CENTRAL MALAR CHEEK
LOCATION DETAILED: RIGHT PROXIMAL PRETIBIAL REGION
LOCATION DETAILED: LEFT FOREHEAD
LOCATION DETAILED: NASAL DORSUM
LOCATION DETAILED: RIGHT INFERIOR FOREHEAD
LOCATION DETAILED: LEFT PROXIMAL PRETIBIAL REGION

## 2020-11-17 NOTE — PROCEDURE: LIQUID NITROGEN
Render Note In Bullet Format When Appropriate: No
Consent: The patient's consent was obtained including but not limited to risks of crusting, scabbing, blistering, scarring, darker or lighter pigmentary change, recurrence, incomplete removal and infection.
Duration Of Freeze Thaw-Cycle (Seconds): 2
Detail Level: Simple
Post-Care Instructions: I reviewed with the patient in detail post-care instructions. Patient is to wear sunprotection, and avoid picking at any of the treated lesions. Pt may apply Vaseline to crusted or scabbing areas.
Number Of Freeze-Thaw Cycles: 2 freeze-thaw cycles

## 2020-11-17 NOTE — PROCEDURE: MIPS QUALITY
Quality 431: Preventive Care And Screening: Unhealthy Alcohol Use - Screening: Patient screened for unhealthy alcohol use using a single question and scores less than 2 times per year
Detail Level: Detailed
Quality 226: Preventive Care And Screening: Tobacco Use: Screening And Cessation Intervention: Patient screened for tobacco use and is an ex/non-smoker
Quality 130: Documentation Of Current Medications In The Medical Record: Current Medications Documented
Quality 111:Pneumonia Vaccination Status For Older Adults: Pneumococcal Vaccination Previously Received

## 2021-01-11 DIAGNOSIS — Z23 NEED FOR VACCINATION: ICD-10-CM

## 2021-02-06 ENCOUNTER — IMMUNIZATION (OUTPATIENT)
Dept: FAMILY PLANNING/WOMEN'S HEALTH CLINIC | Facility: IMMUNIZATION CENTER | Age: 76
End: 2021-02-06
Attending: INTERNAL MEDICINE
Payer: COMMERCIAL

## 2021-02-06 DIAGNOSIS — Z23 NEED FOR VACCINATION: ICD-10-CM

## 2021-02-06 DIAGNOSIS — Z23 ENCOUNTER FOR VACCINATION: Primary | ICD-10-CM

## 2021-02-06 PROCEDURE — 0001A PFIZER SARS-COV-2 VACCINE: CPT | Performed by: INTERNAL MEDICINE

## 2021-02-06 PROCEDURE — 91300 PFIZER SARS-COV-2 VACCINE: CPT | Performed by: INTERNAL MEDICINE

## 2021-02-18 ENCOUNTER — OFFICE VISIT (OUTPATIENT)
Dept: MEDICAL GROUP | Age: 76
End: 2021-02-18
Payer: COMMERCIAL

## 2021-02-18 VITALS
HEART RATE: 106 BPM | DIASTOLIC BLOOD PRESSURE: 72 MMHG | SYSTOLIC BLOOD PRESSURE: 130 MMHG | RESPIRATION RATE: 16 BRPM | TEMPERATURE: 98 F | WEIGHT: 192 LBS | HEIGHT: 70 IN | OXYGEN SATURATION: 99 % | BODY MASS INDEX: 27.49 KG/M2

## 2021-02-18 DIAGNOSIS — Z09 EXAMINATION FOR, FOLLOW-UP: ICD-10-CM

## 2021-02-18 DIAGNOSIS — E87.1 HYPONATREMIA: ICD-10-CM

## 2021-02-18 DIAGNOSIS — Z96.652 S/P TKR (TOTAL KNEE REPLACEMENT), LEFT: ICD-10-CM

## 2021-02-18 PROCEDURE — 99213 OFFICE O/P EST LOW 20 MIN: CPT | Performed by: FAMILY MEDICINE

## 2021-02-18 ASSESSMENT — PATIENT HEALTH QUESTIONNAIRE - PHQ9: CLINICAL INTERPRETATION OF PHQ2 SCORE: 0

## 2021-02-18 ASSESSMENT — FIBROSIS 4 INDEX: FIB4 SCORE: 2.22

## 2021-02-19 NOTE — PROGRESS NOTES
This medical record contains text that has been entered with the assistance of computer voice recognition and dictation software.  Therefore, it may contain unintended errors in text, spelling, punctuation, or grammar      Chief Complaint   Patient presents with   • Follow-Up     knee surgery         Alfonso Gaspar is a 75 y.o. male here evaluation and management of: Hospital follow-up      HPI:     1. Examination for, follow-up  2. S/P TKR (total knee replacement), left  3. Hyponatremia    Patient is a very pleasant 75-year-old male who presents to clinic with a chief complaint of following up after hospital admission.  He was recently admitted for osteoarthritis of the left knee underwent total knee replacement.  This occurred approximately 2 weeks ago.  Then about 1 week later he began to feel extremely odd, little nauseous vomited felt feverish.  As result he went to the emergency room at [facility] where he was found to have an electrolyte abnormality consistent with hyponatremia.  His fevers, electrolyte abnormalities improved with the appropriate IV fluids.  Overall he still has mild pain in the left knee, no fevers no chills no night sweats, no leakage from the wound.  He will return to his orthopedic physician in 1 week where he will then begin physical therapy.  Patient denies any cough, no headaches, no changes in vision, no chest pain or shortness of breath.    Current medicines (including changes today)  Current Outpatient Medications   Medication Sig Dispense Refill   • atorvastatin (LIPITOR) 40 MG Tab Take 40 mg by mouth every day.     • oxyCODONE-acetaminophen (PERCOCET-10)  MG Tab TAKE 1/2 TO 1 TABLET ORALLY EVERY 4 TO 6 HOURS AS NEEDED FOR PAIN M17.9 7DS     • fluticasone (FLONASE) 50 MCG/ACT nasal spray SPRAY 2 SPRAYS IN NOSE EVERY DAY. 48 mL 1   • enalapril (VASOTEC) 10 MG Tab TAKE 1 TABLET BY MOUTH EVERY DAY. NEEDS LABS 90 Tab 3   • allopurinol (ZYLOPRIM) 300 MG Tab TAKE 1 TABLET BY  "MOUTH EVERY DAY. NEED LAB WORK 90 Tab 3   • Diclofenac Sodium (VOLTAREN) 1 % Gel Apply 2 grams up to 3 times daily for 7 days 1 Tube 1   • doxycycline (VIBRAMYCIN) 100 MG Tab Take 1 Tab by mouth 2 times a day. 14 Tab 0   • Calcium Carbonate-Vitamin D (CALCIUM-D) 600-400 MG-UNIT Tab Take 2 Tabs by mouth every day.       No current facility-administered medications for this visit.     He  has a past medical history of Gout, Gout, and Hypertension.  He  has a past surgical history that includes pr removal spleen, total and pr removal of tonsils,<10 y/o.  Social History     Tobacco Use   • Smoking status: Former Smoker     Packs/day: 0.25     Years: 10.00     Pack years: 2.50     Types: Cigarettes     Quit date: 1975     Years since quittin.2   • Smokeless tobacco: Never Used   Substance Use Topics   • Alcohol use: Yes     Alcohol/week: 4.2 oz     Types: 2 Glasses of wine, 5 Cans of beer per week     Comment: occ   • Drug use: No     Social History     Social History Narrative   • Not on file     Family History   Problem Relation Age of Onset   • Diabetes Father    • No Known Problems Maternal Grandmother    • Cancer Maternal Grandfather         Lung cancer   • No Known Problems Paternal Grandmother    • No Known Problems Paternal Grandfather    • Other Sister         polio     Family Status   Relation Name Status   • Fa   at age 74        heart attack   • Mo     • Sis  Alive   • MGMo     • MGFa     • PGMo     • PGFa     • Sis  Alive         ROS    The pertinent  ROS findings can be seen in the HPI above.     All other systems reviewed and are negative     Objective:     /72 (BP Location: Right arm, Patient Position: Sitting, BP Cuff Size: Adult)   Pulse (!) 106   Temp 36.7 °C (98 °F) (Temporal)   Resp 16   Ht 1.778 m (5' 10\")   Wt 87.1 kg (192 lb)   SpO2 99%  Body mass index is 27.55 kg/m².      Physical Exam:    Constitutional: Alert, no " distress.  Skin: No rashes  Eye: Equal, round and reactive, conjunctiva clear, lids normal.  ENMT: Lips without lesions, good dentition, oropharynx clear.  Neck: Trachea midline, no masses, no thyromegaly. No cervical or supraclavicular lymphadenopathy.  Respiratory: Unlabored respiratory effort, lungs clear to auscultation, no wheezes, no ronchi.  Cardiovascular: Normal S1, S2, no murmur, no edema  Abdomen: Soft, non-tender, no masses, no hepatosplenomegaly.  Left Knee--the left knee is bandaged, nontender to touch, no surrounding erythema he is able to flex to 120 degrees and extend to about 160 degrees.    Assessment and Plan:   The following treatment plan was discussed      1. Examination for, follow-up  2. S/P TKR (total knee replacement), left  3. Hyponatremia    Overall the patient is healing well.  The pain is controlled there has been no fevers.  Repeat labs to evaluate for resolution of hyponatremia  Encouraged him to continue to follow-up with his orthopedic physician and follow throughout physical therapy.    - Comp Metabolic Panel; Future  - CBC WITHOUT DIFFERENTIAL; Future        Instructed to Follow up in clinic or ER for worsening symptoms, difficulty breathing, lack of expected recovery, or should new symptoms or problems arise.    Followup: Return in about 3 months (around 5/18/2021) for Reevaluation.

## 2021-02-27 ENCOUNTER — IMMUNIZATION (OUTPATIENT)
Dept: FAMILY PLANNING/WOMEN'S HEALTH CLINIC | Facility: IMMUNIZATION CENTER | Age: 76
End: 2021-02-27
Payer: COMMERCIAL

## 2021-02-27 DIAGNOSIS — Z23 ENCOUNTER FOR VACCINATION: Primary | ICD-10-CM

## 2021-02-27 PROCEDURE — 91300 PFIZER SARS-COV-2 VACCINE: CPT | Performed by: INTERNAL MEDICINE

## 2021-02-27 PROCEDURE — 0002A PFIZER SARS-COV-2 VACCINE: CPT | Performed by: INTERNAL MEDICINE

## 2021-03-10 ENCOUNTER — HOSPITAL ENCOUNTER (OUTPATIENT)
Dept: LAB | Facility: MEDICAL CENTER | Age: 76
End: 2021-03-10
Attending: FAMILY MEDICINE
Payer: COMMERCIAL

## 2021-03-10 DIAGNOSIS — E87.1 HYPONATREMIA: ICD-10-CM

## 2021-03-27 LAB
ALBUMIN SERPL-MCNC: 3.5 G/DL (ref 3.7–4.7)
ALBUMIN/GLOB SERPL: 1 {RATIO} (ref 1.2–2.2)
ALP SERPL-CCNC: 110 IU/L (ref 39–117)
ALT SERPL-CCNC: 7 IU/L (ref 0–44)
AST SERPL-CCNC: 15 IU/L (ref 0–40)
BILIRUB SERPL-MCNC: 0.5 MG/DL (ref 0–1.2)
BUN SERPL-MCNC: 13 MG/DL (ref 8–27)
BUN/CREAT SERPL: 26 (ref 10–24)
CALCIUM SERPL-MCNC: 9.3 MG/DL (ref 8.6–10.2)
CHLORIDE SERPL-SCNC: 102 MMOL/L (ref 96–106)
CO2 SERPL-SCNC: 25 MMOL/L (ref 20–29)
CREAT SERPL-MCNC: 0.5 MG/DL (ref 0.76–1.27)
ERYTHROCYTE [DISTWIDTH] IN BLOOD BY AUTOMATED COUNT: 15.7 % (ref 11.6–15.4)
GLOBULIN SER CALC-MCNC: 3.6 G/DL (ref 1.5–4.5)
GLUCOSE SERPL-MCNC: 99 MG/DL (ref 65–99)
HCT VFR BLD AUTO: 38.8 % (ref 37.5–51)
HGB BLD-MCNC: 12.5 G/DL (ref 13–17.7)
MCH RBC QN AUTO: 28.2 PG (ref 26.6–33)
MCHC RBC AUTO-ENTMCNC: 32.2 G/DL (ref 31.5–35.7)
MCV RBC AUTO: 87 FL (ref 79–97)
NRBC BLD AUTO-RTO: 1 % (ref 0–0)
PLATELET # BLD AUTO: 132 X10E3/UL (ref 150–450)
POTASSIUM SERPL-SCNC: 4.3 MMOL/L (ref 3.5–5.2)
PROT SERPL-MCNC: 7.1 G/DL (ref 6–8.5)
RBC # BLD AUTO: 4.44 X10E6/UL (ref 4.14–5.8)
SODIUM SERPL-SCNC: 138 MMOL/L (ref 134–144)
WBC # BLD AUTO: 7.1 X10E3/UL (ref 3.4–10.8)

## 2021-03-29 ENCOUNTER — TELEPHONE (OUTPATIENT)
Dept: MEDICAL GROUP | Age: 76
End: 2021-03-29

## 2021-03-29 NOTE — TELEPHONE ENCOUNTER
ESTABLISHED PATIENT PRE-VISIT PLANNING     Patient was NOT contacted to complete PVP.     Note: Patient will not be contacted if there is no indication to call.     1.  Reviewed notes from the last few office visits within the medical group: Yes    2.  If any orders were placed at last visit or intended to be done for this visit (i.e. 6 mos follow-up), do we have Results/Consult Notes?         •  Labs - Labs ordered, completed on 3/25/2021 and results are in chart.  Note: If patient appointment is for lab review and patient did not complete labs, check with provider if OK to reschedule patient until labs completed.       •  Imaging - Imaging was not ordered at last office visit.       •  Referrals - No referrals were ordered at last office visit.    3. Is this appointment scheduled as a Hospital Follow-Up? No    4.  Immunizations were updated in Epic using Reconcile Outside Information activity? No    5.  Patient is due for the following Health Maintenance Topics:   Health Maintenance Due   Topic Date Due   • IMM MENINGOCOCCAL VACCINE (MCV4) (1 - Risk start before 7 months 4-dose series) Never done   • IMM MENINGOCOCCAL B VACCINE AT RISK PATIENTS AGED 10+ (1 of 5 - Risk Trumenba 3-dose series) Never done   • IMM DTaP/Tdap/Td Vaccine (1 - Tdap) 04/08/1964   • IMM ZOSTER VACCINES (1 of 2) Never done   • Annual Wellness Visit  03/30/2018       - Patient plans to schedule appointment for Annual Wellness Visit (AWV).    6.  AHA (Pulse8) form printed for Provider? N/A

## 2021-03-30 ENCOUNTER — OFFICE VISIT (OUTPATIENT)
Dept: MEDICAL GROUP | Age: 76
End: 2021-03-30
Payer: COMMERCIAL

## 2021-03-30 VITALS
OXYGEN SATURATION: 98 % | WEIGHT: 178.8 LBS | HEART RATE: 103 BPM | TEMPERATURE: 97.2 F | BODY MASS INDEX: 25.6 KG/M2 | RESPIRATION RATE: 14 BRPM | HEIGHT: 70 IN | SYSTOLIC BLOOD PRESSURE: 110 MMHG | DIASTOLIC BLOOD PRESSURE: 68 MMHG

## 2021-03-30 DIAGNOSIS — I48.20 CHRONIC ATRIAL FIBRILLATION (HCC): ICD-10-CM

## 2021-03-30 DIAGNOSIS — D64.9 ANEMIA, UNSPECIFIED TYPE: ICD-10-CM

## 2021-03-30 DIAGNOSIS — M1A.0690 CHRONIC GOUT OF KNEE, UNSPECIFIED CAUSE, UNSPECIFIED LATERALITY: ICD-10-CM

## 2021-03-30 PROCEDURE — 99214 OFFICE O/P EST MOD 30 MIN: CPT | Performed by: FAMILY MEDICINE

## 2021-03-30 RX ORDER — APIXABAN 5 MG/1
TABLET, FILM COATED ORAL
COMMUNITY
Start: 2021-02-25 | End: 2021-10-19

## 2021-03-30 RX ORDER — HYDROCODONE BITARTRATE AND ACETAMINOPHEN 5; 325 MG/1; MG/1
TABLET ORAL
COMMUNITY
End: 2021-10-19

## 2021-03-30 RX ORDER — TRAMADOL HYDROCHLORIDE 50 MG/1
50 TABLET ORAL 3 TIMES DAILY PRN
Qty: 90 TABLET | Refills: 0 | Status: SHIPPED | OUTPATIENT
Start: 2021-03-30 | End: 2021-04-29

## 2021-03-30 ASSESSMENT — FIBROSIS 4 INDEX: FIB4 SCORE: 3.26

## 2021-03-31 PROBLEM — I48.91 ATRIAL FIBRILLATION (HCC): Status: ACTIVE | Noted: 2021-03-31

## 2021-03-31 NOTE — PROGRESS NOTES
This medical record contains text that has been entered with the assistance of computer voice recognition and dictation software.  Therefore, it may contain unintended errors in text, spelling, punctuation, or grammar      Chief Complaint   Patient presents with   • Lab Results   • Gout         Alfonso Gaspar is a 75 y.o. male here evaluation and management of: labs      HPI:     1. Anemia, unspecified type  NEW PROBLEM    The patient is a very pleasant 75-year-old male with a significant past medical history of atrial fibrillation on Eliquis 5 mg p.o. twice daily.  Labs recently revealed normocytic anemia hemoglobin was 12.5.  He denies any blood in the stool no dark tarry stool no abdominal pain no vomiting of blood.  He denies any excessive or new fatigue, no presyncopal syncopal episodes, no chest pain no palpitations no headaches.  He had a normal colonoscopy in 2017 only diverticulosis was seen, no polyps no other abnormalities.  He was instructed to repeat the colonoscopy in 2027.    Results for NEDRA GASPAR (MRN [id number]) as of 3/31/2021 07:02   Ref. Range 3/26/2021 05:51   Hemoglobin Latest Ref Range: 13.0 - 17.7 g/dL 12.5 (L)   Hematocrit Latest Ref Range: 37.5 - 51.0 % 38.8   MCV Latest Ref Range: 79 - 97 fL 87       2. Chronic gout of knee, unspecified cause, unspecified laterality    The patient states that he continues to get bilateral knee pain from gout.  He states that he has changed his diet as much as he could but most things on the gout diet list are in everyday foods.  He has been compliant with his allopurinol.    Current medicines (including changes today)  Current Outpatient Medications   Medication Sig Dispense Refill   • ELIQUIS 5 MG Tab      • HYDROcodone-acetaminophen (NORCO) 5-325 MG Tab per tablet hydrocodone 5 mg-acetaminophen 325 mg tablet   TAKE 1 TABLET EVERY 6 HOURS BY ORAL ROUTE FOR 7 DAYS. M17.9     • traMADol (ULTRAM) 50 MG Tab Take 1 tablet by mouth 3 times a day as needed for  "up to 30 days. Prn gout attack 90 tablet 0   • atorvastatin (LIPITOR) 40 MG Tab Take 40 mg by mouth every day.     • enalapril (VASOTEC) 10 MG Tab TAKE 1 TABLET BY MOUTH EVERY DAY. NEEDS LABS 90 Tab 3   • allopurinol (ZYLOPRIM) 300 MG Tab TAKE 1 TABLET BY MOUTH EVERY DAY. NEED LAB WORK 90 Tab 3   • Calcium Carbonate-Vitamin D (CALCIUM-D) 600-400 MG-UNIT Tab Take 2 Tabs by mouth every day.       No current facility-administered medications for this visit.     He  has a past medical history of Gout, Gout, and Hypertension.  He  has a past surgical history that includes pr removal spleen, total and pr removal of tonsils,<10 y/o.  Social History     Tobacco Use   • Smoking status: Former Smoker     Packs/day: 0.25     Years: 10.00     Pack years: 2.50     Types: Cigarettes     Quit date: 1975     Years since quittin.3   • Smokeless tobacco: Never Used   Substance Use Topics   • Alcohol use: Yes     Alcohol/week: 4.2 oz     Types: 2 Glasses of wine, 5 Cans of beer per week     Comment: occ   • Drug use: No     Social History     Social History Narrative   • Not on file     Family History   Problem Relation Age of Onset   • Diabetes Father    • No Known Problems Maternal Grandmother    • Cancer Maternal Grandfather         Lung cancer   • No Known Problems Paternal Grandmother    • No Known Problems Paternal Grandfather    • Other Sister         polio     Family Status   Relation Name Status   • Fa   at age 74        heart attack   • Mo     • Sis  Alive   • MGMo     • MGFa     • PGMo     • PGFa     • Sis  Alive         ROS    The pertinent  ROS findings can be seen in the HPI above.     All other systems reviewed and are negative     Objective:     /68 (BP Location: Right arm, Patient Position: Sitting, BP Cuff Size: Adult)   Pulse (!) 103   Temp 36.2 °C (97.2 °F) (Temporal)   Resp 14   Ht 1.778 m (5' 10\")   Wt 81.1 kg (178 lb 12.8 oz)   SpO2 98%  Body " mass index is 25.66 kg/m².      Physical Exam:    Constitutional: Alert, no distress.  Skin: no rashes  Eye: Equal, round and reactive, conjunctiva clear, lids normal.  ENMT: Lips without lesions, good dentition, oropharynx clear.  Neck: Trachea midline, no masses, no thyromegaly. No cervical or supraclavicular lymphadenopathy.  Respiratory: Unlabored respiratory effort, lungs clear to auscultation, no wheezes, no ronchi.  Cardiovascular: Normal S1, S2, no murmur, no edema  Abdomen: Soft, non-tender, no masses, no hepatosplenomegaly.        Assessment and Plan:   The following treatment plan was discussed      1. Anemia, unspecified type    We will obtain iron studies  Repeat CBC  He is up-to-date on colon cancer screening    - CBC WITHOUT DIFFERENTIAL; Future  - FERRITIN; Future  - IRON/TOTAL IRON BIND; Future    2. Chronic gout of knee, unspecified cause, unspecified laterality    Recommended that he avoid NSAIDs for his gout given the new onset anemia.  He may take tramadol for gout attacks  Continue allopurinol    - URIC ACID; Future  - traMADol (ULTRAM) 50 MG Tab; Take 1 tablet by mouth 3 times a day as needed for up to 30 days. Prn gout attack  Dispense: 90 tablet; Refill: 0            Instructed to Follow up in clinic or ER for worsening symptoms, difficulty breathing, lack of expected recovery, or should new symptoms or problems arise.    Followup: Return in about 3 months (around 6/30/2021) for Reevaluation, labs.

## 2021-04-01 LAB
ERYTHROCYTE [DISTWIDTH] IN BLOOD BY AUTOMATED COUNT: 15.7 % (ref 11.6–15.4)
FERRITIN SERPL-MCNC: 492 NG/ML (ref 30–400)
HCT VFR BLD AUTO: 43.9 % (ref 37.5–51)
HGB BLD-MCNC: 13.3 G/DL (ref 13–17.7)
IRON SATN MFR SERPL: 16 % (ref 15–55)
IRON SERPL-MCNC: 37 UG/DL (ref 38–169)
MCH RBC QN AUTO: 27.4 PG (ref 26.6–33)
MCHC RBC AUTO-ENTMCNC: 30.3 G/DL (ref 31.5–35.7)
MCV RBC AUTO: 90 FL (ref 79–97)
NRBC BLD AUTO-RTO: 1 % (ref 0–0)
RBC # BLD AUTO: 4.86 X10E6/UL (ref 4.14–5.8)
TIBC SERPL-MCNC: 227 UG/DL (ref 250–450)
UIBC SERPL-MCNC: 190 UG/DL (ref 111–343)
URATE SERPL-MCNC: 3.5 MG/DL (ref 3.8–8.4)
WBC # BLD AUTO: 8 X10E3/UL (ref 3.4–10.8)

## 2021-04-20 ENCOUNTER — OFFICE VISIT (OUTPATIENT)
Dept: MEDICAL GROUP | Age: 76
End: 2021-04-20
Payer: COMMERCIAL

## 2021-04-20 VITALS
DIASTOLIC BLOOD PRESSURE: 64 MMHG | SYSTOLIC BLOOD PRESSURE: 110 MMHG | OXYGEN SATURATION: 94 % | HEART RATE: 74 BPM | WEIGHT: 174.6 LBS | TEMPERATURE: 97.4 F | BODY MASS INDEX: 25 KG/M2 | HEIGHT: 70 IN | RESPIRATION RATE: 14 BRPM

## 2021-04-20 DIAGNOSIS — R04.0 BLEEDING FROM THE NOSE: ICD-10-CM

## 2021-04-20 DIAGNOSIS — I48.20 CHRONIC ATRIAL FIBRILLATION (HCC): ICD-10-CM

## 2021-04-20 PROCEDURE — 99214 OFFICE O/P EST MOD 30 MIN: CPT | Performed by: FAMILY MEDICINE

## 2021-04-20 ASSESSMENT — FIBROSIS 4 INDEX: FIB4 SCORE: 3.31

## 2021-06-20 DIAGNOSIS — M1A.09X0 CHRONIC GOUT OF MULTIPLE SITES, UNSPECIFIED CAUSE: ICD-10-CM

## 2021-06-22 RX ORDER — ALLOPURINOL 300 MG/1
TABLET ORAL
Qty: 90 TABLET | Refills: 3 | Status: SHIPPED | OUTPATIENT
Start: 2021-06-22

## 2021-10-18 ENCOUNTER — HOSPITAL ENCOUNTER (INPATIENT)
Facility: MEDICAL CENTER | Age: 76
LOS: 16 days | DRG: 835 | End: 2021-11-05
Attending: EMERGENCY MEDICINE | Admitting: INTERNAL MEDICINE
Payer: COMMERCIAL

## 2021-10-18 DIAGNOSIS — D64.9 ANEMIA, UNSPECIFIED TYPE: ICD-10-CM

## 2021-10-18 DIAGNOSIS — D72.829 LEUKOCYTOSIS, UNSPECIFIED TYPE: ICD-10-CM

## 2021-10-18 DIAGNOSIS — D69.6 THROMBOCYTOPENIA (HCC): ICD-10-CM

## 2021-10-18 DIAGNOSIS — C92.00 ACUTE MYELOID LEUKEMIA NOT HAVING ACHIEVED REMISSION (HCC): ICD-10-CM

## 2021-10-18 LAB
ALBUMIN SERPL BCP-MCNC: 3.1 G/DL (ref 3.2–4.9)
ALBUMIN/GLOB SERPL: 1 G/DL
ALP SERPL-CCNC: 138 U/L (ref 30–99)
ALT SERPL-CCNC: 21 U/L (ref 2–50)
ANION GAP SERPL CALC-SCNC: 10 MMOL/L (ref 7–16)
AST SERPL-CCNC: 47 U/L (ref 12–45)
BILIRUB SERPL-MCNC: 0.7 MG/DL (ref 0.1–1.5)
BUN SERPL-MCNC: 24 MG/DL (ref 8–22)
CALCIUM SERPL-MCNC: 8.1 MG/DL (ref 8.5–10.5)
CHLORIDE SERPL-SCNC: 100 MMOL/L (ref 96–112)
CO2 SERPL-SCNC: 21 MMOL/L (ref 20–33)
CREAT SERPL-MCNC: 0.58 MG/DL (ref 0.5–1.4)
GLOBULIN SER CALC-MCNC: 3.2 G/DL (ref 1.9–3.5)
GLUCOSE SERPL-MCNC: 105 MG/DL (ref 65–99)
LDH SERPL L TO P-CCNC: 404 U/L (ref 107–266)
POTASSIUM SERPL-SCNC: 4.9 MMOL/L (ref 3.6–5.5)
PROT SERPL-MCNC: 6.3 G/DL (ref 6–8.2)
SODIUM SERPL-SCNC: 131 MMOL/L (ref 135–145)

## 2021-10-18 PROCEDURE — 85730 THROMBOPLASTIN TIME PARTIAL: CPT

## 2021-10-18 PROCEDURE — 85610 PROTHROMBIN TIME: CPT

## 2021-10-18 PROCEDURE — 86850 RBC ANTIBODY SCREEN: CPT

## 2021-10-18 PROCEDURE — 80500 HCHG CLINICAL PATH CONSULT-LIMITED: CPT

## 2021-10-18 PROCEDURE — 83735 ASSAY OF MAGNESIUM: CPT

## 2021-10-18 PROCEDURE — 85027 COMPLETE CBC AUTOMATED: CPT

## 2021-10-18 PROCEDURE — 83615 LACTATE (LD) (LDH) ENZYME: CPT

## 2021-10-18 PROCEDURE — 85007 BL SMEAR W/DIFF WBC COUNT: CPT

## 2021-10-18 PROCEDURE — 86900 BLOOD TYPING SEROLOGIC ABO: CPT

## 2021-10-18 PROCEDURE — 99291 CRITICAL CARE FIRST HOUR: CPT

## 2021-10-18 PROCEDURE — 86920 COMPATIBILITY TEST SPIN: CPT | Mod: 91

## 2021-10-18 PROCEDURE — 86901 BLOOD TYPING SEROLOGIC RH(D): CPT

## 2021-10-18 PROCEDURE — 80053 COMPREHEN METABOLIC PANEL: CPT

## 2021-10-18 PROCEDURE — 30233N1 TRANSFUSION OF NONAUTOLOGOUS RED BLOOD CELLS INTO PERIPHERAL VEIN, PERCUTANEOUS APPROACH: ICD-10-PCS | Performed by: STUDENT IN AN ORGANIZED HEALTH CARE EDUCATION/TRAINING PROGRAM

## 2021-10-18 ASSESSMENT — FIBROSIS 4 INDEX: FIB4 SCORE: 3.31

## 2021-10-19 PROBLEM — D69.6 THROMBOCYTOPENIA (HCC): Status: ACTIVE | Noted: 2021-10-19

## 2021-10-19 PROBLEM — D72.829 LEUKOCYTOSIS: Status: ACTIVE | Noted: 2021-10-19

## 2021-10-19 LAB
ABO + RH BLD: NORMAL
ABO GROUP BLD: ABNORMAL
ANISOCYTOSIS BLD QL SMEAR: ABNORMAL
ANISOCYTOSIS BLD QL SMEAR: ABNORMAL
APTT PPP: 37.4 SEC (ref 24.7–36)
BARCODED ABORH UBTYP: 5100
BARCODED ABORH UBTYP: 5100
BARCODED PRD CODE UBPRD: ABNORMAL
BARCODED PRD CODE UBPRD: ABNORMAL
BARCODED UNIT NUM UBUNT: ABNORMAL
BARCODED UNIT NUM UBUNT: ABNORMAL
BASO STIPL BLD QL SMEAR: NORMAL
BASOPHILS # BLD AUTO: 0 % (ref 0–1.8)
BASOPHILS # BLD AUTO: 0 % (ref 0–1.8)
BASOPHILS # BLD: 0 K/UL (ref 0–0.12)
BASOPHILS # BLD: 0 K/UL (ref 0–0.12)
BLASTS NFR BLD MANUAL: 43.8 %
BLD GP AB SCN SERPL QL: ABNORMAL
COMPONENT R 8504R: ABNORMAL
COMPONENT R 8504R: ABNORMAL
EOSINOPHIL # BLD AUTO: 0 K/UL (ref 0–0.51)
EOSINOPHIL # BLD AUTO: 0 K/UL (ref 0–0.51)
EOSINOPHIL NFR BLD: 0 % (ref 0–6.9)
EOSINOPHIL NFR BLD: 0 % (ref 0–6.9)
ERYTHROCYTE [DISTWIDTH] IN BLOOD BY AUTOMATED COUNT: 109.1 FL (ref 35.9–50)
ERYTHROCYTE [DISTWIDTH] IN BLOOD BY AUTOMATED COUNT: 113 FL (ref 35.9–50)
FIBRINOGEN PPP-MCNC: 231 MG/DL (ref 215–460)
FOLATE SERPL-MCNC: 16.5 NG/ML
GIANT PLATELETS BLD QL SMEAR: NORMAL
HCT VFR BLD AUTO: 23.1 % (ref 42–52)
HCT VFR BLD AUTO: 23.3 % (ref 42–52)
HCT VFR BLD AUTO: 24 % (ref 42–52)
HGB BLD-MCNC: 7.3 G/DL (ref 14–18)
HGB BLD-MCNC: 7.4 G/DL (ref 14–18)
HGB BLD-MCNC: 7.8 G/DL (ref 14–18)
HGB RETIC QN AUTO: 29.8 PG/CELL (ref 29–35)
HOWELL-JOLLY BOD BLD QL SMEAR: NORMAL
HYPOCHROMIA BLD QL SMEAR: ABNORMAL
HYPOCHROMIA BLD QL SMEAR: ABNORMAL
IMM RETICS NFR: 37.6 % (ref 9.3–17.4)
INR PPP: 1.29 (ref 0.87–1.13)
LG PLATELETS BLD QL SMEAR: NORMAL
LYMPHOCYTES # BLD AUTO: 11.63 K/UL (ref 1–4.8)
LYMPHOCYTES # BLD AUTO: 13.91 K/UL (ref 1–4.8)
LYMPHOCYTES NFR BLD: 25.9 % (ref 22–41)
LYMPHOCYTES NFR BLD: 49.5 % (ref 22–41)
MACROCYTES BLD QL SMEAR: ABNORMAL
MACROCYTES BLD QL SMEAR: ABNORMAL
MAGNESIUM SERPL-MCNC: 1.8 MG/DL (ref 1.5–2.5)
MANUAL DIFF BLD: NORMAL
MANUAL DIFF BLD: NORMAL
MCH RBC QN AUTO: 35.1 PG (ref 27–33)
MCH RBC QN AUTO: 35.7 PG (ref 27–33)
MCHC RBC AUTO-ENTMCNC: 32 G/DL (ref 33.7–35.3)
MCHC RBC AUTO-ENTMCNC: 32.5 G/DL (ref 33.7–35.3)
MCV RBC AUTO: 108.1 FL (ref 81.4–97.8)
MCV RBC AUTO: 111.6 FL (ref 81.4–97.8)
MICROCYTES BLD QL SMEAR: ABNORMAL
MICROCYTES BLD QL SMEAR: ABNORMAL
MONOCYTES # BLD AUTO: 0.53 K/UL (ref 0–0.85)
MONOCYTES # BLD AUTO: 3.19 K/UL (ref 0–0.85)
MONOCYTES NFR BLD AUTO: 1.9 % (ref 0–13.4)
MONOCYTES NFR BLD AUTO: 7.1 % (ref 0–13.4)
MORPHOLOGY BLD-IMP: NORMAL
MORPHOLOGY BLD-IMP: NORMAL
NEUTROPHILS # BLD AUTO: 1.35 K/UL (ref 1.82–7.42)
NEUTROPHILS # BLD AUTO: 3.23 K/UL (ref 1.82–7.42)
NEUTROPHILS NFR BLD: 4.8 % (ref 44–72)
NEUTROPHILS NFR BLD: 7.2 % (ref 44–72)
NRBC # BLD AUTO: 4.15 K/UL
NRBC # BLD AUTO: 4.77 K/UL
NRBC BLD-RTO: 10.6 /100 WBC
NRBC BLD-RTO: 14.8 /100 WBC
OVALOCYTES BLD QL SMEAR: NORMAL
PATH REV: NORMAL
PATH REV: NORMAL
PATHOLOGY CONSULT NOTE: NORMAL
PLATELET # BLD AUTO: 68 K/UL (ref 164–446)
PLATELET # BLD AUTO: 77 K/UL (ref 164–446)
PLATELET BLD QL SMEAR: NORMAL
PLATELET BLD QL SMEAR: NORMAL
POIKILOCYTOSIS BLD QL SMEAR: NORMAL
POIKILOCYTOSIS BLD QL SMEAR: NORMAL
POLYCHROMASIA BLD QL SMEAR: NORMAL
POLYCHROMASIA BLD QL SMEAR: NORMAL
PRODUCT TYPE UPROD: ABNORMAL
PRODUCT TYPE UPROD: ABNORMAL
PROTHROMBIN TIME: 15.7 SEC (ref 12–14.6)
RBC # BLD AUTO: 2.07 M/UL (ref 4.7–6.1)
RBC # BLD AUTO: 2.22 M/UL (ref 4.7–6.1)
RBC BLD AUTO: PRESENT
RBC BLD AUTO: PRESENT
RETICS # AUTO: 0.12 M/UL (ref 0.04–0.06)
RETICS/RBC NFR: 5.6 % (ref 0.8–2.1)
RH BLD: ABNORMAL
SCHISTOCYTES BLD QL SMEAR: NORMAL
SMUDGE CELLS BLD QL SMEAR: NORMAL
STOMATOCYTES BLD QL SMEAR: NORMAL
TARGETS BLD QL SMEAR: NORMAL
UNIT STATUS USTAT: ABNORMAL
UNIT STATUS USTAT: ABNORMAL
VARIANT LYMPHS BLD QL SMEAR: NORMAL
VIT B12 SERPL-MCNC: 1878 PG/ML (ref 211–911)
WBC # BLD AUTO: 28.1 K/UL (ref 4.8–10.8)
WBC # BLD AUTO: 44.9 K/UL (ref 4.8–10.8)
WBC OTHER NFR BLD MANUAL: 59.8 %

## 2021-10-19 PROCEDURE — 88264 CHROMOSOME ANALYSIS 20-25: CPT

## 2021-10-19 PROCEDURE — 88185 FLOWCYTOMETRY/TC ADD-ON: CPT | Mod: 91

## 2021-10-19 PROCEDURE — 88311 DECALCIFY TISSUE: CPT | Mod: 91

## 2021-10-19 PROCEDURE — 700111 HCHG RX REV CODE 636 W/ 250 OVERRIDE (IP): Performed by: HOSPITALIST

## 2021-10-19 PROCEDURE — 160027 HCHG SURGERY MINUTES - 1ST 30 MINS LEVEL 2: Performed by: HOSPITALIST

## 2021-10-19 PROCEDURE — G0378 HOSPITAL OBSERVATION PER HR: HCPCS

## 2021-10-19 PROCEDURE — 700102 HCHG RX REV CODE 250 W/ 637 OVERRIDE(OP): Performed by: GENERAL PRACTICE

## 2021-10-19 PROCEDURE — 88313 SPECIAL STAINS GROUP 2: CPT

## 2021-10-19 PROCEDURE — A9270 NON-COVERED ITEM OR SERVICE: HCPCS | Performed by: STUDENT IN AN ORGANIZED HEALTH CARE EDUCATION/TRAINING PROGRAM

## 2021-10-19 PROCEDURE — 700105 HCHG RX REV CODE 258: Performed by: EMERGENCY MEDICINE

## 2021-10-19 PROCEDURE — 160035 HCHG PACU - 1ST 60 MINS PHASE I: Performed by: HOSPITALIST

## 2021-10-19 PROCEDURE — 160048 HCHG OR STATISTICAL LEVEL 1-5: Performed by: HOSPITALIST

## 2021-10-19 PROCEDURE — 85007 BL SMEAR W/DIFF WBC COUNT: CPT

## 2021-10-19 PROCEDURE — 07DR3ZX EXTRACTION OF ILIAC BONE MARROW, PERCUTANEOUS APPROACH, DIAGNOSTIC: ICD-10-PCS | Performed by: HOSPITALIST

## 2021-10-19 PROCEDURE — 99152 MOD SED SAME PHYS/QHP 5/>YRS: CPT | Performed by: HOSPITALIST

## 2021-10-19 PROCEDURE — 81218 CEBPA GENE FULL SEQUENCE: CPT

## 2021-10-19 PROCEDURE — 88374 M/PHMTRC ALYS ISHQUANT/SEMIQ: CPT | Mod: 91

## 2021-10-19 PROCEDURE — 81310 NPM1 GENE: CPT

## 2021-10-19 PROCEDURE — 36415 COLL VENOUS BLD VENIPUNCTURE: CPT

## 2021-10-19 PROCEDURE — 85014 HEMATOCRIT: CPT

## 2021-10-19 PROCEDURE — 88184 FLOWCYTOMETRY/ TC 1 MARKER: CPT

## 2021-10-19 PROCEDURE — 81479 UNLISTED MOLECULAR PATHOLOGY: CPT | Mod: 91

## 2021-10-19 PROCEDURE — 82607 VITAMIN B-12: CPT

## 2021-10-19 PROCEDURE — 36430 TRANSFUSION BLD/BLD COMPNT: CPT

## 2021-10-19 PROCEDURE — 82746 ASSAY OF FOLIC ACID SERUM: CPT

## 2021-10-19 PROCEDURE — A9270 NON-COVERED ITEM OR SERVICE: HCPCS | Performed by: GENERAL PRACTICE

## 2021-10-19 PROCEDURE — 99220 PR INITIAL OBSERVATION CARE,LEVL III: CPT | Mod: 25 | Performed by: STUDENT IN AN ORGANIZED HEALTH CARE EDUCATION/TRAINING PROGRAM

## 2021-10-19 PROCEDURE — 700102 HCHG RX REV CODE 250 W/ 637 OVERRIDE(OP): Performed by: STUDENT IN AN ORGANIZED HEALTH CARE EDUCATION/TRAINING PROGRAM

## 2021-10-19 PROCEDURE — 85027 COMPLETE CBC AUTOMATED: CPT

## 2021-10-19 PROCEDURE — 160002 HCHG RECOVERY MINUTES (STAT): Performed by: HOSPITALIST

## 2021-10-19 PROCEDURE — 85384 FIBRINOGEN ACTIVITY: CPT

## 2021-10-19 PROCEDURE — 85018 HEMOGLOBIN: CPT

## 2021-10-19 PROCEDURE — 85046 RETICYTE/HGB CONCENTRATE: CPT

## 2021-10-19 PROCEDURE — 38222 DX BONE MARROW BX & ASPIR: CPT | Performed by: HOSPITALIST

## 2021-10-19 PROCEDURE — 88237 TISSUE CULTURE BONE MARROW: CPT

## 2021-10-19 PROCEDURE — P9016 RBC LEUKOCYTES REDUCED: HCPCS

## 2021-10-19 PROCEDURE — 88305 TISSUE EXAM BY PATHOLOGIST: CPT | Mod: 59

## 2021-10-19 RX ORDER — AMOXICILLIN 250 MG
2 CAPSULE ORAL 2 TIMES DAILY
Status: DISCONTINUED | OUTPATIENT
Start: 2021-10-19 | End: 2021-11-05 | Stop reason: HOSPADM

## 2021-10-19 RX ORDER — FERROUS SULFATE 325(65) MG
325 TABLET ORAL DAILY
Status: ON HOLD | COMMUNITY
End: 2021-11-05

## 2021-10-19 RX ORDER — IPRATROPIUM BROMIDE AND ALBUTEROL SULFATE 2.5; .5 MG/3ML; MG/3ML
SOLUTION RESPIRATORY (INHALATION)
Status: ACTIVE
Start: 2021-10-19 | End: 2021-10-20

## 2021-10-19 RX ORDER — MIDAZOLAM HYDROCHLORIDE 1 MG/ML
.5-2 INJECTION INTRAMUSCULAR; INTRAVENOUS PRN
Status: DISCONTINUED | OUTPATIENT
Start: 2021-10-19 | End: 2021-10-19 | Stop reason: HOSPADM

## 2021-10-19 RX ORDER — SODIUM CHLORIDE 9 MG/ML
INJECTION, SOLUTION INTRAVENOUS CONTINUOUS
Status: ACTIVE | OUTPATIENT
Start: 2021-10-19 | End: 2021-10-19

## 2021-10-19 RX ORDER — CEFDINIR 300 MG/1
300 CAPSULE ORAL 2 TIMES DAILY
Status: ON HOLD | COMMUNITY
End: 2021-11-05

## 2021-10-19 RX ORDER — ONDANSETRON 2 MG/ML
4 INJECTION INTRAMUSCULAR; INTRAVENOUS EVERY 4 HOURS PRN
Status: DISCONTINUED | OUTPATIENT
Start: 2021-10-19 | End: 2021-11-05 | Stop reason: HOSPADM

## 2021-10-19 RX ORDER — ENALAPRILAT 1.25 MG/ML
1.25 INJECTION INTRAVENOUS EVERY 6 HOURS PRN
Status: DISCONTINUED | OUTPATIENT
Start: 2021-10-19 | End: 2021-11-05 | Stop reason: HOSPADM

## 2021-10-19 RX ORDER — HYDROCODONE BITARTRATE AND ACETAMINOPHEN 5; 325 MG/1; MG/1
1 TABLET ORAL EVERY 6 HOURS PRN
Status: DISCONTINUED | OUTPATIENT
Start: 2021-10-19 | End: 2021-10-19

## 2021-10-19 RX ORDER — FLECAINIDE ACETATE 50 MG/1
50 TABLET ORAL 2 TIMES DAILY
COMMUNITY

## 2021-10-19 RX ORDER — ACETAMINOPHEN 160 MG
1 TABLET,DISINTEGRATING ORAL DAILY
COMMUNITY

## 2021-10-19 RX ORDER — POLYETHYLENE GLYCOL 3350 17 G/17G
1 POWDER, FOR SOLUTION ORAL
Status: DISCONTINUED | OUTPATIENT
Start: 2021-10-19 | End: 2021-11-05 | Stop reason: HOSPADM

## 2021-10-19 RX ORDER — BISACODYL 10 MG
10 SUPPOSITORY, RECTAL RECTAL
Status: DISCONTINUED | OUTPATIENT
Start: 2021-10-19 | End: 2021-11-05 | Stop reason: HOSPADM

## 2021-10-19 RX ORDER — SENNOSIDES 8.6 MG
650 CAPSULE ORAL EVERY 6 HOURS
COMMUNITY

## 2021-10-19 RX ORDER — ACETAMINOPHEN 325 MG/1
650 TABLET ORAL EVERY 6 HOURS PRN
Status: DISCONTINUED | OUTPATIENT
Start: 2021-10-19 | End: 2021-11-01

## 2021-10-19 RX ORDER — ALBUTEROL SULFATE 90 UG/1
AEROSOL, METERED RESPIRATORY (INHALATION)
COMMUNITY

## 2021-10-19 RX ORDER — FLECAINIDE ACETATE 50 MG/1
50 TABLET ORAL TWICE DAILY
Status: DISCONTINUED | OUTPATIENT
Start: 2021-10-19 | End: 2021-11-05 | Stop reason: HOSPADM

## 2021-10-19 RX ORDER — SODIUM CHLORIDE 9 MG/ML
500 INJECTION, SOLUTION INTRAVENOUS
Status: DISCONTINUED | OUTPATIENT
Start: 2021-10-19 | End: 2021-10-19 | Stop reason: HOSPADM

## 2021-10-19 RX ORDER — MIDAZOLAM HYDROCHLORIDE 1 MG/ML
INJECTION INTRAMUSCULAR; INTRAVENOUS
Status: DISPENSED
Start: 2021-10-19 | End: 2021-10-20

## 2021-10-19 RX ORDER — ENALAPRIL MALEATE 10 MG/1
10 TABLET ORAL
Status: DISCONTINUED | OUTPATIENT
Start: 2021-10-19 | End: 2021-10-31

## 2021-10-19 RX ORDER — LABETALOL HYDROCHLORIDE 5 MG/ML
10 INJECTION, SOLUTION INTRAVENOUS EVERY 4 HOURS PRN
Status: DISCONTINUED | OUTPATIENT
Start: 2021-10-19 | End: 2021-11-05 | Stop reason: HOSPADM

## 2021-10-19 RX ORDER — DEXTROSE MONOHYDRATE 25 G/50ML
50 INJECTION, SOLUTION INTRAVENOUS
Status: DISCONTINUED | OUTPATIENT
Start: 2021-10-19 | End: 2021-11-05 | Stop reason: HOSPADM

## 2021-10-19 RX ORDER — HEPARIN SODIUM 5000 [USP'U]/ML
5000 INJECTION, SOLUTION INTRAVENOUS; SUBCUTANEOUS EVERY 8 HOURS
Status: DISCONTINUED | OUTPATIENT
Start: 2021-10-19 | End: 2021-10-19

## 2021-10-19 RX ORDER — TRAZODONE HYDROCHLORIDE 100 MG/1
50 TABLET ORAL
Status: DISCONTINUED | OUTPATIENT
Start: 2021-10-19 | End: 2021-10-23

## 2021-10-19 RX ORDER — ALLOPURINOL 300 MG/1
300 TABLET ORAL DAILY
Status: DISCONTINUED | OUTPATIENT
Start: 2021-10-19 | End: 2021-10-19

## 2021-10-19 RX ORDER — ALLOPURINOL 300 MG/1
300 TABLET ORAL DAILY
Status: DISCONTINUED | OUTPATIENT
Start: 2021-10-20 | End: 2021-10-26

## 2021-10-19 RX ORDER — ONDANSETRON 4 MG/1
4 TABLET, ORALLY DISINTEGRATING ORAL EVERY 4 HOURS PRN
Status: DISCONTINUED | OUTPATIENT
Start: 2021-10-19 | End: 2021-11-05 | Stop reason: HOSPADM

## 2021-10-19 RX ADMIN — TRAZODONE HYDROCHLORIDE 50 MG: 100 TABLET ORAL at 21:04

## 2021-10-19 RX ADMIN — FLECAINIDE ACETATE 50 MG: 50 TABLET ORAL at 17:07

## 2021-10-19 RX ADMIN — ENALAPRIL MALEATE 10 MG: 10 TABLET ORAL at 05:35

## 2021-10-19 RX ADMIN — SODIUM CHLORIDE: 9 INJECTION, SOLUTION INTRAVENOUS at 01:40

## 2021-10-19 RX ADMIN — ALLOPURINOL 300 MG: 300 TABLET ORAL at 05:35

## 2021-10-19 ASSESSMENT — ENCOUNTER SYMPTOMS
VOMITING: 0
COUGH: 0
NERVOUS/ANXIOUS: 0
DIAPHORESIS: 0
NAUSEA: 0
HEARTBURN: 0
HEMOPTYSIS: 0
PALPITATIONS: 0
DOUBLE VISION: 0
SHORTNESS OF BREATH: 0
MYALGIAS: 0
WEIGHT LOSS: 0
EYE PAIN: 0
SPUTUM PRODUCTION: 0
FEVER: 0
TINGLING: 0
SEIZURES: 0
CHILLS: 0
SORE THROAT: 0
BLURRED VISION: 0
ABDOMINAL PAIN: 0
TREMORS: 0

## 2021-10-19 ASSESSMENT — COGNITIVE AND FUNCTIONAL STATUS - GENERAL
HELP NEEDED FOR BATHING: A LITTLE
CLIMB 3 TO 5 STEPS WITH RAILING: A LITTLE
MOBILITY SCORE: 23
SUGGESTED CMS G CODE MODIFIER DAILY ACTIVITY: CJ
SUGGESTED CMS G CODE MODIFIER MOBILITY: CI
DRESSING REGULAR LOWER BODY CLOTHING: A LITTLE
DAILY ACTIVITIY SCORE: 22

## 2021-10-19 ASSESSMENT — LIFESTYLE VARIABLES
HAVE YOU EVER FELT YOU SHOULD CUT DOWN ON YOUR DRINKING: NO
HAVE PEOPLE ANNOYED YOU BY CRITICIZING YOUR DRINKING: NO
EVER HAD A DRINK FIRST THING IN THE MORNING TO STEADY YOUR NERVES TO GET RID OF A HANGOVER: NO
SUBSTANCE_ABUSE: 0
HOW MANY TIMES IN THE PAST YEAR HAVE YOU HAD 5 OR MORE DRINKS IN A DAY: 0
DOES PATIENT WANT TO STOP DRINKING: NO
EVER FELT BAD OR GUILTY ABOUT YOUR DRINKING: NO
TOTAL SCORE: 0
ALCOHOL_USE: NO
TOTAL SCORE: 0
CONSUMPTION TOTAL: NEGATIVE
AVERAGE NUMBER OF DAYS PER WEEK YOU HAVE A DRINK CONTAINING ALCOHOL: 0
TOTAL SCORE: 0
ON A TYPICAL DAY WHEN YOU DRINK ALCOHOL HOW MANY DRINKS DO YOU HAVE: 0

## 2021-10-19 ASSESSMENT — PATIENT HEALTH QUESTIONNAIRE - PHQ9
SUM OF ALL RESPONSES TO PHQ9 QUESTIONS 1 AND 2: 0
2. FEELING DOWN, DEPRESSED, IRRITABLE, OR HOPELESS: NOT AT ALL
1. LITTLE INTEREST OR PLEASURE IN DOING THINGS: NOT AT ALL

## 2021-10-19 ASSESSMENT — FIBROSIS 4 INDEX: FIB4 SCORE: 10.26

## 2021-10-19 NOTE — ED NOTES
Unit of blood completed at this time. No s/s of transfusion reaction. VS stable. Pt to be admitted. Awaiting bed placement

## 2021-10-19 NOTE — ASSESSMENT & PLAN NOTE
Likely secondary to above  S/p bone marrow biopsy 10/19/2021 by Dr. Arthur Caruso  Oncology consulted, Dr. ALEX Anguiano   Daily CBC w diff  Continue treatment per oncology

## 2021-10-19 NOTE — ED NOTES
Patient calm and in room with family at bedside. Respirations even and unlabored. No pain or distress reported. Second ABO confirmation drawn and sent to lab. Will await result. Pt awaiting transfusion of one unit. Pt to be admitted. Awaiting hospitalist consult and bed placement.

## 2021-10-19 NOTE — ASSESSMENT & PLAN NOTE
Secondary to above   Tranfuse for PLT < 10,000   Continue Daily CBC  10/27:  4 units plts ordered secondary to active bleed.   10/31:  Plt 16.  Transfused.  11/2: PLT 9. Transfused   11/4: PLT 18

## 2021-10-19 NOTE — ED PROVIDER NOTES
ED Provider Note    CHIEF COMPLAINT  Chief Complaint   Patient presents with   • Abnormal Labs     sent here by PCP Dr. Moreno for low H/H and needs a blood transfusion. Told that Dr. Flores and/or Dr. Anguiano to see patient for possible dx of leukemia.        HPI  Alfonso Gaspar is a 76 y.o. male who presents to the emergency department for abnormal labs. Past medical history as documented below. Patient did have COVID at the beginning of summer. More recently however the last month he is been having poly arthralgias and recurrent epistaxis. Multiple ER visits for epistaxis control. Outpatient workup to monitor hemoglobin should continue to show down trending with most recent labs as of earlier today with a hemoglobin of 7.1. With this finding the primary care physician wanted the patient to go to the ER for blood transfusion. Furthermore there was an elevated WBC which the primary care physician was concerned about possible oncologic process. Oncology was consulted and wanted the patient to present to [facility]. Patient however went to [facility] with misunderstanding and was checking in and ultimately diverted to this facility for further care and workup. By and large currently asymptomatic. Somewhat fatigued.    Chart review: labs this morning hemoglobin 7.1, hematocrit 21.9, platelets 72.    REVIEW OF SYSTEMS  See HPI for further details. All other systems are negative.     PAST MEDICAL HISTORY   has a past medical history of Gout, Gout, and Hypertension.    SOCIAL HISTORY  Social History     Tobacco Use   • Smoking status: Former Smoker     Packs/day: 0.25     Years: 10.00     Pack years: 2.50     Types: Cigarettes     Quit date: 1975     Years since quittin.9   • Smokeless tobacco: Never Used   Vaping Use   • Vaping Use: Never used   Substance and Sexual Activity   • Alcohol use: Yes     Alcohol/week: 4.2 oz     Types: 2 Glasses of wine, 5 Cans of beer per week     Comment: occ   • Drug  "use: No   • Sexual activity: Not Currently       SURGICAL HISTORY   has a past surgical history that includes removal spleen, total and removal of tonsils,<12 y/o.    CURRENT MEDICATIONS  Home Medications     Reviewed by Martine Cordoba PhT (Pharmacy Tech) on 10/19/21 at 0239  Med List Status: Complete   Medication Last Dose Status   acetaminophen (TYLENOL 8 HOUR ARTHRITIS PAIN) 650 MG CR tablet 10/18/2021 Active   albuterol 108 (90 Base) MCG/ACT Aero Soln inhalation aerosol PRN Active   allopurinol (ZYLOPRIM) 300 MG Tab 10/18/2021 Active   atorvastatin (LIPITOR) 40 MG Tab UNKNOWN Active   cefdinir (OMNICEF) 300 MG Cap 10/18/2021 Active   Cholecalciferol (VITAMIN D3) 2000 UNIT Cap 10/18/2021 Active   enalapril (VASOTEC) 10 MG Tab 10/18/2021 Active   ferrous sulfate 325 (65 Fe) MG tablet 10/18/2021 Active   flecainide (TAMBOCOR) 50 MG tablet 10/18/2021 Active   oxymetazoline, icn,  0.025% (AFRIN) PRN Active                ALLERGIES  Allergies   Allergen Reactions   • Pcn [Penicillins] Hives       PHYSICAL EXAM  VITAL SIGNS: /65   Pulse 80   Temp 36.6 °C (97.9 °F)   Resp 18   Ht 1.778 m (5' 10\")   Wt 79.4 kg (175 lb)   SpO2 96%   BMI 25.11 kg/m²  @OMOKIE[[id number]::@   Pulse ox interpretation: I interpret this pulse ox as normal.  Constitutional: Alert in no apparent distress.  HENT: No signs of trauma, Bilateral external ears normal, Nose normal.   Eyes: Pupils are equal and reactive, pale conj.   Neck: Normal range of motion, No tenderness, Supple  Cardiovascular: Regular rate and rhythm, no murmurs.   Thorax & Lungs: Normal breath sounds, No respiratory distress, No wheezing, No chest tenderness.   Abdomen: Bowel sounds normal, Soft, No tenderness  Skin: Warm, Dry,pale  Extremities: Intact distal pulses   Musculoskeletal: Good range of motion in all major joints. No tenderness to palpation or major deformities noted.   Neurologic: Alert , Normal motor function, Normal sensory function, No focal deficits " noted.   Psychiatric: Affect normal, Judgment normal, Mood normal.       DIAGNOSTIC STUDIES / PROCEDURES      LABS  Results for orders placed or performed during the hospital encounter of 10/18/21   CBC WITH DIFFERENTIAL   Result Value Ref Range    WBC 44.9 (HH) 4.8 - 10.8 K/uL    RBC 2.07 (L) 4.70 - 6.10 M/uL    Hemoglobin 7.4 (L) 14.0 - 18.0 g/dL    Hematocrit 23.1 (L) 42.0 - 52.0 %    .6 (H) 81.4 - 97.8 fL    MCH 35.7 (H) 27.0 - 33.0 pg    MCHC 32.0 (L) 33.7 - 35.3 g/dL    .0 (H) 35.9 - 50.0 fL    Platelet Count 77 (L) 164 - 446 K/uL    Neutrophils-Polys 7.20 (L) 44.00 - 72.00 %    Lymphocytes 25.90 22.00 - 41.00 %    Monocytes 7.10 0.00 - 13.40 %    Eosinophils 0.00 0.00 - 6.90 %    Basophils 0.00 0.00 - 1.80 %    Nucleated RBC 10.60 /100 WBC    Neutrophils (Absolute) 3.23 1.82 - 7.42 K/uL    Lymphs (Absolute) 11.63 (H) 1.00 - 4.80 K/uL    Monos (Absolute) 3.19 (H) 0.00 - 0.85 K/uL    Eos (Absolute) 0.00 0.00 - 0.51 K/uL    Baso (Absolute) 0.00 0.00 - 0.12 K/uL    NRBC (Absolute) 4.77 K/uL    Hypochromia 2+ (A)     Anisocytosis 2+ (A)     Macrocytosis 3+ (A)     Microcytosis 1+    COMP METABOLIC PANEL   Result Value Ref Range    Sodium 131 (L) 135 - 145 mmol/L    Potassium 4.9 3.6 - 5.5 mmol/L    Chloride 100 96 - 112 mmol/L    Co2 21 20 - 33 mmol/L    Anion Gap 10.0 7.0 - 16.0    Glucose 105 (H) 65 - 99 mg/dL    Bun 24 (H) 8 - 22 mg/dL    Creatinine 0.58 0.50 - 1.40 mg/dL    Calcium 8.1 (L) 8.5 - 10.5 mg/dL    AST(SGOT) 47 (H) 12 - 45 U/L    ALT(SGPT) 21 2 - 50 U/L    Alkaline Phosphatase 138 (H) 30 - 99 U/L    Total Bilirubin 0.7 0.1 - 1.5 mg/dL    Albumin 3.1 (L) 3.2 - 4.9 g/dL    Total Protein 6.3 6.0 - 8.2 g/dL    Globulin 3.2 1.9 - 3.5 g/dL    A-G Ratio 1.0 g/dL   PROTHROMBIN TIME   Result Value Ref Range    PT 15.7 (H) 12.0 - 14.6 sec    INR 1.29 (H) 0.87 - 1.13   APTT   Result Value Ref Range    APTT 37.4 (H) 24.7 - 36.0 sec   COD (ADULT)   Result Value Ref Range    ABO Grouping Only UNDET  (A)     Rh Grouping Only POS     Antibody Screen-Cod NEG     Component R       R99                 Red Cells, LR       [id number]   issued       10/19/21   01:35      Product Type R99     Dispense Status issued     Unit Number (Barcoded) [id number]     Product Code (Barcoded) B2237J53     Blood Type (Barcoded) 5100     Component R       R99                 Red Cells, LR       [id number]   selected     10/19/21   00:28      Product Type R99     Dispense Status selected     Unit Number (Barcoded) [id number]     Product Code (Barcoded) Q7456P87     Blood Type (Barcoded) 5100    LDH   Result Value Ref Range    LDH Total 404 (H) 107 - 266 U/L   ABO Rh Confirm   Result Value Ref Range    ABO Rh Confirm UNDET POS    ESTIMATED GFR   Result Value Ref Range    GFR If African American >60 >60 mL/min/1.73 m 2    GFR If Non African American >60 >60 mL/min/1.73 m 2   Magnesium   Result Value Ref Range    Magnesium 1.8 1.5 - 2.5 mg/dL   DIFFERENTIAL MANUAL   Result Value Ref Range    Other 59.80 %    Manual Diff Status PERFORMED    PERIPHERAL SMEAR REVIEW   Result Value Ref Range    Peripheral Smear Review see below    PLATELET ESTIMATE   Result Value Ref Range    Plt Estimation Decreased    MORPHOLOGY   Result Value Ref Range    RBC Morphology Present     Polychromia 1+     Poikilocytosis 2+     Schistocytes 1+     Stomatocytes 2+     Reactive Lymphocytes Moderate          RADIOLOGY  No orders to display           COURSE & MEDICAL DECISION MAKING  Pertinent Labs & Imaging studies reviewed. (See chart for details)  76-year-old male presented emergency department for treatment of worsening anemia as well as oncologic workup for possible leukemia as was primary care physicians concern. Oncology has already been consulted as an outpatient. Initial one unit. BCs has been ordered. I will have the patient brought into the hospital service for further oncology consultation in the morning.        FINAL IMPRESSION  1. Anemia,  unspecified type    2. Leukocytosis, unspecified type    3. Thrombocytopenia (HCC)            Electronically signed by: Ernesto Renae M.D., 10/19/2021 12:03 AM

## 2021-10-19 NOTE — PROCEDURES
"Bone Marrow Biopsy/Aspiration    Date/Time: 10/19/2021 1:31 PM  Performed by: Arthur Caruso M.D.  Authorized by: Arthur Caruso M.D.     Consent:     Consent obtained:  Written    Consent given by:  Patient    Risks discussed:  Bleeding, pain and repeat procedure    Alternatives discussed:  No treatment  Universal protocol:     Procedure explained and questions answered to patient or proxy's satisfaction: yes      Relevant documents present and verified: yes      Test results available and properly labeled: yes      Imaging studies available: yes      Required blood products, implants, devices, and special equipment available: yes      Immediately prior to procedure a time out was called: yes      Site/side marked: yes      Patient identity confirmed:  Verbally with patient and hospital-assigned identification number  Pre-procedure details:     Procedure type:  Aspiration and biopsy    Requesting physician:  Silvio    Indications:  Leukocytosis with blasts concerning for acute leukemia    Position:  Prone    Buttock laterality:  Left and right    Local anesthetic:  1% Lidocaine    Subcutaneous volume:  1 mL    Periosteum anesthetic volume:  4 mL    Preparation: Patient was prepped and draped in usual sterile fashion    Sedation:     Patient Sedated: Yes      Sedation type: moderate (conscious) sedation      Sedation:  Midazolam    Analgesia:  Fentanyl    Sedation length:  15 minutes  Procedure details:     Aspirate obtained:  5 mL followed by 5 mL    Biopsy performed:  2 cores    Number of attempts:  4+  Post-procedure:     Puncture site:  Adhesive bandage applied    Patient tolerance of procedure:  Tolerated well, no immediate complications  Comments:      Conscious sedation was initiated at 13:01 and the procedure finished at 13:17  He received 1 mg IV versed and 50 mcg IV fentanyl for conscious sedation  Due to numerous \"dry taps\" on the left side, the right side was then used for aspiration and another " marrow.

## 2021-10-19 NOTE — ED TRIAGE NOTES
Chief Complaint   Patient presents with   • Abnormal Labs     sent here by PCP Dr. Moreno for low H/H and needs a blood transfusion. Told that Dr. Flores and/or Dr. Anguiano to see patient for possible dx of leukemia.

## 2021-10-19 NOTE — ASSESSMENT & PLAN NOTE
Monitor with serial hemoglobins, transfuse if hemoglobin is less than 7  10/21 Transfused; daily CBC w diff.   10/30:  Hgb 7.1.  Transfuse if needed.   11/1:  Hgb 6.2.  Transfusion ordered.   11/3: Hgb 6.6. Transfusion ordered   11/4: H/H stable.

## 2021-10-19 NOTE — H&P
[facility] Medicine History & Physical Note    Date of Service  10/19/2021    Primary Care Physician  Lance Greco M.D.    Consultants  Consult Oncology in AM    Code Status  Full Code    Chief Complaint  Chief Complaint   Patient presents with   • Abnormal Labs     sent here by PCP Dr. Moreno for low H/H and needs a blood transfusion. Told that Dr. Flores and/or Dr. Anguiano to see patient for possible dx of leukemia.        History of Presenting Illness  76M sent to ED by his PCP for abnormal Platelet labs as well as elevated wbc count with associated recurrent epistaxis and anemia, and patients' PCP felt he should go to the ER for blood transfusion and Oncology evaluation for possible Leukemia. Patient admits to feeling tired and notes paleness. He states the fatigue has been going on for more than a week as well as the epistaxis.     On admission, wbc 44.9, Hgb 7.4 (before transfusion of 1 unit PRBC by ED), INR 1.29.    Will admit for bone marrow biopsy for suspected leukemia and anemia requiring blood transfusion.    I discussed the plan of care with patient.    Review of Systems  Review of Systems   Constitutional: Negative for chills, diaphoresis, fever and weight loss.   HENT: Positive for congestion. Negative for ear discharge, ear pain and sore throat.    Eyes: Negative for blurred vision, double vision and pain.   Respiratory: Negative for cough, hemoptysis, sputum production and shortness of breath.    Cardiovascular: Negative for chest pain and palpitations.   Gastrointestinal: Negative for abdominal pain, heartburn, nausea and vomiting.   Musculoskeletal: Negative for myalgias.   Skin: Negative for rash.   Neurological: Negative for tingling, tremors and seizures.   Psychiatric/Behavioral: Negative for substance abuse. The patient is not nervous/anxious.    All other systems reviewed and are negative.    All systems reviewed and negative except as noted in HPI.    Past Medical History   has a past  medical history of Gout, Gout, and Hypertension.    Surgical History   has a past surgical history that includes pr removal spleen, total and pr removal of tonsils,<10 y/o.     Family History  family history includes Cancer in his maternal grandfather; Diabetes in his father; No Known Problems in his maternal grandmother, paternal grandfather, and paternal grandmother; Other in his sister.   Family history reviewed with patient. There is family history that is pertinent to the chief complaint.     Social History   reports that he quit smoking about 45 years ago. His smoking use included cigarettes. He has a 2.50 pack-year smoking history. He has never used smokeless tobacco. He reports current alcohol use of about 4.2 oz of alcohol per week. He reports that he does not use drugs.    Allergies  Allergies   Allergen Reactions   • Pcn [Penicillins] Hives       Medications  Prior to Admission Medications   Prescriptions Last Dose Informant Patient Reported? Taking?   Cholecalciferol (VITAMIN D3) 2000 UNIT Cap 10/18/2021 at Unknown time Patient Yes Yes   Sig: Take 1 Capsule by mouth every day.   acetaminophen (TYLENOL 8 HOUR ARTHRITIS PAIN) 650 MG CR tablet 10/18/2021 at AFTERNOON Patient Yes Yes   Sig: Take 650 mg by mouth every 6 hours.   albuterol 108 (90 Base) MCG/ACT Aero Soln inhalation aerosol PRN at PRN Patient Yes No   Sig: albuterol sulfate HFA 90 mcg/actuation aerosol inhaler   INHALE 1 PUFF BY MOUTH EVERY 8 HOURS AS NEEDED FOR WHEEZING   allopurinol (ZYLOPRIM) 300 MG Tab 10/18/2021 at Unknown time Patient No No   Sig: TAKE 1 TABLET BY MOUTH EVERY DAY. NEED LAB WORK   atorvastatin (LIPITOR) 40 MG Tab UNKNOWN at UNKNOWN Patient Yes No   Sig: Take 40 mg by mouth every day.   cefdinir (OMNICEF) 300 MG Cap 10/18/2021 at 0900 Patient Yes Yes   Sig: Take 300 mg by mouth 2 times a day.   enalapril (VASOTEC) 10 MG Tab 10/18/2021 at Unknown time Patient No No   Sig: TAKE 1 TABLET BY MOUTH EVERY DAY. NEEDS LABS    ferrous sulfate 325 (65 Fe) MG tablet 10/18/2021 at Unknown time Patient Yes Yes   Sig: Take 325 mg by mouth every day.   flecainide (TAMBOCOR) 50 MG tablet 10/18/2021 at AM Patient Yes No   Sig: Take 50 mg by mouth 2 times a day.   oxymetazoline, icn,  0.025% (AFRIN) PRN at PRN Patient No No   Sig: Administer 1 Spray into affected nostril(S) every 12 hours. Prn nose bleed      Facility-Administered Medications: None       Physical Exam  Temp:  [36.2 °C (97.1 °F)-36.8 °C (98.3 °F)] 36.6 °C (97.9 °F)  Pulse:  [67-92] 80  Resp:  [16-18] 18  BP: (100-124)/(50-65) 124/65  SpO2:  [93 %-96 %] 96 %  Blood Pressure : 124/65   Temperature: 36.6 °C (97.9 °F)   Pulse: 80   Respiration: 18   Pulse Oximetry: 96 %       Physical Exam  Constitutional:       Appearance: Normal appearance.   HENT:      Head: Normocephalic and atraumatic.      Right Ear: External ear normal.      Left Ear: External ear normal.      Nose: Nose normal.      Mouth/Throat:      Mouth: Mucous membranes are moist.      Pharynx: No oropharyngeal exudate or posterior oropharyngeal erythema.   Eyes:      Extraocular Movements: Extraocular movements intact.      Pupils: Pupils are equal, round, and reactive to light.   Cardiovascular:      Rate and Rhythm: Normal rate and regular rhythm.      Pulses: Normal pulses.   Pulmonary:      Effort: Pulmonary effort is normal.      Breath sounds: Normal breath sounds.   Abdominal:      General: Abdomen is flat.      Palpations: Abdomen is soft.   Musculoskeletal:         General: Normal range of motion.      Cervical back: Normal range of motion and neck supple.   Skin:     General: Skin is warm and dry.   Neurological:      Mental Status: He is alert and oriented to person, place, and time.      Coordination: Coordination normal.      Gait: Gait normal.         Laboratory:  Recent Labs     10/18/21  2203   WBC 44.9*   RBC 2.07*   HEMOGLOBIN 7.4*   HEMATOCRIT 23.1*   .6*   MCH 35.7*   MCHC 32.0*   .0*    PLATELETCT 77*     Recent Labs     10/18/21  2203   SODIUM 131*   POTASSIUM 4.9   CHLORIDE 100   CO2 21   GLUCOSE 105*   BUN 24*   CREATININE 0.58   CALCIUM 8.1*     Recent Labs     10/18/21  2203   ALTSGPT 21   ASTSGOT 47*   ALKPHOSPHAT 138*   TBILIRUBIN 0.7   GLUCOSE 105*     Recent Labs     10/18/21  2203   APTT 37.4*   INR 1.29*     No results for input(s): NTPROBNP in the last 72 hours.      No results for input(s): TROPONINT in the last 72 hours.    Imaging:  No orders to display       no X-Ray or EKG requiring interpretation    Assessment/Plan:  I anticipate this patient is appropriate for observation status at this time.    Thrombocytopenia (HCC)  Assessment & Plan  Not currently bleeding, no epistaxis  tsfuse platelets prn  Likely secondary to marrow dysfunction    Leukocytosis- (present on admission)  Assessment & Plan  Likely secondary to Leukemia  Bone marrow biopsy, f/u with oncology    Anemia- (present on admission)  Assessment & Plan  Suspected marrow process such as Leukemia  Bone Marrow Biopsy ordered  Consider Oncology consult once biopsy read  Transfuse platelets and other blood products prn    Essential hypertension, benign- (present on admission)  Assessment & Plan  Continue Home Medications  Labetalol ivp prn with parameters        VTE prophylaxis: SCDs/TEDs and pharmacologic prophylaxis contraindicated due to epistaxis.

## 2021-10-19 NOTE — ED NOTES
Received call from the lab regarding critical WBC's of 44.9. Read back and verified. MD made aware

## 2021-10-19 NOTE — ASSESSMENT & PLAN NOTE
Patient follows up with Dr. Low  Currently had a cardioversion in September 2021  As per cardiology no longer on anticoagulation  Patient to continue with flecainide  10/21: EKG - SR

## 2021-10-19 NOTE — PROGRESS NOTES
This is a 76 year old male with PMHx of atrial fibrillation (s/p cardioversion, off AC, Dr. Low), hypertension, hyperlipidemia, and gout who was admitted on 10/18/2021 due to abnormal labs.    Patient was seen by cardiology, Dr. Low, patient had a cardioversion in September 2021, patient returned to [facility], patient taken off of Eliquis due to recurrent nosebleeds.  Patient is currently on flecainide.     Patient had 4-5 ER visits due to this recurrent epistaxis, requiring packing, cauterization, and surgical procedure performed by ENT.  Patient is currently off of any anticoagulation.    Patient was sent in by PCP due to findings of leukocytosis, anemia, and thrombocytopenia on repeat labs.     Labs on admission noted hemoglobin of 7.4, patient was transfused 1 unit, hemoglobin remained at 7.3.  We will continue with serial hemoglobins and transfuse if less than 7.    Oncology was consulted, they do recommend inpatient bone marrow biopsy, this was performed today, by my colleague, Dr. Arthur Caruso.  Oncology following.    Continue with serial hemoglobins, transfuse if hemoglobin less than 7.  Pending further oncology recommendations.    Lilli Farmer DO

## 2021-10-19 NOTE — ED NOTES
Patient calm and in room at this time. Respirations even and unlabored. No pain or distress reported. Pt denies any needs. He continues to await bed placement

## 2021-10-19 NOTE — ED NOTES
Patient remains calm and in room. Respirations even and unlabored. No pain or distress noted. No bleeding episodes noted from patient. Pt to be admitted. He continues to await bed placement.

## 2021-10-19 NOTE — ED NOTES
Med Rec complete per Pt at bedside with list provided.  Allergies reviewed.  Pt is currently taking Cefdinir 300mg BID.

## 2021-10-19 NOTE — PROGRESS NOTES
Mr. Gaspar will be scheduled for a bone marrow biopsy under conscious sedation approximately noon today. Please keep NPO.

## 2021-10-19 NOTE — OR NURSING
1345 - Received patient from OR. Report from Anesthesiologist and OR RN. Patient on 2lpm at 96 % O2. VSS. Monitor connected. No airway in place. S/P bone marrow biopsy    1350 - Pt denies any pain or nausea; tolerating sips of water; placed pt on RA    1405 - Pt desatting to 87 while sleeping, placed pt back on 2lpm via NC    1415 - VSS, phase I complete, awaiting placement on T7    1455 - Report called to T7 Clementine LUCERO    1505 - Pt transported to T731 via [facility], attached to tele monitor, accompanied by RN & pts spouse; all personal belongings sent with pt

## 2021-10-20 PROBLEM — E87.1 HYPONATREMIA: Status: ACTIVE | Noted: 2021-10-20

## 2021-10-20 PROBLEM — R74.02 ELEVATED LDH: Status: ACTIVE | Noted: 2021-10-20

## 2021-10-20 LAB
ALBUMIN SERPL BCP-MCNC: 2.7 G/DL (ref 3.2–4.9)
ALBUMIN/GLOB SERPL: 1 G/DL
ALP SERPL-CCNC: 115 U/L (ref 30–99)
ALT SERPL-CCNC: 18 U/L (ref 2–50)
ANION GAP SERPL CALC-SCNC: 7 MMOL/L (ref 7–16)
ANISOCYTOSIS BLD QL SMEAR: ABNORMAL
AST SERPL-CCNC: 41 U/L (ref 12–45)
BASOPHILS # BLD AUTO: 0 % (ref 0–1.8)
BASOPHILS # BLD: 0 K/UL (ref 0–0.12)
BILIRUB SERPL-MCNC: 0.6 MG/DL (ref 0.1–1.5)
BLASTS NFR BLD MANUAL: 72.7 %
BUN SERPL-MCNC: 18 MG/DL (ref 8–22)
CALCIUM SERPL-MCNC: 7.8 MG/DL (ref 8.5–10.5)
CHLORIDE SERPL-SCNC: 104 MMOL/L (ref 96–112)
CHOLEST SERPL-MCNC: 65 MG/DL (ref 100–199)
CO2 SERPL-SCNC: 22 MMOL/L (ref 20–33)
CREAT SERPL-MCNC: 0.42 MG/DL (ref 0.5–1.4)
EOSINOPHIL # BLD AUTO: 0 K/UL (ref 0–0.51)
EOSINOPHIL NFR BLD: 0 % (ref 0–6.9)
ERYTHROCYTE [DISTWIDTH] IN BLOOD BY AUTOMATED COUNT: 106 FL (ref 35.9–50)
GLOBULIN SER CALC-MCNC: 2.7 G/DL (ref 1.9–3.5)
GLUCOSE SERPL-MCNC: 82 MG/DL (ref 65–99)
HAV IGM SERPL QL IA: NORMAL
HBV CORE IGM SER QL: NORMAL
HBV SURFACE AG SER QL: NORMAL
HCT VFR BLD AUTO: 22.5 % (ref 42–52)
HCV AB SER QL: NORMAL
HDLC SERPL-MCNC: 19 MG/DL
HGB BLD-MCNC: 7.3 G/DL (ref 14–18)
HIV 1+2 AB+HIV1 P24 AG SERPL QL IA: NORMAL
LDH SERPL L TO P-CCNC: 347 U/L (ref 107–266)
LDLC SERPL CALC-MCNC: 27 MG/DL
LYMPHOCYTES # BLD AUTO: 7.79 K/UL (ref 1–4.8)
LYMPHOCYTES NFR BLD: 17.9 % (ref 22–41)
MACROCYTES BLD QL SMEAR: ABNORMAL
MAGNESIUM SERPL-MCNC: 1.8 MG/DL (ref 1.5–2.5)
MANUAL DIFF BLD: NORMAL
MCH RBC QN AUTO: 34.9 PG (ref 27–33)
MCHC RBC AUTO-ENTMCNC: 32.4 G/DL (ref 33.7–35.3)
MCV RBC AUTO: 107.7 FL (ref 81.4–97.8)
MICROCYTES BLD QL SMEAR: ABNORMAL
MONOCYTES # BLD AUTO: 0.74 K/UL (ref 0–0.85)
MONOCYTES NFR BLD AUTO: 1.7 % (ref 0–13.4)
MORPHOLOGY BLD-IMP: NORMAL
MYELOCYTES NFR BLD MANUAL: 0.9 %
NEUTROPHILS # BLD AUTO: 2.96 K/UL (ref 1.82–7.42)
NEUTROPHILS NFR BLD: 6.8 % (ref 44–72)
NRBC # BLD AUTO: 4.03 K/UL
NRBC BLD-RTO: 9.3 /100 WBC
PLATELET # BLD AUTO: 50 K/UL (ref 164–446)
PLATELET BLD QL SMEAR: NORMAL
POTASSIUM SERPL-SCNC: 4.2 MMOL/L (ref 3.6–5.5)
PROT SERPL-MCNC: 5.4 G/DL (ref 6–8.2)
RBC # BLD AUTO: 2.09 M/UL (ref 4.7–6.1)
RBC BLD AUTO: PRESENT
SODIUM SERPL-SCNC: 133 MMOL/L (ref 135–145)
TRIGL SERPL-MCNC: 94 MG/DL (ref 0–149)
URATE SERPL-MCNC: 4.1 MG/DL (ref 2.5–8.3)
WBC # BLD AUTO: 43.5 K/UL (ref 4.8–10.8)

## 2021-10-20 PROCEDURE — 87389 HIV-1 AG W/HIV-1&-2 AB AG IA: CPT

## 2021-10-20 PROCEDURE — 83735 ASSAY OF MAGNESIUM: CPT

## 2021-10-20 PROCEDURE — 83615 LACTATE (LD) (LDH) ENZYME: CPT

## 2021-10-20 PROCEDURE — 700102 HCHG RX REV CODE 250 W/ 637 OVERRIDE(OP): Performed by: GENERAL PRACTICE

## 2021-10-20 PROCEDURE — 84550 ASSAY OF BLOOD/URIC ACID: CPT

## 2021-10-20 PROCEDURE — 86694 HERPES SIMPLEX NES ANTBDY: CPT

## 2021-10-20 PROCEDURE — 86695 HERPES SIMPLEX TYPE 1 TEST: CPT

## 2021-10-20 PROCEDURE — 80053 COMPREHEN METABOLIC PANEL: CPT

## 2021-10-20 PROCEDURE — 770004 HCHG ROOM/CARE - ONCOLOGY PRIVATE *

## 2021-10-20 PROCEDURE — 36415 COLL VENOUS BLD VENIPUNCTURE: CPT

## 2021-10-20 PROCEDURE — 86644 CMV ANTIBODY: CPT

## 2021-10-20 PROCEDURE — 86696 HERPES SIMPLEX TYPE 2 TEST: CPT

## 2021-10-20 PROCEDURE — 86645 CMV ANTIBODY IGM: CPT

## 2021-10-20 PROCEDURE — 85007 BL SMEAR W/DIFF WBC COUNT: CPT

## 2021-10-20 PROCEDURE — 700102 HCHG RX REV CODE 250 W/ 637 OVERRIDE(OP): Performed by: STUDENT IN AN ORGANIZED HEALTH CARE EDUCATION/TRAINING PROGRAM

## 2021-10-20 PROCEDURE — 80061 LIPID PANEL: CPT

## 2021-10-20 PROCEDURE — 99232 SBSQ HOSP IP/OBS MODERATE 35: CPT | Performed by: INTERNAL MEDICINE

## 2021-10-20 PROCEDURE — 85027 COMPLETE CBC AUTOMATED: CPT

## 2021-10-20 PROCEDURE — 80074 ACUTE HEPATITIS PANEL: CPT

## 2021-10-20 PROCEDURE — A9270 NON-COVERED ITEM OR SERVICE: HCPCS | Performed by: GENERAL PRACTICE

## 2021-10-20 PROCEDURE — A9270 NON-COVERED ITEM OR SERVICE: HCPCS | Performed by: STUDENT IN AN ORGANIZED HEALTH CARE EDUCATION/TRAINING PROGRAM

## 2021-10-20 PROCEDURE — A9270 NON-COVERED ITEM OR SERVICE: HCPCS | Performed by: INTERNAL MEDICINE

## 2021-10-20 PROCEDURE — 700102 HCHG RX REV CODE 250 W/ 637 OVERRIDE(OP): Performed by: INTERNAL MEDICINE

## 2021-10-20 PROCEDURE — 94760 N-INVAS EAR/PLS OXIMETRY 1: CPT

## 2021-10-20 RX ORDER — IPRATROPIUM BROMIDE AND ALBUTEROL SULFATE 2.5; .5 MG/3ML; MG/3ML
3 SOLUTION RESPIRATORY (INHALATION)
Status: DISCONTINUED | OUTPATIENT
Start: 2021-10-20 | End: 2021-10-28 | Stop reason: ALTCHOICE

## 2021-10-20 RX ADMIN — DOCUSATE SODIUM 50 MG AND SENNOSIDES 8.6 MG 2 TABLET: 8.6; 5 TABLET, FILM COATED ORAL at 17:12

## 2021-10-20 RX ADMIN — FLECAINIDE ACETATE 50 MG: 50 TABLET ORAL at 17:12

## 2021-10-20 RX ADMIN — ENALAPRIL MALEATE 10 MG: 10 TABLET ORAL at 06:17

## 2021-10-20 RX ADMIN — FLECAINIDE ACETATE 50 MG: 50 TABLET ORAL at 06:17

## 2021-10-20 RX ADMIN — TRAZODONE HYDROCHLORIDE 50 MG: 100 TABLET ORAL at 22:34

## 2021-10-20 RX ADMIN — DOCUSATE SODIUM 50 MG AND SENNOSIDES 8.6 MG 2 TABLET: 8.6; 5 TABLET, FILM COATED ORAL at 06:17

## 2021-10-20 RX ADMIN — ALLOPURINOL 300 MG: 300 TABLET ORAL at 06:17

## 2021-10-20 ASSESSMENT — ENCOUNTER SYMPTOMS
HEMOPTYSIS: 0
EYE REDNESS: 0
FEVER: 0
FALLS: 0
HEADACHES: 0
SHORTNESS OF BREATH: 0
DIZZINESS: 0
EYE PAIN: 0
WEIGHT LOSS: 0
ABDOMINAL PAIN: 0
VOMITING: 0
BLURRED VISION: 0
NAUSEA: 0
LOSS OF CONSCIOUSNESS: 0
CHILLS: 0
SEIZURES: 0
COUGH: 0
BRUISES/BLEEDS EASILY: 0
POLYDIPSIA: 0
SORE THROAT: 0
PALPITATIONS: 0

## 2021-10-20 ASSESSMENT — COPD QUESTIONNAIRES
HAVE YOU SMOKED AT LEAST 100 CIGARETTES IN YOUR ENTIRE LIFE: YES
DURING THE PAST 4 WEEKS HOW MUCH DID YOU FEEL SHORT OF BREATH: SOME OF THE TIME
COPD SCREENING SCORE: 6
DO YOU EVER COUGH UP ANY MUCUS OR PHLEGM?: NO/ONLY WITH OCCASIONAL COLDS OR INFECTIONS

## 2021-10-20 ASSESSMENT — PAIN DESCRIPTION - PAIN TYPE: TYPE: ACUTE PAIN

## 2021-10-20 NOTE — PROGRESS NOTES
Assumed care this pm from day shift RN. Patient sitting up in bed with no signs of distress. Patient AxO 4, O2 @ RA, VSS. Call bell and personal items within reach, bed locked in low position. Bed exit alarm on. Hourly rounding in place. Tele box in place, monitor room notified.

## 2021-10-20 NOTE — CONSULTS
HEMATOLOGY-ONCOLOGY CONSULT NOTE    Date of Service: 10/19/2021    Reason for Consult: Notable leukocytosis    HPI: Patient is a 76 year old male with a PMH of COVID infection (7/2021), OA w/ b/l knee replacement, A-fib s/p cardioversion and no anticoagulation, splenectomy and gout who presented to the hospital after he was found to have notably elevated WBC.    Reports that he had a Covid infection back in July of this year and ever since then has been experiencing more fatigue. Patient reports that over the past 2 months he is started experiencing night sweats where he woke up drenched in sweat along with recurrent episodes of epistaxis requiring blood transfusions and cauterization. Patient also reports having bleeding from his gums during this time. Patient is unsure of weight loss but he thinks he might have had some weight loss due to poor appetite recently.    Patient denies any notable family history of cancer. Reports that his sister had lung cancer but was a smoker. Patient reports that he smoked until age 34 for combined pack-year history of 7.5 years. Denies any history of heavy alcohol use or drug use. He says that he used [place] roughly once per year on his lawn, otherwise cannot think of any other exposure to toxic chemicals. 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    Patient was found to have a white count of 44.9 admission along with hemoglobin level of 7.4 and platelet level of 77. He underwent a bone marrow biopsy this afternoon.    Subjective: She denies any acute complaints overnight. He is anxious to start his work-up and forgot exactly what is going on. Discussed possibilities of leukemia with patient and possible next steps if diagnosis is confirmed on bone marrow biopsy. Patient's wife was present during discussion. Patient's wife and patient expressed understanding of current health status.    Objective:  Medications reviewed and notable for:  Current Facility-Administered  "Medications   Medication Dose   • enalapril (VASOTEC) tablet 10 mg  10 mg   • senna-docusate (PERICOLACE or SENOKOT S) 8.6-50 MG per tablet 2 Tablet  2 Tablet    And   • polyethylene glycol/lytes (MIRALAX) PACKET 1 Packet  1 Packet    And   • magnesium hydroxide (MILK OF MAGNESIA) suspension 30 mL  30 mL    And   • bisacodyl (DULCOLAX) suppository 10 mg  10 mg   • acetaminophen (Tylenol) tablet 650 mg  650 mg   • enalaprilat (Vasotec) injection 1.25 mg 1 mL  1.25 mg   • labetalol (NORMODYNE/TRANDATE) injection 10 mg  10 mg   • ondansetron (ZOFRAN) syringe/vial injection 4 mg  4 mg   • ondansetron (ZOFRAN ODT) dispertab 4 mg  4 mg   • glucose 4 g chewable tablet 16 g  16 g    And   • dextrose 50% (D50W) injection 50 mL  50 mL   • FENTANYL CITRATE (PF) 0.05 MG/ML INJ SOLN (WRAPPED)     • MIDAZOLAM HCL 2 MG/2ML INJ SOLN (WRAPPED)     • flecainide (TAMBOCOR) tablet 50 mg  50 mg   • influenza Vac High-Dose Quad (Fluzone) injection 0.7 mL  0.7 mL   • traZODone (DESYREL) tablet 50 mg  50 mg       ROS:   Constitutional: Reports fatigue and night sweats, but no fevers or chills. Reports recent weight loss.  Resp: No cough or SOB  Cardio:No chest pain or palpitations  Pschy: No depression or anxiety   Neuro: No headaches, no seizure, no vision changes  GI: no abdominal pain, nausea or vomiting. No diarrhea or constipation   All other ROS negative    /68   Pulse 90   Temp 37.2 °C (98.9 °F) (Oral)   Resp (!) 22   Ht 1.778 m (5' 10\")   Wt 79.4 kg (175 lb)   SpO2 91%     ECOG PS:  General:  comfortable, NAD  HEENT:  sclera anicteric, pupils equal, round, reactive to light, oral cavity and oropharynx clear, mucous membranes moist  Neck:   supple, mild cervical lymphadenopathy.  Cor:   regular rate and rhythm, no murmurs, rubs, or gallops  Pulm:   clear to auscultation bilaterally  Abd:   bowel sounds present, soft, nontender, nondistended, no palpable masses or organomegaly  Extremities:  warm, no lower extremity " edema  Neurologic:  A&O x 3  Pyschiatric:  Appropriate mood and affect    Labs reviewed and notable for:  Recent Labs     10/18/21  2203 10/19/21  1140   WBC 44.9*  --    RBC 2.07*  --    HEMOGLOBIN 7.4* 7.3*   HEMATOCRIT 23.1* 23.3*   .6*  --    MCH 35.7*  --    MCHC 32.0*  --    .0*  --    PLATELETCT 77*  --      Recent Labs     10/18/21  2203   APTT 37.4*   INR 1.29*     .@CMP  Recent Results (from the past 24 hour(s))   CBC WITH DIFFERENTIAL    Collection Time: 10/18/21 10:03 PM   Result Value Ref Range    WBC 44.9 (HH) 4.8 - 10.8 K/uL    RBC 2.07 (L) 4.70 - 6.10 M/uL    Hemoglobin 7.4 (L) 14.0 - 18.0 g/dL    Hematocrit 23.1 (L) 42.0 - 52.0 %    .6 (H) 81.4 - 97.8 fL    MCH 35.7 (H) 27.0 - 33.0 pg    MCHC 32.0 (L) 33.7 - 35.3 g/dL    .0 (H) 35.9 - 50.0 fL    Platelet Count 77 (L) 164 - 446 K/uL    Neutrophils-Polys 7.20 (L) 44.00 - 72.00 %    Lymphocytes 25.90 22.00 - 41.00 %    Monocytes 7.10 0.00 - 13.40 %    Eosinophils 0.00 0.00 - 6.90 %    Basophils 0.00 0.00 - 1.80 %    Nucleated RBC 10.60 /100 WBC    Neutrophils (Absolute) 3.23 1.82 - 7.42 K/uL    Lymphs (Absolute) 11.63 (H) 1.00 - 4.80 K/uL    Monos (Absolute) 3.19 (H) 0.00 - 0.85 K/uL    Eos (Absolute) 0.00 0.00 - 0.51 K/uL    Baso (Absolute) 0.00 0.00 - 0.12 K/uL    NRBC (Absolute) 4.77 K/uL    Hypochromia 2+ (A)     Anisocytosis 2+ (A)     Macrocytosis 3+ (A)     Microcytosis 1+    COMP METABOLIC PANEL    Collection Time: 10/18/21 10:03 PM   Result Value Ref Range    Sodium 131 (L) 135 - 145 mmol/L    Potassium 4.9 3.6 - 5.5 mmol/L    Chloride 100 96 - 112 mmol/L    Co2 21 20 - 33 mmol/L    Anion Gap 10.0 7.0 - 16.0    Glucose 105 (H) 65 - 99 mg/dL    Bun 24 (H) 8 - 22 mg/dL    Creatinine 0.58 0.50 - 1.40 mg/dL    Calcium 8.1 (L) 8.5 - 10.5 mg/dL    AST(SGOT) 47 (H) 12 - 45 U/L    ALT(SGPT) 21 2 - 50 U/L    Alkaline Phosphatase 138 (H) 30 - 99 U/L    Total Bilirubin 0.7 0.1 - 1.5 mg/dL    Albumin 3.1 (L) 3.2 - 4.9 g/dL     Total Protein 6.3 6.0 - 8.2 g/dL    Globulin 3.2 1.9 - 3.5 g/dL    A-G Ratio 1.0 g/dL   PROTHROMBIN TIME    Collection Time: 10/18/21 10:03 PM   Result Value Ref Range    PT 15.7 (H) 12.0 - 14.6 sec    INR 1.29 (H) 0.87 - 1.13   APTT    Collection Time: 10/18/21 10:03 PM   Result Value Ref Range    APTT 37.4 (H) 24.7 - 36.0 sec   COD (ADULT)    Collection Time: 10/18/21 10:03 PM   Result Value Ref Range    ABO Grouping Only UNDET (A)     Rh Grouping Only POS     Antibody Screen-Cod NEG     Component R       R99                 Red Cells, LR       [id number]   issued       10/19/21   01:35      Product Type R99     Dispense Status issued     Unit Number (Barcoded) [id number]     Product Code (Barcoded) Z6523A58     Blood Type (Barcoded) 5100     Component R       R99                 Red Cells, LR       [id number]   selected     10/19/21   00:28      Product Type R99     Dispense Status selected     Unit Number (Barcoded) [id number]     Product Code (Barcoded) A0929M57     Blood Type (Barcoded) 5100    LDH    Collection Time: 10/18/21 10:03 PM   Result Value Ref Range    LDH Total 404 (H) 107 - 266 U/L   ESTIMATED GFR    Collection Time: 10/18/21 10:03 PM   Result Value Ref Range    GFR If African American >60 >60 mL/min/1.73 m 2    GFR If Non African American >60 >60 mL/min/1.73 m 2   PATH INTERP HEME    Collection Time: 10/18/21 10:03 PM   Result Value Ref Range    Interpretation See Comment     Reviewed By Hematology see below    Magnesium    Collection Time: 10/18/21 10:03 PM   Result Value Ref Range    Magnesium 1.8 1.5 - 2.5 mg/dL   DIFFERENTIAL MANUAL    Collection Time: 10/18/21 10:03 PM   Result Value Ref Range    Other 59.80 %    Manual Diff Status PERFORMED    PERIPHERAL SMEAR REVIEW    Collection Time: 10/18/21 10:03 PM   Result Value Ref Range    Peripheral Smear Review see below    PLATELET ESTIMATE    Collection Time: 10/18/21 10:03 PM   Result Value Ref Range    Plt Estimation Decreased     MORPHOLOGY    Collection Time: 10/18/21 10:03 PM   Result Value Ref Range    RBC Morphology Present     Polychromia 1+     Poikilocytosis 2+     Schistocytes 1+     Stomatocytes 2+     Reactive Lymphocytes Moderate    ABO Rh Confirm    Collection Time: 10/19/21 12:21 AM   Result Value Ref Range    ABO Rh Confirm UNDET POS    HEMOGLOBIN AND HEMATOCRIT    Collection Time: 10/19/21 11:40 AM   Result Value Ref Range    Hemoglobin 7.3 (L) 14.0 - 18.0 g/dL    Hematocrit 23.3 (L) 42.0 - 52.0 %   RETICULOCYTES COUNT    Collection Time: 10/19/21 11:40 AM   Result Value Ref Range    Reticulocyte Count 5.6 (H) 0.8 - 2.1 %    Retic, Absolute 0.12 (H) 0.04 - 0.06 M/uL    Imm. Reticulocyte Fraction 37.6 (H) 9.3 - 17.4 %    Retic Hgb Equivalent 29.8 29.0 - 35.0 pg/cell   Histology Request    Collection Time: 10/19/21  1:29 PM   Result Value Ref Range    Pathology Request Sent to Histo        Diagnostic imaging:   No orders to display         Assessment and Recommendations: Patient is a 76 year old male with a PMH of COVID infection (7/2021), OA w/ b/l knee replacement, A-fib s/p cardioversion and no anticoagulation, splenectomy and gout who presented to the hospital after he was found to have notably elevated WBC. Patient was found to have a white count of 44.9 admission along with hemoglobin level of 7.4 and platelet level of 77.  This is all new from 3/2021.      I had a long discussion with him and his wife.  They understand that his peripheral smear is very concerning for an acute process.  We do not have his labs from [facility] but cbc was good back in March.  We will see what the bone marrow shows.  We need to see if reactive or true malignancy.  We will see if any MDS changes.  We can then make plans depending.  He understands with his age and other medical issues will have to see what he can tolerate depending on diagnosis.  He knows he could be here for a month if he decides on aggressive treatments.    Plan    -Repeat CBC,  B12/folate, and fibrinogen.  -Bone marrow biopsy results pending from today (they know it will take a few days)  -Consider starting Hydrea tomorrow depending on trend of blood counts.  -Continue IV fluid resuscitation.  -will start allopurinol  -Transfer to oncology floor when possible in anticipation of possible treatment.  -Continue to monitor patient's for possible transfusion needs (hemoglobin <7 and platelets <10k or bleeding, all products should be cmv neg, irradiated).       High complexity      Smith Anguiano MD  [facility]

## 2021-10-20 NOTE — CARE PLAN
The patient is Stable - Low risk of patient condition declining or worsening    Shift Goals  Clinical Goals: monitor CBC/ notify MD with results  Patient Goals: rest    Progress made toward(s) clinical / shift goals:    Problem: Knowledge Deficit - Standard  Goal: Patient and family/care givers will demonstrate understanding of plan of care, disease process/condition, diagnostic tests and medications  Outcome: Progressing  Note: Educated the patient about the plan of care. Patient asks appropriate questions. Patient voices that he understands that he has to wait for labs to come back to get an idea of the entire plan. All questions answered at this time.      Problem: Fall Risk  Goal: Patient will remain free from falls  Outcome: Progressing  Note: Patient calls appropriately        Patient is not progressing towards the following goals:

## 2021-10-20 NOTE — PROGRESS NOTES
Bedside report received and patient care assumed. Pt is resting in bed, A&O4, with no complaints of pain, and is on 2L. Tele box on. All fall precautions are in place, belongings at bedside table.  Pt was updated on POC, no questions or concerns. Pt educated on use of call light for assistance. Will continue to monitor.     Crisis Charting: COVID-19 surge in effect

## 2021-10-20 NOTE — PROGRESS NOTES
Reviewing orders. Patient had scheduled labs to be drawn at 1800 for CBC/ Fibrinogen/ folate. Labs had not been pulled yet.     @1945 phlebotomist called for clarification on labs.   @1958 phlebotomist at bedside to draw labs.   @ 2040 notified MD Smith Anguiano labs were pulled but not processed yet  @ 2050 called lab processing to run the CBC stat per MD  @ 2118 called lab about update on CBC   @ 2143 notified MD Smith Anguiano WBC 28.1and hgb 7.8

## 2021-10-20 NOTE — PROGRESS NOTES
HEMATOLOGY-ONCOLOGY PROGRESS NOTE    Date of Service: 10/20/2021     Reason for Consult: Notable leukocytosis     HPI: Patient is a 76 year old male with a PMH of COVID infection (7/2021), OA w/ b/l knee replacement, A-fib s/p cardioversion and no anticoagulation, splenectomy and gout who presented to the hospital after he was found to have notably elevated WBC.     Reports that he had a Covid infection back in July of this year and ever since then has been experiencing more fatigue. Patient reports that over the past 2 months he is started experiencing night sweats where he woke up drenched in sweat along with recurrent episodes of epistaxis requiring blood transfusions and cauterization. Patient also reports having bleeding from his gums during this time. Patient is unsure of weight loss but he thinks he might have had some weight loss due to poor appetite recently.     Patient denies any notable family history of cancer. Reports that his sister had lung cancer but was a smoker. Patient reports that he smoked until age 34 for combined pack-year history of 7.5 years. Denies any history of heavy alcohol use or drug use. He says that he used [place] roughly once per year on his lawn, otherwise cannot think of any other exposure to toxic chemicals. 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     Patient was found to have a white count of 44.9 admission along with hemoglobin level of 7.4 and platelet level of 77. He underwent a bone marrow biopsy after admission.    Events: Leukocytosis stable with platelets slightly down trending. Fibrinogen, HIV panel, Hep panel and uric acid wnl. B12 elevated.    Subjective: Patient denies any acute complaints overnight. He says that he is mentally preparing himself for all possibilities depending on the result of the bone marrow biopsy.    Objective:  Medications reviewed and notable for:  Current Facility-Administered Medications   Medication Dose   •  "ipratropium-albuterol (DUONEB) nebulizer solution  3 mL   • enalapril (VASOTEC) tablet 10 mg  10 mg   • senna-docusate (PERICOLACE or SENOKOT S) 8.6-50 MG per tablet 2 Tablet  2 Tablet    And   • polyethylene glycol/lytes (MIRALAX) PACKET 1 Packet  1 Packet    And   • magnesium hydroxide (MILK OF MAGNESIA) suspension 30 mL  30 mL    And   • bisacodyl (DULCOLAX) suppository 10 mg  10 mg   • acetaminophen (Tylenol) tablet 650 mg  650 mg   • enalaprilat (Vasotec) injection 1.25 mg 1 mL  1.25 mg   • labetalol (NORMODYNE/TRANDATE) injection 10 mg  10 mg   • ondansetron (ZOFRAN) syringe/vial injection 4 mg  4 mg   • ondansetron (ZOFRAN ODT) dispertab 4 mg  4 mg   • glucose 4 g chewable tablet 16 g  16 g    And   • dextrose 50% (D50W) injection 50 mL  50 mL   • flecainide (TAMBOCOR) tablet 50 mg  50 mg   • influenza Vac High-Dose Quad (Fluzone) injection 0.7 mL  0.7 mL   • traZODone (DESYREL) tablet 50 mg  50 mg   • allopurinol (ZYLOPRIM) tablet 300 mg  300 mg   • IPRATROPIUM-ALBUTEROL 0.5-2.5 (3) MG/3ML INH SOLN       Review of Systems   Constitutional: Negative for chills and fever.   HENT: Positive for hearing loss. Negative for ear pain and tinnitus.    Eyes: Negative for blurred vision.   Respiratory: Negative for cough, hemoptysis and shortness of breath.    Cardiovascular: Negative for chest pain and palpitations.   Skin: Negative for itching and rash.   Neurological: Negative for dizziness and headaches.   Endo/Heme/Allergies: Negative for polydipsia. Does not bruise/bleed easily.       /58   Pulse 85   Temp 36.7 °C (98 °F) (Temporal)   Resp 16   Ht 1.778 m (5' 10\")   Wt 77.9 kg (171 lb 11.8 oz)   SpO2 90%     Physical Exam  Constitutional:       General: He is not in acute distress.     Appearance: Normal appearance. He is not ill-appearing, toxic-appearing or diaphoretic.   HENT:      Head: Normocephalic and atraumatic.      Mouth/Throat:      Mouth: Mucous membranes are moist.      Pharynx: " Oropharynx is clear. No oropharyngeal exudate or posterior oropharyngeal erythema.   Eyes:      General: No scleral icterus.        Right eye: No discharge.         Left eye: No discharge.      Conjunctiva/sclera: Conjunctivae normal.   Cardiovascular:      Rate and Rhythm: Normal rate and regular rhythm.      Pulses: Normal pulses.      Heart sounds: Normal heart sounds. No murmur heard.   No gallop.    Pulmonary:      Effort: Pulmonary effort is normal. No respiratory distress.      Breath sounds: Normal breath sounds.   Chest:      Chest wall: No tenderness.   Abdominal:      General: Abdomen is flat. Bowel sounds are normal. There is no distension.      Palpations: Abdomen is soft. There is no mass.   Skin:     Coloration: Skin is not jaundiced or pale.   Neurological:      General: No focal deficit present.      Mental Status: He is alert and oriented to person, place, and time.   Psychiatric:         Mood and Affect: Mood normal.         Behavior: Behavior normal.         Labs reviewed and notable for:  Recent Labs     10/18/21  2203 10/18/21  2203 10/19/21  1140 10/19/21  1958 10/20/21  0317   WBC 44.9*  --   --  28.1* 43.5*   RBC 2.07*  --   --  2.22* 2.09*   HEMOGLOBIN 7.4*   < > 7.3* 7.8* 7.3*   HEMATOCRIT 23.1*   < > 23.3* 24.0* 22.5*   .6*  --   --  108.1* 107.7*   MCH 35.7*  --   --  35.1* 34.9*   MCHC 32.0*  --   --  32.5* 32.4*   .0*  --   --  109.1* 106.0*   PLATELETCT 77*  --   --  68* 50*    < > = values in this interval not displayed.     Recent Labs     10/18/21  2203   APTT 37.4*   INR 1.29*     .@CMP  Recent Results (from the past 24 hour(s))   HEMOGLOBIN AND HEMATOCRIT    Collection Time: 10/19/21 11:40 AM   Result Value Ref Range    Hemoglobin 7.3 (L) 14.0 - 18.0 g/dL    Hematocrit 23.3 (L) 42.0 - 52.0 %   RETICULOCYTES COUNT    Collection Time: 10/19/21 11:40 AM   Result Value Ref Range    Reticulocyte Count 5.6 (H) 0.8 - 2.1 %    Retic, Absolute 0.12 (H) 0.04 - 0.06 M/uL     Imm. Reticulocyte Fraction 37.6 (H) 9.3 - 17.4 %    Retic Hgb Equivalent 29.8 29.0 - 35.0 pg/cell   Histology Request    Collection Time: 10/19/21  1:29 PM   Result Value Ref Range    [facility]    VITAMIN B12    Collection Time: 10/19/21  7:58 PM   Result Value Ref Range    Vitamin B12 -True Cobalamin 1878 (H) 211 - 911 pg/mL   FIBRINOGEN    Collection Time: 10/19/21  7:58 PM   Result Value Ref Range    Fibrinogen 231 215 - 460 mg/dL   CBC WITH DIFFERENTIAL    Collection Time: 10/19/21  7:58 PM   Result Value Ref Range    WBC 28.1 (H) 4.8 - 10.8 K/uL    RBC 2.22 (L) 4.70 - 6.10 M/uL    Hemoglobin 7.8 (L) 14.0 - 18.0 g/dL    Hematocrit 24.0 (L) 42.0 - 52.0 %    .1 (H) 81.4 - 97.8 fL    MCH 35.1 (H) 27.0 - 33.0 pg    MCHC 32.5 (L) 33.7 - 35.3 g/dL    .1 (H) 35.9 - 50.0 fL    Platelet Count 68 (L) 164 - 446 K/uL    Neutrophils-Polys 4.80 (L) 44.00 - 72.00 %    Lymphocytes 49.50 (H) 22.00 - 41.00 %    Monocytes 1.90 0.00 - 13.40 %    Eosinophils 0.00 0.00 - 6.90 %    Basophils 0.00 0.00 - 1.80 %    Nucleated RBC 14.80 /100 WBC    Neutrophils (Absolute) 1.35 (L) 1.82 - 7.42 K/uL    Lymphs (Absolute) 13.91 (H) 1.00 - 4.80 K/uL    Monos (Absolute) 0.53 0.00 - 0.85 K/uL    Eos (Absolute) 0.00 0.00 - 0.51 K/uL    Baso (Absolute) 0.00 0.00 - 0.12 K/uL    NRBC (Absolute) 4.15 K/uL    Hypochromia 2+ (A)     Anisocytosis 2+ (A)     Macrocytosis 2+ (A)     Microcytosis 1+    FOLATE    Collection Time: 10/19/21  7:58 PM   Result Value Ref Range    Folate -Folic Acid 16.5 >4.0 ng/mL   PERIPHERAL SMEAR REVIEW    Collection Time: 10/19/21  7:58 PM   Result Value Ref Range    Peripheral Smear Review see below    PLATELET ESTIMATE    Collection Time: 10/19/21  7:58 PM   Result Value Ref Range    Plt Estimation Marked Decrease    MORPHOLOGY    Collection Time: 10/19/21  7:58 PM   Result Value Ref Range    RBC Morphology Present     Large Platelets 1+     Giant Platelets 1+     Polychromia 2+      Poikilocytosis 1+     Ovalocytes 1+     Target Cells 1+     Smudge Cells Moderate     Basophilic Stippling Moderate     Mckinnon-Jolly Bodies Moderate    DIFFERENTIAL MANUAL    Collection Time: 10/19/21  7:58 PM   Result Value Ref Range    Blasts 43.80 %    Manual Diff Status PERFORMED    Comp Metabolic Panel (CMP)    Collection Time: 10/20/21  3:16 AM   Result Value Ref Range    Sodium 133 (L) 135 - 145 mmol/L    Potassium 4.2 3.6 - 5.5 mmol/L    Chloride 104 96 - 112 mmol/L    Co2 22 20 - 33 mmol/L    Anion Gap 7.0 7.0 - 16.0    Glucose 82 65 - 99 mg/dL    Bun 18 8 - 22 mg/dL    Creatinine 0.42 (L) 0.50 - 1.40 mg/dL    Calcium 7.8 (L) 8.5 - 10.5 mg/dL    AST(SGOT) 41 12 - 45 U/L    ALT(SGPT) 18 2 - 50 U/L    Alkaline Phosphatase 115 (H) 30 - 99 U/L    Total Bilirubin 0.6 0.1 - 1.5 mg/dL    Albumin 2.7 (L) 3.2 - 4.9 g/dL    Total Protein 5.4 (L) 6.0 - 8.2 g/dL    Globulin 2.7 1.9 - 3.5 g/dL    A-G Ratio 1.0 g/dL   Lipid Profile (Lipid Panel) Fasting    Collection Time: 10/20/21  3:16 AM   Result Value Ref Range    Cholesterol,Tot 65 (L) 100 - 199 mg/dL    Triglycerides 94 0 - 149 mg/dL    HDL 19 (A) >=40 mg/dL    LDL 27 <100 mg/dL   MAGNESIUM    Collection Time: 10/20/21  3:16 AM   Result Value Ref Range    Magnesium 1.8 1.5 - 2.5 mg/dL   LDH    Collection Time: 10/20/21  3:16 AM   Result Value Ref Range    LDH Total 347 (H) 107 - 266 U/L   URIC ACID    Collection Time: 10/20/21  3:16 AM   Result Value Ref Range    Uric Acid 4.1 2.5 - 8.3 mg/dL   ESTIMATED GFR    Collection Time: 10/20/21  3:16 AM   Result Value Ref Range    GFR If African American >60 >60 mL/min/1.73 m 2    GFR If Non African American >60 >60 mL/min/1.73 m 2   CBC WITH DIFFERENTIAL    Collection Time: 10/20/21  3:17 AM   Result Value Ref Range    WBC 43.5 (HH) 4.8 - 10.8 K/uL    RBC 2.09 (L) 4.70 - 6.10 M/uL    Hemoglobin 7.3 (L) 14.0 - 18.0 g/dL    Hematocrit 22.5 (L) 42.0 - 52.0 %    .7 (H) 81.4 - 97.8 fL    MCH 34.9 (H) 27.0 - 33.0 pg     MCHC 32.4 (L) 33.7 - 35.3 g/dL    .0 (H) 35.9 - 50.0 fL    Platelet Count 50 (L) 164 - 446 K/uL    Neutrophils-Polys 6.80 (L) 44.00 - 72.00 %    Lymphocytes 17.90 (L) 22.00 - 41.00 %    Monocytes 1.70 0.00 - 13.40 %    Eosinophils 0.00 0.00 - 6.90 %    Basophils 0.00 0.00 - 1.80 %    Nucleated RBC 9.30 /100 WBC    Neutrophils (Absolute) 2.96 1.82 - 7.42 K/uL    Lymphs (Absolute) 7.79 (H) 1.00 - 4.80 K/uL    Monos (Absolute) 0.74 0.00 - 0.85 K/uL    Eos (Absolute) 0.00 0.00 - 0.51 K/uL    Baso (Absolute) 0.00 0.00 - 0.12 K/uL    NRBC (Absolute) 4.03 K/uL    Anisocytosis 2+ (A)     Macrocytosis 2+ (A)     Microcytosis 1+    HEPATITIS PANEL ACUTE(4 COMPONENTS)    Collection Time: 10/20/21  3:17 AM   Result Value Ref Range    Hepatitis B Surface Antigen Non-Reactive Non-Reactive    Hepatitis B Cors Ab,IgM Non-Reactive Non-Reactive    Hepatitis A Virus Ab, IgM Non-Reactive Non-Reactive    Hepatitis C Antibody Non-Reactive Non-Reactive   HIV AG/AB COMBO ASSAY SCREENING    Collection Time: 10/20/21  3:17 AM   Result Value Ref Range    HIV Ag/Ab Combo Assay Non-Reactive Non Reactive   DIFFERENTIAL MANUAL    Collection Time: 10/20/21  3:17 AM   Result Value Ref Range    Myelocytes 0.90 %    Blasts 72.70 %    Manual Diff Status PERFORMED    PERIPHERAL SMEAR REVIEW    Collection Time: 10/20/21  3:17 AM   Result Value Ref Range    Peripheral Smear Review see below    PLATELET ESTIMATE    Collection Time: 10/20/21  3:17 AM   Result Value Ref Range    Plt Estimation Decreased    MORPHOLOGY    Collection Time: 10/20/21  3:17 AM   Result Value Ref Range    RBC Morphology Present        Diagnostic imaging:   No orders to display         Assessment and Recommendations: Patient is a 76 year old male with a PMH of COVID infection (7/2021), OA w/ b/l knee replacement, A-fib s/p cardioversion and no anticoagulation, splenectomy and gout who presented to the hospital after he was found to have notably elevated WBC. Patient was  found to have a white count of 44.9 admission along with hemoglobin level of 7.4 and platelet level of 77.  This is all new from 3/2021.     Plan  -Repeat CBC showing leukocytosis inline with first CBC, B12 elevated, folate normal, and fibrinogen wnl.  -Bone marrow biopsy results pending from today (they know it will take a few days).   -Consider starting Hydrea depending on trend of blood counts.  -Continue IV fluid resuscitation.  -Continue allopurinol  -Transfer to oncology floor when possible in anticipation of possible treatment.  -Continue to monitor patient's for possible transfusion needs (hemoglobin <7 and platelets <10k or bleeding, all products should be cmv neg, irradiated).        High complexity     Sultan GAYATHRI Ku M.D., The patient was seen and plan discussed with my attending,     Smith Anguiano MD  [facility]

## 2021-10-20 NOTE — PROGRESS NOTES
[facility] Medicine Daily Progress Note    Date of Service  10/20/2021    Chief Complaint  Alfonso Gaspar is a 76 y.o. male admitted 10/18/2021 with leukocytosis.     Hospital Course  This is a 76 year old male with PMHx of atrial fibrillation (s/p cardioversion, off AC, Dr. Low), hypertension, hyperlipidemia, and gout who was admitted on 10/18/2021 due to abnormal labs. Patient was seen by cardiology, Dr. Low, patient had a cardioversion in September 2021, patient returned to [facility], patient taken off of Eliquis due to recurrent nosebleeds.  Patient is currently on flecainide.  Patient had 4-5 ER visits due to this recurrent epistaxis, requiring packing, cauterization, and surgical procedure performed by ENT.  Patient is currently off of any anticoagulation.  Patient was sent in by PCP due to findings of leukocytosis, anemia, and thrombocytopenia on repeat labs. Labs on admission noted hemoglobin of 7.4, patient was transfused 1 unit, hemoglobin remained at 7.3.  We will continue with serial hemoglobins and transfuse if less than 7. Oncology was consulted, they do recommend inpatient bone marrow biopsy, this was performed 10/19, by my colleague, Dr. Arthur Caruso.  Oncology following.     Continue with serial hemoglobins, transfuse if hemoglobin less than 7.  Pending further oncology recommendations.    Interval Problem Update  Patient was seen and examined at bedside.  No acute events overnight. Patient is resting comfortably in bed and in no acute distress.     Transfer to oncology  Bone marrow biopsy completed 10/19  Await bone marrow results  WBC 28.1 --> 43.5  Oncology recommendations appreciated    Consultants/Specialty  oncology    Code Status  Full Code    Disposition  Patient is not medically cleared.   Anticipate discharge to to home with close outpatient follow-up.  I have placed the appropriate orders for post-discharge needs.    Review of Systems  Review of Systems   Constitutional: Positive for  malaise/fatigue. Negative for chills, fever and weight loss.   HENT: Positive for congestion. Negative for sore throat.    Eyes: Negative for pain and redness.   Respiratory: Negative for cough and shortness of breath.    Cardiovascular: Negative for chest pain and palpitations.   Gastrointestinal: Negative for abdominal pain, nausea and vomiting.   Genitourinary: Negative for dysuria and frequency.   Musculoskeletal: Negative for falls.   Neurological: Negative for seizures and loss of consciousness.        Physical Exam  Temp:  [36.2 °C (97.2 °F)-37.2 °C (98.9 °F)] 36.8 °C (98.2 °F)  Pulse:  [] 94  Resp:  [16-22] 18  BP: (106-140)/(46-77) 106/52  SpO2:  [90 %-100 %] 93 %    Physical Exam  Vitals and nursing note reviewed.   Constitutional:       General: He is not in acute distress.     Appearance: He is not toxic-appearing.      Comments: Pleasant, conversational   HENT:      Head: Normocephalic.      Right Ear: External ear normal.      Left Ear: External ear normal.      Nose: No congestion.      Mouth/Throat:      Mouth: Mucous membranes are moist.      Pharynx: No oropharyngeal exudate.   Eyes:      General: No scleral icterus.     Pupils: Pupils are equal, round, and reactive to light.   Cardiovascular:      Rate and Rhythm: Normal rate and regular rhythm.      Heart sounds: No murmur heard.     Pulmonary:      Breath sounds: No wheezing.   Abdominal:      Palpations: Abdomen is soft.      Tenderness: There is no abdominal tenderness. There is no guarding or rebound.   Musculoskeletal:         General: No swelling or deformity.   Lymphadenopathy:      Cervical: No cervical adenopathy.   Skin:     Coloration: Skin is not jaundiced.      Findings: No bruising.   Neurological:      General: No focal deficit present.      Mental Status: He is alert and oriented to person, place, and time.      Motor: No weakness.   Psychiatric:         Mood and Affect: Mood normal.         Behavior: Behavior normal.          Thought Content: Thought content normal.         Judgment: Judgment normal.         Fluids    Intake/Output Summary (Last 24 hours) at 10/20/2021 1255  Last data filed at 10/20/2021 0409  Gross per 24 hour   Intake 1040 ml   Output 700 ml   Net 340 ml       Laboratory  Recent Labs     10/18/21  2203 10/18/21  2203 10/19/21  1140 10/19/21  1958 10/20/21  0317   WBC 44.9*  --   --  28.1* 43.5*   RBC 2.07*  --   --  2.22* 2.09*   HEMOGLOBIN 7.4*   < > 7.3* 7.8* 7.3*   HEMATOCRIT 23.1*   < > 23.3* 24.0* 22.5*   .6*  --   --  108.1* 107.7*   MCH 35.7*  --   --  35.1* 34.9*   MCHC 32.0*  --   --  32.5* 32.4*   .0*  --   --  109.1* 106.0*   PLATELETCT 77*  --   --  68* 50*    < > = values in this interval not displayed.     Recent Labs     10/18/21  2203 10/20/21  0316   SODIUM 131* 133*   POTASSIUM 4.9 4.2   CHLORIDE 100 104   CO2 21 22   GLUCOSE 105* 82   BUN 24* 18   CREATININE 0.58 0.42*   CALCIUM 8.1* 7.8*     Recent Labs     10/18/21  2203   APTT 37.4*   INR 1.29*         Recent Labs     10/20/21  0316   TRIGLYCERIDE 94   HDL 19*   LDL 27       Imaging  No orders to display        Assessment/Plan  * Leukocytosis- (present on admission)  Assessment & Plan  Likely secondary to Leukemia  S/p bone marrow biopsy 10/19/2021 by Dr. Arthur Caruso  Oncology consulted, Dr. ALEX Anguiano     Thrombocytopenia (HCC)  Assessment & Plan  Not currently bleeding, no epistaxis  Tranfuse platelets prn  Likely secondary to marrow dysfunction    Atrial fibrillation (HCC)- (present on admission)  Assessment & Plan  Patient follows up with Dr. Low  Currently had a cardioversion in September 2021  As per cardiology no longer on anticoagulation  Patient to continue with flecainide    Anemia- (present on admission)  Assessment & Plan  Suspected marrow process such as Leukemia  S/p bone marrow biopsy 10/19/2021 by Dr. Arthur Caruso  Oncology consulted, Dr. ALEX Anguiano     Required 1 unit PRBC on 10/18/2021  Monitor with serial hemoglobins,  transfuse if hemoglobin is less than 7    Hyponatremia  Assessment & Plan  Na 133  Monitor    Elevated LDH  Assessment & Plan    Monitor in setting of possible leukemia    Essential hypertension, benign- (present on admission)  Assessment & Plan  Continue Home Medications  Labetalol ivp prn with parameters       VTE prophylaxis: SCDs/TEDs    I have performed a physical exam and reviewed and updated ROS and Plan today (10/20/2021). In review of yesterday's note (10/19/2021), there are no changes except as documented above.

## 2021-10-21 PROBLEM — C92.00 AML (ACUTE MYELOBLASTIC LEUKEMIA) (HCC): Status: ACTIVE | Noted: 2021-10-21

## 2021-10-21 LAB
ALBUMIN SERPL BCP-MCNC: 2.4 G/DL (ref 3.2–4.9)
ALBUMIN/GLOB SERPL: 0.8 G/DL
ALP SERPL-CCNC: 112 U/L (ref 30–99)
ALT SERPL-CCNC: 17 U/L (ref 2–50)
ANION GAP SERPL CALC-SCNC: 6 MMOL/L (ref 7–16)
ANISOCYTOSIS BLD QL SMEAR: ABNORMAL
ANISOCYTOSIS BLD QL SMEAR: ABNORMAL
AST SERPL-CCNC: 39 U/L (ref 12–45)
BASOPHILS # BLD AUTO: 0 % (ref 0–1.8)
BASOPHILS # BLD AUTO: 0 % (ref 0–1.8)
BASOPHILS # BLD: 0 K/UL (ref 0–0.12)
BASOPHILS # BLD: 0 K/UL (ref 0–0.12)
BILIRUB SERPL-MCNC: 0.6 MG/DL (ref 0.1–1.5)
BLASTS NFR BLD MANUAL: 44.1 %
BLASTS NFR BLD MANUAL: 47.9 %
BUN SERPL-MCNC: 21 MG/DL (ref 8–22)
CALCIUM SERPL-MCNC: 7.8 MG/DL (ref 8.5–10.5)
CHLORIDE SERPL-SCNC: 102 MMOL/L (ref 96–112)
CMV IGG SERPL IA-ACNC: >10 U/ML
CMV IGM SERPL IA-ACNC: 26.1 AU/ML
CO2 SERPL-SCNC: 24 MMOL/L (ref 20–33)
CREAT SERPL-MCNC: 0.42 MG/DL (ref 0.5–1.4)
EKG IMPRESSION: NORMAL
EOSINOPHIL # BLD AUTO: 0 K/UL (ref 0–0.51)
EOSINOPHIL # BLD AUTO: 0 K/UL (ref 0–0.51)
EOSINOPHIL NFR BLD: 0 % (ref 0–6.9)
EOSINOPHIL NFR BLD: 0 % (ref 0–6.9)
ERYTHROCYTE [DISTWIDTH] IN BLOOD BY AUTOMATED COUNT: 108.5 FL (ref 35.9–50)
ERYTHROCYTE [DISTWIDTH] IN BLOOD BY AUTOMATED COUNT: 93.5 FL (ref 35.9–50)
GLOBULIN SER CALC-MCNC: 2.9 G/DL (ref 1.9–3.5)
GLUCOSE SERPL-MCNC: 101 MG/DL (ref 65–99)
HCT VFR BLD AUTO: 21.5 % (ref 42–52)
HCT VFR BLD AUTO: 23.5 % (ref 42–52)
HGB BLD-MCNC: 6.6 G/DL (ref 14–18)
HGB BLD-MCNC: 7.7 G/DL (ref 14–18)
INR PPP: 1.3 (ref 0.87–1.13)
LYMPHOCYTES # BLD AUTO: 18.67 K/UL (ref 1–4.8)
LYMPHOCYTES # BLD AUTO: 23.33 K/UL (ref 1–4.8)
LYMPHOCYTES NFR BLD: 38.5 % (ref 22–41)
LYMPHOCYTES NFR BLD: 46.1 % (ref 22–41)
MACROCYTES BLD QL SMEAR: ABNORMAL
MACROCYTES BLD QL SMEAR: ABNORMAL
MAGNESIUM SERPL-MCNC: 1.8 MG/DL (ref 1.5–2.5)
MANUAL DIFF BLD: NORMAL
MANUAL DIFF BLD: NORMAL
MCH RBC QN AUTO: 34 PG (ref 27–33)
MCH RBC QN AUTO: 34.7 PG (ref 27–33)
MCHC RBC AUTO-ENTMCNC: 30.7 G/DL (ref 33.7–35.3)
MCHC RBC AUTO-ENTMCNC: 32.8 G/DL (ref 33.7–35.3)
MCV RBC AUTO: 105.9 FL (ref 81.4–97.8)
MCV RBC AUTO: 110.8 FL (ref 81.4–97.8)
MICROCYTES BLD QL SMEAR: ABNORMAL
MICROCYTES BLD QL SMEAR: ABNORMAL
MONOCYTES # BLD AUTO: 0.44 K/UL (ref 0–0.85)
MONOCYTES # BLD AUTO: 0.46 K/UL (ref 0–0.85)
MONOCYTES NFR BLD AUTO: 0.9 % (ref 0–13.4)
MONOCYTES NFR BLD AUTO: 0.9 % (ref 0–13.4)
MORPHOLOGY BLD-IMP: NORMAL
MORPHOLOGY BLD-IMP: NORMAL
NEUTROPHILS # BLD AUTO: 2.58 K/UL (ref 1.82–7.42)
NEUTROPHILS # BLD AUTO: 8 K/UL (ref 1.82–7.42)
NEUTROPHILS NFR BLD: 15.6 % (ref 44–72)
NEUTROPHILS NFR BLD: 5.1 % (ref 44–72)
NEUTS BAND NFR BLD MANUAL: 0.9 % (ref 0–10)
NRBC # BLD AUTO: 3.54 K/UL
NRBC # BLD AUTO: 3.79 K/UL
NRBC BLD-RTO: 7 /100 WBC
NRBC BLD-RTO: 7.8 /100 WBC
PHOSPHATE SERPL-MCNC: 3.2 MG/DL (ref 2.5–4.5)
PLATELET # BLD AUTO: 58 K/UL (ref 164–446)
PLATELET # BLD AUTO: 63 K/UL (ref 164–446)
PLATELET BLD QL SMEAR: NORMAL
PLATELET BLD QL SMEAR: NORMAL
PLATELETS.RETICULATED NFR BLD AUTO: 28.6 K/UL (ref 0.6–13.1)
POLYCHROMASIA BLD QL SMEAR: NORMAL
POTASSIUM SERPL-SCNC: 4.6 MMOL/L (ref 3.6–5.5)
PROT SERPL-MCNC: 5.3 G/DL (ref 6–8.2)
PROTHROMBIN TIME: 15.8 SEC (ref 12–14.6)
RBC # BLD AUTO: 1.94 M/UL (ref 4.7–6.1)
RBC # BLD AUTO: 2.22 M/UL (ref 4.7–6.1)
RBC BLD AUTO: PRESENT
RBC BLD AUTO: PRESENT
SMUDGE CELLS BLD QL SMEAR: NORMAL
SMUDGE CELLS BLD QL SMEAR: NORMAL
SODIUM SERPL-SCNC: 132 MMOL/L (ref 135–145)
WBC # BLD AUTO: 48.5 K/UL (ref 4.8–10.8)
WBC # BLD AUTO: 50.6 K/UL (ref 4.8–10.8)

## 2021-10-21 PROCEDURE — 36430 TRANSFUSION BLD/BLD COMPNT: CPT

## 2021-10-21 PROCEDURE — 84100 ASSAY OF PHOSPHORUS: CPT

## 2021-10-21 PROCEDURE — 700111 HCHG RX REV CODE 636 W/ 250 OVERRIDE (IP): Performed by: NURSE PRACTITIONER

## 2021-10-21 PROCEDURE — 85610 PROTHROMBIN TIME: CPT

## 2021-10-21 PROCEDURE — 96365 THER/PROPH/DIAG IV INF INIT: CPT

## 2021-10-21 PROCEDURE — P9016 RBC LEUKOCYTES REDUCED: HCPCS

## 2021-10-21 PROCEDURE — 80053 COMPREHEN METABOLIC PANEL: CPT

## 2021-10-21 PROCEDURE — 85027 COMPLETE CBC AUTOMATED: CPT | Mod: 91

## 2021-10-21 PROCEDURE — 770004 HCHG ROOM/CARE - ONCOLOGY PRIVATE *

## 2021-10-21 PROCEDURE — 85055 RETICULATED PLATELET ASSAY: CPT

## 2021-10-21 PROCEDURE — A9270 NON-COVERED ITEM OR SERVICE: HCPCS | Performed by: INTERNAL MEDICINE

## 2021-10-21 PROCEDURE — 700102 HCHG RX REV CODE 250 W/ 637 OVERRIDE(OP): Performed by: GENERAL PRACTICE

## 2021-10-21 PROCEDURE — 36415 COLL VENOUS BLD VENIPUNCTURE: CPT

## 2021-10-21 PROCEDURE — 99233 SBSQ HOSP IP/OBS HIGH 50: CPT | Performed by: NURSE PRACTITIONER

## 2021-10-21 PROCEDURE — 93005 ELECTROCARDIOGRAM TRACING: CPT | Performed by: NURSE PRACTITIONER

## 2021-10-21 PROCEDURE — 93010 ELECTROCARDIOGRAM REPORT: CPT | Performed by: INTERNAL MEDICINE

## 2021-10-21 PROCEDURE — 700102 HCHG RX REV CODE 250 W/ 637 OVERRIDE(OP): Performed by: INTERNAL MEDICINE

## 2021-10-21 PROCEDURE — A9270 NON-COVERED ITEM OR SERVICE: HCPCS | Performed by: GENERAL PRACTICE

## 2021-10-21 PROCEDURE — 83735 ASSAY OF MAGNESIUM: CPT

## 2021-10-21 PROCEDURE — 85007 BL SMEAR W/DIFF WBC COUNT: CPT | Mod: 91

## 2021-10-21 RX ORDER — MAGNESIUM SULFATE 1 G/100ML
1 INJECTION INTRAVENOUS ONCE
Status: COMPLETED | OUTPATIENT
Start: 2021-10-21 | End: 2021-10-21

## 2021-10-21 RX ADMIN — FLECAINIDE ACETATE 50 MG: 50 TABLET ORAL at 05:38

## 2021-10-21 RX ADMIN — ALLOPURINOL 300 MG: 300 TABLET ORAL at 05:36

## 2021-10-21 RX ADMIN — TRAZODONE HYDROCHLORIDE 50 MG: 100 TABLET ORAL at 23:44

## 2021-10-21 RX ADMIN — FLECAINIDE ACETATE 50 MG: 50 TABLET ORAL at 16:33

## 2021-10-21 RX ADMIN — MAGNESIUM SULFATE HEPTAHYDRATE 1 G: 1 INJECTION, SOLUTION INTRAVENOUS at 09:17

## 2021-10-21 ASSESSMENT — ENCOUNTER SYMPTOMS
HEMOPTYSIS: 0
WEIGHT LOSS: 0
DIARRHEA: 0
BLOOD IN STOOL: 0
FOCAL WEAKNESS: 0
MYALGIAS: 0
FALLS: 0
LOSS OF CONSCIOUSNESS: 0
WHEEZING: 0
SORE THROAT: 0
EYE PAIN: 0
PALPITATIONS: 0
NAUSEA: 0
HEADACHES: 0
ABDOMINAL PAIN: 0
VOMITING: 0
DEPRESSION: 1
SHORTNESS OF BREATH: 0
DIAPHORESIS: 0
FEVER: 0
EYE REDNESS: 0
POLYDIPSIA: 0
BLURRED VISION: 0
COUGH: 0
PND: 0
BRUISES/BLEEDS EASILY: 0
CHILLS: 0
DIZZINESS: 0
SEIZURES: 0

## 2021-10-21 ASSESSMENT — PAIN DESCRIPTION - PAIN TYPE: TYPE: ACUTE PAIN

## 2021-10-21 NOTE — PROGRESS NOTES
HEMATOLOGY-ONCOLOGY PROGRESS NOTE    Date of Service: 10/21/2021     Reason for Consult: Notable leukocytosis     HPI: Patient is a 76 year old male with a PMH of COVID infection (7/2021), OA w/ b/l knee replacement, A-fib s/p cardioversion and no anticoagulation, splenectomy and gout who presented to the hospital after he was found to have notably elevated WBC.     Reports that he had a Covid infection back in July of this year and ever since then has been experiencing more fatigue. Patient reports that over the past 2 months he is started experiencing night sweats where he woke up drenched in sweat along with recurrent episodes of epistaxis requiring blood transfusions and cauterization. Patient also reports having bleeding from his gums during this time. Patient is unsure of weight loss but he thinks he might have had some weight loss due to poor appetite recently.     Patient denies any notable family history of cancer. Reports that his sister had lung cancer but was a smoker. Patient reports that he smoked until age 34 for combined pack-year history of 7.5 years. Denies any history of heavy alcohol use or drug use. He says that he used [place] roughly once per year on his lawn, otherwise cannot think of any other exposure to toxic chemicals. 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     Patient was found to have a white count of 44.9 admission along with hemoglobin level of 7.4 and platelet level of 77. He underwent a bone marrow biopsy after admission.    Events: Leukocytosis stable with platelets slightly down trending. Will work on PICC line placement.    Subjective: Patient denies any acute complaints overnight. Patient was put on supplemental oxygen overnight, however he was later taken off.  Objective:  Medications reviewed and notable for:  Current Facility-Administered Medications   Medication Dose   • ipratropium-albuterol (DUONEB) nebulizer solution  3 mL   • enalapril (VASOTEC) tablet 10  "mg  10 mg   • senna-docusate (PERICOLACE or SENOKOT S) 8.6-50 MG per tablet 2 Tablet  2 Tablet    And   • polyethylene glycol/lytes (MIRALAX) PACKET 1 Packet  1 Packet    And   • magnesium hydroxide (MILK OF MAGNESIA) suspension 30 mL  30 mL    And   • bisacodyl (DULCOLAX) suppository 10 mg  10 mg   • acetaminophen (Tylenol) tablet 650 mg  650 mg   • enalaprilat (Vasotec) injection 1.25 mg 1 mL  1.25 mg   • labetalol (NORMODYNE/TRANDATE) injection 10 mg  10 mg   • ondansetron (ZOFRAN) syringe/vial injection 4 mg  4 mg   • ondansetron (ZOFRAN ODT) dispertab 4 mg  4 mg   • glucose 4 g chewable tablet 16 g  16 g    And   • dextrose 50% (D50W) injection 50 mL  50 mL   • flecainide (TAMBOCOR) tablet 50 mg  50 mg   • influenza Vac High-Dose Quad (Fluzone) injection 0.7 mL  0.7 mL   • traZODone (DESYREL) tablet 50 mg  50 mg   • allopurinol (ZYLOPRIM) tablet 300 mg  300 mg     Review of Systems   Constitutional: Negative for chills and fever.   HENT: Positive for hearing loss. Negative for ear pain and tinnitus.    Eyes: Negative for blurred vision.   Respiratory: Negative for cough, hemoptysis and shortness of breath.    Cardiovascular: Negative for chest pain and palpitations.   Skin: Negative for itching and rash.   Neurological: Negative for dizziness and headaches.   Endo/Heme/Allergies: Negative for polydipsia. Does not bruise/bleed easily.       /55   Pulse 85   Temp 36.4 °C (97.6 °F) (Temporal)   Resp 18   Ht 1.778 m (5' 10\")   Wt 77.9 kg (171 lb 11.8 oz)   SpO2 95%     Physical Exam  Constitutional:       General: He is not in acute distress.     Appearance: Normal appearance. He is not ill-appearing, toxic-appearing or diaphoretic.   HENT:      Head: Normocephalic and atraumatic.      Mouth/Throat:      Mouth: Mucous membranes are moist.      Pharynx: Oropharynx is clear. No oropharyngeal exudate or posterior oropharyngeal erythema.   Eyes:      General: No scleral icterus.        Right eye: No " discharge.         Left eye: No discharge.      Conjunctiva/sclera: Conjunctivae normal.   Cardiovascular:      Rate and Rhythm: Normal rate and regular rhythm.      Pulses: Normal pulses.      Heart sounds: Normal heart sounds. No murmur heard.   No gallop.    Pulmonary:      Effort: Pulmonary effort is normal. No respiratory distress.      Breath sounds: Normal breath sounds.   Chest:      Chest wall: No tenderness.   Abdominal:      General: Abdomen is flat. Bowel sounds are normal. There is no distension.      Palpations: Abdomen is soft. There is no mass.   Skin:     Coloration: Skin is not jaundiced or pale.   Neurological:      General: No focal deficit present.      Mental Status: He is alert and oriented to person, place, and time.   Psychiatric:         Mood and Affect: Mood normal.         Behavior: Behavior normal.         Labs reviewed and notable for:  Recent Labs     10/19/21  1958 10/20/21  0317 10/21/21  0037   WBC 28.1* 43.5* 48.5*   RBC 2.22* 2.09* 1.94*   HEMOGLOBIN 7.8* 7.3* 6.6*   HEMATOCRIT 24.0* 22.5* 21.5*   .1* 107.7* 110.8*   MCH 35.1* 34.9* 34.0*   MCHC 32.5* 32.4* 30.7*   .1* 106.0* 108.5*   PLATELETCT 68* 50* 63*     Recent Labs     10/18/21  2203   APTT 37.4*   INR 1.29*     .@CMP  Recent Results (from the past 24 hour(s))   Comp Metabolic Panel    Collection Time: 10/21/21 12:37 AM   Result Value Ref Range    Sodium 132 (L) 135 - 145 mmol/L    Potassium 4.6 3.6 - 5.5 mmol/L    Chloride 102 96 - 112 mmol/L    Co2 24 20 - 33 mmol/L    Anion Gap 6.0 (L) 7.0 - 16.0    Glucose 101 (H) 65 - 99 mg/dL    Bun 21 8 - 22 mg/dL    Creatinine 0.42 (L) 0.50 - 1.40 mg/dL    Calcium 7.8 (L) 8.5 - 10.5 mg/dL    AST(SGOT) 39 12 - 45 U/L    ALT(SGPT) 17 2 - 50 U/L    Alkaline Phosphatase 112 (H) 30 - 99 U/L    Total Bilirubin 0.6 0.1 - 1.5 mg/dL    Albumin 2.4 (L) 3.2 - 4.9 g/dL    Total Protein 5.3 (L) 6.0 - 8.2 g/dL    Globulin 2.9 1.9 - 3.5 g/dL    A-G Ratio 0.8 g/dL   MAGNESIUM     Collection Time: 10/21/21 12:37 AM   Result Value Ref Range    Magnesium 1.8 1.5 - 2.5 mg/dL   PHOSPHORUS    Collection Time: 10/21/21 12:37 AM   Result Value Ref Range    Phosphorus 3.2 2.5 - 4.5 mg/dL   CBC WITH DIFFERENTIAL    Collection Time: 10/21/21 12:37 AM   Result Value Ref Range    WBC 48.5 (HH) 4.8 - 10.8 K/uL    RBC 1.94 (L) 4.70 - 6.10 M/uL    Hemoglobin 6.6 (L) 14.0 - 18.0 g/dL    Hematocrit 21.5 (L) 42.0 - 52.0 %    .8 (H) 81.4 - 97.8 fL    MCH 34.0 (H) 27.0 - 33.0 pg    MCHC 30.7 (L) 33.7 - 35.3 g/dL    .5 (H) 35.9 - 50.0 fL    Platelet Count 63 (L) 164 - 446 K/uL    Neutrophils-Polys 15.60 (L) 44.00 - 72.00 %    Lymphocytes 38.50 22.00 - 41.00 %    Monocytes 0.90 0.00 - 13.40 %    Eosinophils 0.00 0.00 - 6.90 %    Basophils 0.00 0.00 - 1.80 %    Nucleated RBC 7.80 /100 WBC    Neutrophils (Absolute) 8.00 (H) 1.82 - 7.42 K/uL    Lymphs (Absolute) 18.67 (H) 1.00 - 4.80 K/uL    Monos (Absolute) 0.44 0.00 - 0.85 K/uL    Eos (Absolute) 0.00 0.00 - 0.51 K/uL    Baso (Absolute) 0.00 0.00 - 0.12 K/uL    NRBC (Absolute) 3.79 K/uL    Anisocytosis 2+ (A)     Macrocytosis 2+ (A)     Microcytosis 2+ (A)    ESTIMATED GFR    Collection Time: 10/21/21 12:37 AM   Result Value Ref Range    GFR If African American >60 >60 mL/min/1.73 m 2    GFR If Non African American >60 >60 mL/min/1.73 m 2   DIFFERENTIAL MANUAL    Collection Time: 10/21/21 12:37 AM   Result Value Ref Range    Bands-Stabs 0.90 0.00 - 10.00 %    Blasts 44.10 %    Manual Diff Status PERFORMED    PERIPHERAL SMEAR REVIEW    Collection Time: 10/21/21 12:37 AM   Result Value Ref Range    Peripheral Smear Review see below    PLATELET ESTIMATE    Collection Time: 10/21/21 12:37 AM   Result Value Ref Range    Plt Estimation Decreased    MORPHOLOGY    Collection Time: 10/21/21 12:37 AM   Result Value Ref Range    RBC Morphology Present     Smudge Cells Moderate    IMMATURE PLT FRACTION    Collection Time: 10/21/21 12:37 AM   Result Value Ref Range     Imm. Plt Fraction 28.6 (H) 0.6 - 13.1 K/uL   EKG    Collection Time: 10/21/21  8:40 AM   Result Value Ref Range    Report       [facility]    Test Date:  2021-10-21  Pt Name:    TUCKER REYNOLDS                   Department: [facility]  MRN:        [id number]                      Room:       20  Gender:     Male                         Technician: JULIO  :        1945                   Requested By:LEDA PEOPLES  Order #:    [id number]                    Reading MD: Darlene Escobar MD    Measurements  Intervals                                Axis  Rate:       84                           P:          37  AK:         228                          QRS:        50  QRSD:       72                           T:          45  QT:         352  QTc:        417    Interpretive Statements  SINUS RHYTHM  FIRST DEGREE AV BLOCK  LOW VOLTAGE IN FRONTAL LEADS  CONSIDER ANTEROSEPTAL INFARCT  No previous ECG available for comparison  Electronically Signed On 10- 10:15:24 PDT by Darlene Escobar MD         Diagnostic imaging:   No orders to display         Assessment and Recommendations: Patient is a 76 year old male with a PMH of COVID infection (2021), OA w/ b/l knee replacement, A-fib s/p cardioversion and no anticoagulation, splenectomy and gout who presented to the hospital after he was found to have notably elevated WBC. Patient was found to have a white count of 44.9 admission along with hemoglobin level of 7.4 and platelet level of 77.  This is all new from 3/2021.     Plan  -Repeat CBC showing leukocytosis inline with first CBC, B12 elevated, folate normal, and fibrinogen wnl.  -Bone marrow biopsy results pending from today (they know it will take a few days).   -Consider starting Hydrea depending on trend of blood counts.  -Continue IV fluid resuscitation.  -Continue allopurinol.  -CMV ab neg.  -Will work on placing PICC line for patient.  -Continue to monitor patient's for possible transfusion needs (hemoglobin  <7 and platelets <10k or bleeding, all products should be cmv neg, irradiated).        High complexity     Sultan GAYATHRI Ku M.D., The patient was seen and plan discussed with my attending,     Smith Anguiano MD  [facility]

## 2021-10-21 NOTE — PROGRESS NOTES
[facility] Medicine Daily Progress Note    Date of Service  10/21/2021    Chief Complaint  Alfonso Gaspar is a 76 y.o. male admitted 10/18/2021 with leukocytosis.     Hospital Course  This is a 76 year old male with PMHx of atrial fibrillation (s/p cardioversion, off AC, Dr. Low), hypertension, hyperlipidemia, and gout who was admitted on 10/18/2021 due to abnormal labs. Patient was seen by cardiology, Dr. Low, patient had a cardioversion in September 2021, patient returned to [facility], patient taken off of Eliquis due to recurrent nosebleeds.  Patient is currently on flecainide.  Patient had 4-5 ER visits due to this recurrent epistaxis, requiring packing, cauterization, and surgical procedure performed by ENT.  Patient is currently off of any anticoagulation.  Patient was sent in by PCP due to findings of leukocytosis, anemia, and thrombocytopenia on repeat labs. Labs on admission noted hemoglobin of 7.4, patient was transfused 1 unit, hemoglobin remained at 7.3.  We will continue with serial hemoglobins and transfuse if less than 7. Oncology was consulted, they do recommend inpatient bone marrow biopsy, this was performed 10/19, by my colleague, Dr. Arthur Caruso.  Oncology following.    Interval Problem Update  10/21: Hg 6.6 Transfused overnight. WBC 48.5. Plt 63. Na 132. Fibrinogen OK. Hep panel benign. HIV NR. Discussed w Onc. Initiate/monitor induction when cytology, FISH, etc.,finalized. No complaints beyond situational depression. Declined SI. Declined speaking w psych or clergy. Has supportive family. EKG interpreted by me w SR 1st deg block.     10/20: Patient was seen and examined at bedside.  No acute events overnight. Patient is resting comfortably in bed and in no acute distress.     Transfer to oncology  Bone marrow biopsy completed 10/19  Await bone marrow results  WBC 28.1 --> 43.5  Oncology recommendations appreciated    Consultants/Specialty  oncology    Code Status  Full Code    Disposition  Patient  is not medically cleared.   Anticipate discharge to to home with close outpatient follow-up.  I have placed the appropriate orders for post-discharge needs.    Review of Systems  Review of Systems   Constitutional: Negative for chills, fever, malaise/fatigue and weight loss.   HENT: Negative for sore throat.    Eyes: Negative for pain and redness.   Respiratory: Negative for cough and shortness of breath.    Cardiovascular: Negative for chest pain and palpitations.   Gastrointestinal: Negative for abdominal pain, nausea and vomiting.   Genitourinary: Negative for dysuria and frequency.   Musculoskeletal: Negative for falls.   Neurological: Negative for seizures and loss of consciousness.   Psychiatric/Behavioral: Positive for depression.        Physical Exam  Temp:  [36.3 °C (97.4 °F)-37.1 °C (98.7 °F)] 36.8 °C (98.3 °F)  Pulse:  [] 86  Resp:  [17-20] 18  BP: ()/(40-66) 114/66  SpO2:  [90 %-96 %] 95 %    Physical Exam  Vitals and nursing note reviewed.   Constitutional:       General: He is not in acute distress.     Appearance: He is not toxic-appearing.      Comments: Pleasant, conversational   HENT:      Head: Normocephalic.      Right Ear: External ear normal.      Left Ear: External ear normal.      Nose: No congestion.      Mouth/Throat:      Mouth: Mucous membranes are moist.      Pharynx: No oropharyngeal exudate.   Eyes:      General: No scleral icterus.     Pupils: Pupils are equal, round, and reactive to light.   Cardiovascular:      Rate and Rhythm: Normal rate and regular rhythm.      Heart sounds: No murmur heard.     Pulmonary:      Breath sounds: No wheezing.   Abdominal:      Palpations: Abdomen is soft.      Tenderness: There is no abdominal tenderness. There is no guarding or rebound.   Musculoskeletal:         General: No swelling or deformity.   Lymphadenopathy:      Cervical: No cervical adenopathy.   Skin:     Coloration: Skin is not jaundiced.      Findings: No bruising.    Neurological:      General: No focal deficit present.      Mental Status: He is alert and oriented to person, place, and time.      Motor: No weakness.   Psychiatric:         Mood and Affect: Mood normal.         Behavior: Behavior normal.         Thought Content: Thought content normal.         Judgment: Judgment normal.         Fluids  No intake or output data in the 24 hours ending 10/21/21 1723    Laboratory  Recent Labs     10/19/21  1958 10/20/21  0317 10/21/21  0037   WBC 28.1* 43.5* 48.5*   RBC 2.22* 2.09* 1.94*   HEMOGLOBIN 7.8* 7.3* 6.6*   HEMATOCRIT 24.0* 22.5* 21.5*   .1* 107.7* 110.8*   MCH 35.1* 34.9* 34.0*   MCHC 32.5* 32.4* 30.7*   .1* 106.0* 108.5*   PLATELETCT 68* 50* 63*     Recent Labs     10/18/21  2203 10/20/21  0316 10/21/21  0037   SODIUM 131* 133* 132*   POTASSIUM 4.9 4.2 4.6   CHLORIDE 100 104 102   CO2 21 22 24   GLUCOSE 105* 82 101*   BUN 24* 18 21   CREATININE 0.58 0.42* 0.42*   CALCIUM 8.1* 7.8* 7.8*     Recent Labs     10/18/21  2203   APTT 37.4*   INR 1.29*         Recent Labs     10/20/21  0316   TRIGLYCERIDE 94   HDL 19*   LDL 27       Imaging  No orders to display        Assessment/Plan  * AML (acute myeloblastic leukemia) (HCC)  Assessment & Plan  Based on Onc interpretation of BMB  Pending final pathology with cytogenetics, FISH panel for AML as well as MDS, and other PCR testing.  10/21 PICC    Hyponatremia  Assessment & Plan  Na 133  Monitor    Elevated LDH  Assessment & Plan    Monitor following induction    Thrombocytopenia (HCC)  Assessment & Plan  Not currently bleeding, no epistaxis  Tranfuse platelets prn  Likely secondary to marrow dysfunction  Daily CBC    Leukocytosis- (present on admission)  Assessment & Plan  Likely secondary to Leukemia  S/p bone marrow biopsy 10/19/2021 by Dr. Arthur Caruso  Oncology consulted, Dr. ALEX Anguiano   Daily CBC w diff      Atrial fibrillation (HCC)- (present on admission)  Assessment & Plan  Patient follows up with   Maranda  Currently had a cardioversion in September 2021  As per cardiology no longer on anticoagulation  Patient to continue with flecainide  10/21: EKG - SR    Anemia- (present on admission)  Assessment & Plan  Suspected marrow process such as Leukemia  S/p bone marrow biopsy 10/19/2021 by Dr. Arthur Caruso  Oncology consulted, Dr. ALEX Anguiano     Required 1 unit PRBC on 10/18/2021  Monitor with serial hemoglobins, transfuse if hemoglobin is less than 7  10/21 Transfused; daily CBC w diff    Essential hypertension, benign- (present on admission)  Assessment & Plan  Continue Home Medications  Labetalol ivp prn with parameters       VTE prophylaxis: SCDs/TEDs    I have performed a physical exam and reviewed and updated ROS and Plan today (10/21/2021). In review of yesterday's note (10/20/2021), there are no changes except as documented above.

## 2021-10-21 NOTE — CARE PLAN
The patient is Stable - Low risk of patient condition declining or worsening    Shift Goals  Clinical Goals: safety  Patient Goals: shower, walk    Progress made toward(s) clinical / shift goals:    Problem: Mobility  Goal: Patient's capacity to carry out activities will improve  Outcome: Progressing  Note: Pt ambulated hallways this shift with SBA and FWW.      Problem: Self Care  Goal: Patient will have the ability to perform ADLs independently or with assistance (bathe, groom, dress, toilet and feed)  Outcome: Progressing  Note: Pt showered today.        Patient is not progressing towards the following goals:

## 2021-10-21 NOTE — ASSESSMENT & PLAN NOTE
Per BMBx done 10/18/21  Vidaza and venetoclax initiated 10/26/2021  Continue antibiotic prophylaxis with acyclovir and voriconazole  Follow oncology recommendations   Monitor for tumor lysis   10/21 PICC  11/1: Day 7 of treatment.  Oncology attempting to setup medications as outpatient for ongoing treatment.   11/2: Stable. Oncology working on outpatient Venetoclax and Vidaza for discharge   11/4: Prior authorization has been sent for above medications. Awaiting insurance approval per oncology

## 2021-10-21 NOTE — PROGRESS NOTES
Received report from [facility], assumed care of pt 0720  Pt is A&0x4, up SBA, steady gait. Pt denies pain, denies nausea.  Pt received 1 unit PRBCs this morning.   Right PIV patent with + blood return.   Assessment complete, all needs met at this time.

## 2021-10-22 ENCOUNTER — APPOINTMENT (OUTPATIENT)
Dept: RADIOLOGY | Facility: MEDICAL CENTER | Age: 76
DRG: 835 | End: 2021-10-22
Attending: NURSE PRACTITIONER
Payer: COMMERCIAL

## 2021-10-22 PROBLEM — R09.82 POST-NASAL DRIP: Status: ACTIVE | Noted: 2021-10-22

## 2021-10-22 LAB
ANION GAP SERPL CALC-SCNC: 8 MMOL/L (ref 7–16)
ANISOCYTOSIS BLD QL SMEAR: ABNORMAL
BASOPHILS # BLD AUTO: 0 % (ref 0–1.8)
BASOPHILS # BLD: 0 K/UL (ref 0–0.12)
BLASTS NFR BLD MANUAL: 22.2 %
BUN SERPL-MCNC: 20 MG/DL (ref 8–22)
CALCIUM SERPL-MCNC: 7.8 MG/DL (ref 8.5–10.5)
CHLORIDE SERPL-SCNC: 101 MMOL/L (ref 96–112)
CO2 SERPL-SCNC: 22 MMOL/L (ref 20–33)
CREAT SERPL-MCNC: 0.6 MG/DL (ref 0.5–1.4)
EOSINOPHIL # BLD AUTO: 0 K/UL (ref 0–0.51)
EOSINOPHIL NFR BLD: 0 % (ref 0–6.9)
ERYTHROCYTE [DISTWIDTH] IN BLOOD BY AUTOMATED COUNT: 92.5 FL (ref 35.9–50)
GLUCOSE SERPL-MCNC: 92 MG/DL (ref 65–99)
HCT VFR BLD AUTO: 23.1 % (ref 42–52)
HGB BLD-MCNC: 7.4 G/DL (ref 14–18)
HYPOCHROMIA BLD QL SMEAR: ABNORMAL
LYMPHOCYTES # BLD AUTO: 22.05 K/UL (ref 1–4.8)
LYMPHOCYTES NFR BLD: 49.1 % (ref 22–41)
MACROCYTES BLD QL SMEAR: ABNORMAL
MANUAL DIFF BLD: NORMAL
MCH RBC QN AUTO: 33.9 PG (ref 27–33)
MCHC RBC AUTO-ENTMCNC: 32 G/DL (ref 33.7–35.3)
MCV RBC AUTO: 106 FL (ref 81.4–97.8)
MONOCYTES # BLD AUTO: 5.84 K/UL (ref 0–0.85)
MONOCYTES NFR BLD AUTO: 13 % (ref 0–13.4)
MORPHOLOGY BLD-IMP: NORMAL
NEUTROPHILS # BLD AUTO: 7.05 K/UL (ref 1.82–7.42)
NEUTROPHILS NFR BLD: 15.7 % (ref 44–72)
NRBC # BLD AUTO: 3.31 K/UL
NRBC BLD-RTO: 7.4 /100 WBC
OVALOCYTES BLD QL SMEAR: NORMAL
PLATELET # BLD AUTO: 54 K/UL (ref 164–446)
PLATELET BLD QL SMEAR: NORMAL
PLATELETS.RETICULATED NFR BLD AUTO: 33 K/UL (ref 0.6–13.1)
POIKILOCYTOSIS BLD QL SMEAR: NORMAL
POLYCHROMASIA BLD QL SMEAR: NORMAL
POTASSIUM SERPL-SCNC: 4.1 MMOL/L (ref 3.6–5.5)
RBC # BLD AUTO: 2.18 M/UL (ref 4.7–6.1)
RBC BLD AUTO: PRESENT
SODIUM SERPL-SCNC: 131 MMOL/L (ref 135–145)
TARGETS BLD QL SMEAR: NORMAL
WBC # BLD AUTO: 44.9 K/UL (ref 4.8–10.8)

## 2021-10-22 PROCEDURE — 700102 HCHG RX REV CODE 250 W/ 637 OVERRIDE(OP): Performed by: STUDENT IN AN ORGANIZED HEALTH CARE EDUCATION/TRAINING PROGRAM

## 2021-10-22 PROCEDURE — 700101 HCHG RX REV CODE 250: Performed by: INTERNAL MEDICINE

## 2021-10-22 PROCEDURE — 36415 COLL VENOUS BLD VENIPUNCTURE: CPT

## 2021-10-22 PROCEDURE — 770004 HCHG ROOM/CARE - ONCOLOGY PRIVATE *

## 2021-10-22 PROCEDURE — B548ZZA ULTRASONOGRAPHY OF SUPERIOR VENA CAVA, GUIDANCE: ICD-10-PCS | Performed by: STUDENT IN AN ORGANIZED HEALTH CARE EDUCATION/TRAINING PROGRAM

## 2021-10-22 PROCEDURE — 700102 HCHG RX REV CODE 250 W/ 637 OVERRIDE(OP): Performed by: NURSE PRACTITIONER

## 2021-10-22 PROCEDURE — 85055 RETICULATED PLATELET ASSAY: CPT

## 2021-10-22 PROCEDURE — A9270 NON-COVERED ITEM OR SERVICE: HCPCS | Performed by: NURSE PRACTITIONER

## 2021-10-22 PROCEDURE — 80048 BASIC METABOLIC PNL TOTAL CA: CPT

## 2021-10-22 PROCEDURE — 94760 N-INVAS EAR/PLS OXIMETRY 1: CPT

## 2021-10-22 PROCEDURE — 700102 HCHG RX REV CODE 250 W/ 637 OVERRIDE(OP): Performed by: INTERNAL MEDICINE

## 2021-10-22 PROCEDURE — A9270 NON-COVERED ITEM OR SERVICE: HCPCS | Performed by: STUDENT IN AN ORGANIZED HEALTH CARE EDUCATION/TRAINING PROGRAM

## 2021-10-22 PROCEDURE — A9270 NON-COVERED ITEM OR SERVICE: HCPCS | Performed by: GENERAL PRACTICE

## 2021-10-22 PROCEDURE — 700101 HCHG RX REV CODE 250: Performed by: HOSPITALIST

## 2021-10-22 PROCEDURE — 94640 AIRWAY INHALATION TREATMENT: CPT

## 2021-10-22 PROCEDURE — 02HV33Z INSERTION OF INFUSION DEVICE INTO SUPERIOR VENA CAVA, PERCUTANEOUS APPROACH: ICD-10-PCS | Performed by: STUDENT IN AN ORGANIZED HEALTH CARE EDUCATION/TRAINING PROGRAM

## 2021-10-22 PROCEDURE — 85027 COMPLETE CBC AUTOMATED: CPT

## 2021-10-22 PROCEDURE — 99232 SBSQ HOSP IP/OBS MODERATE 35: CPT | Performed by: NURSE PRACTITIONER

## 2021-10-22 PROCEDURE — 85007 BL SMEAR W/DIFF WBC COUNT: CPT

## 2021-10-22 PROCEDURE — C1751 CATH, INF, PER/CENT/MIDLINE: HCPCS

## 2021-10-22 PROCEDURE — A9270 NON-COVERED ITEM OR SERVICE: HCPCS | Performed by: INTERNAL MEDICINE

## 2021-10-22 PROCEDURE — 700102 HCHG RX REV CODE 250 W/ 637 OVERRIDE(OP): Performed by: GENERAL PRACTICE

## 2021-10-22 RX ORDER — LORATADINE 10 MG/1
10 TABLET ORAL
Status: DISCONTINUED | OUTPATIENT
Start: 2021-10-22 | End: 2021-10-23

## 2021-10-22 RX ORDER — ECHINACEA PURPUREA EXTRACT 125 MG
2 TABLET ORAL
Status: DISCONTINUED | OUTPATIENT
Start: 2021-10-22 | End: 2021-11-05 | Stop reason: HOSPADM

## 2021-10-22 RX ORDER — PSEUDOEPHEDRINE HYDROCHLORIDE 60 MG/1
60 TABLET, FILM COATED ORAL EVERY 6 HOURS PRN
Status: DISCONTINUED | OUTPATIENT
Start: 2021-10-22 | End: 2021-11-05 | Stop reason: HOSPADM

## 2021-10-22 RX ADMIN — MICROFIBRILLAR COLLAGEN HEMOSTAT POWDER 1 EACH: POWDER at 02:58

## 2021-10-22 RX ADMIN — LORATADINE 10 MG: 10 TABLET ORAL at 21:17

## 2021-10-22 RX ADMIN — TRAZODONE HYDROCHLORIDE 50 MG: 100 TABLET ORAL at 21:16

## 2021-10-22 RX ADMIN — Medication 2 SPRAY: at 11:04

## 2021-10-22 RX ADMIN — ALLOPURINOL 300 MG: 300 TABLET ORAL at 04:14

## 2021-10-22 RX ADMIN — FLECAINIDE ACETATE 50 MG: 50 TABLET ORAL at 04:15

## 2021-10-22 RX ADMIN — DOCUSATE SODIUM 50 MG AND SENNOSIDES 8.6 MG 2 TABLET: 8.6; 5 TABLET, FILM COATED ORAL at 09:57

## 2021-10-22 RX ADMIN — FLECAINIDE ACETATE 50 MG: 50 TABLET ORAL at 18:29

## 2021-10-22 RX ADMIN — IPRATROPIUM BROMIDE AND ALBUTEROL SULFATE 3 ML: .5; 2.5 SOLUTION RESPIRATORY (INHALATION) at 00:19

## 2021-10-22 RX ADMIN — PSEUDOEPHEDRINE HYDROCHLORIDE 60 MG: 60 TABLET ORAL at 11:04

## 2021-10-22 RX ADMIN — DOCUSATE SODIUM 50 MG AND SENNOSIDES 8.6 MG 2 TABLET: 8.6; 5 TABLET, FILM COATED ORAL at 18:27

## 2021-10-22 ASSESSMENT — ENCOUNTER SYMPTOMS
POLYDIPSIA: 0
DEPRESSION: 0
SEIZURES: 0
FOCAL WEAKNESS: 0
DIAPHORESIS: 0
HEMOPTYSIS: 0
BLOOD IN STOOL: 0
SORE THROAT: 0
DIARRHEA: 0
LOSS OF CONSCIOUSNESS: 0
SHORTNESS OF BREATH: 0
COUGH: 0
NAUSEA: 0
BRUISES/BLEEDS EASILY: 0
DIZZINESS: 0
CHILLS: 0
WEIGHT LOSS: 0
HEADACHES: 0
MYALGIAS: 0
VOMITING: 0
PALPITATIONS: 0
WHEEZING: 0
BLURRED VISION: 0
FEVER: 0
PND: 0
ABDOMINAL PAIN: 0

## 2021-10-22 NOTE — CARE PLAN
The patient is Watcher - Medium risk of patient condition declining or worsening    Shift Goals  Clinical Goals: no bleed from biopsy site/PICC placement. oxygen monitoring/ fall precautions/ ambulate/ monitor lab values  Patient Goals: rest/ walk to bathroom without oxygen    Progress made toward(s) clinical / shift goals:  yes      Problem: Knowledge Deficit - Standard  Goal: Patient and family/care givers will demonstrate understanding of plan of care, disease process/condition, diagnostic tests and medications  Outcome: Progressing  Note:   Monitor bone marrow site for prev bleeding - dressing cdi.  Picc placement and education - consent  No shower per doctor today  Titrate off oxygen for day use - pt uses for noc.  Placed oxygen extension to reach the bathroom.         Problem: Fall Risk  Goal: Patient will remain free from falls  Outcome: Progressing     Problem: Mobility  Goal: Patient's capacity to carry out activities will improve  Outcome: Progressing     Problem: Self Care  Goal: Patient will have the ability to perform ADLs independently or with assistance (bathe, groom, dress, toilet and feed)  Outcome: Progressing

## 2021-10-22 NOTE — PROGRESS NOTES
Pt had BMB on 10/19. Has been having continued bleeding/oozing out of the site which has not yet stopped.   Has had bleeding issues for the past couple of months with intractable epistaxis  Examined patient at bedside and discussed w/ pt, RN, family member  Did soak through multiple ABD pads today  Now has 10 lbs weight and bleeding is slowing  CBC stable / PT INR slightly elevated  Will hold off on transfusion now that bleeding is controlled but will continue to monitor and reevaluate for need of transfusion if bleeding resumes.

## 2021-10-22 NOTE — PROCEDURES
Vascular Access Team     Date of Insertion: 10/22/21  Arm Circumference: 25  Internal length: 43  External Length: 0  Vein Occupancy %: 45   Reason for PICC: chemo  Labs: WBC 44.9, PLT 54, BUN 20, Cr 0.60, GFR >60, INR 1.30     Consents confirmed, vessel patency confirmed with ultrasound. Risks and benefits of procedure explained to patient and education regarding central line associated bloodstream infections provided. Questions answered.      PICC placed in RUE per licensed provider order with ultrasound guidance.  5 Fr, 02 lumen PICC placed in basilic vein after 01 attempt(s). 2 mL of 1% lidocaine injected intradermally at the insertion site. A 21 gauge microintroducer needle and modified Seldinger technique was used to obtain access to the vein. 43 cm catheter inserted and brisk blood return was observed from each lumen upon aspiration. Line secured at the 0 cm marker. TCS stylet removed and observed to be fully intact. Each lumen flushed using pulsatile method without resistance with 10 mL 0.9% normal saline. PICC line secured with Biopatch and Tegaderm.     PICC tip placement location is confirmed by nurse to be in the Superior Vena Cava (SVC) utilizing 3CG technology. PICC line is appropriate for use at this time. Patient tolerated procedure well, without complications.  Patient condition relayed to primary RN or ordering physician via this post procedure note in the EMR.      Ultrasound images uploaded to PACS and viewable in the EMR - yes  Ultrasound imaged printed and placed in paper chart - no     Xipin Power PICC ref # [id number], Lot # [id number], Expiration Date 08/31/22

## 2021-10-22 NOTE — PROGRESS NOTES
Patient unable to tolerate laying on stomach with weight bag on back. Weight taken off, bleeding resumed, 1 ABD pad soaked. MD paged orders received for Microfibrillar collagen.

## 2021-10-22 NOTE — PROGRESS NOTES
Patient bleeding from BMB site on his right posterior hip. Two ABD pads soaked since 1900. Patient currently laying on stomach with 10lb weight to site.  Vitals stable last /67. MD paged and updated on new bleeding. New orders received for PT time, and CBC.

## 2021-10-22 NOTE — ASSESSMENT & PLAN NOTE
W hoarseness and dry cough -- present since last COVID infection; likely post-infectous  Sudafed as needed and follow blood pressure  Humidified O2  Ocean mist nasal spray as needed  Antihistamine PRN  10/24: Repeat COVID PCR negative. CXR clear.  10/25 Mucinex  10/27:  Much improved with benadryl and robitussin.   10/28:  Worse today.  Trial allegra.   10/29:  Improving.  11/2: resolved

## 2021-10-22 NOTE — PROGRESS NOTES
[facility] Medicine Daily Progress Note    Date of Service  10/21/2021    Chief Complaint  Alfonso Gaspar is a 76 y.o. male admitted 10/18/2021 with leukocytosis.     Hospital Course  This is a 76 year old male with PMHx of atrial fibrillation (s/p cardioversion, off AC, Dr. Low), hypertension, hyperlipidemia, and gout who was admitted on 10/18/2021 due to abnormal labs. Patient was seen by cardiology, Dr. Low, patient had a cardioversion in September 2021, patient returned to [facility], patient taken off of Eliquis due to recurrent nosebleeds.  Patient is currently on flecainide.  Patient had 4-5 ER visits due to this recurrent epistaxis, requiring packing, cauterization, and surgical procedure performed by ENT.  Patient is currently off of any anticoagulation.    Patient was sent in by PCP due to findings of leukocytosis, anemia, and thrombocytopenia on repeat labs. Labs on admission noted hemoglobin of 7.4, patient was transfused 1 unit, hemoglobin remained at 7.3.  We will continue with serial hemoglobins and transfuse if less than 7. Oncology was consulted, they do recommend inpatient bone marrow biopsy, this was performed 10/19, by my colleague, Dr. Arthur Caruso.  Oncology following.    Interval Problem Update  10/21: Hg 6.6 Transfused overnight. WBC 48.5. Plt 63. Na 132. Fibrinogen OK. Hep panel benign. HIV NR. Discussed w Onc. Initiate/monitor induction when cytology, FISH, etc.,finalized. No complaints beyond situational depression. Declined SI. Declined speaking w psych or clergy. Has supportive family. EKG interpreted by me w SR 1st deg block.     10/20: Patient was seen and examined at bedside.  No acute events overnight. Patient is resting comfortably in bed and in no acute distress.     Transfer to oncology  Bone marrow biopsy completed 10/19  Await bone marrow results  WBC 28.1 --> 43.5  Oncology recommendations appreciated    Consultants/Specialty  oncology    Code Status  Full  Code    Disposition  Patient is not medically cleared.   Anticipate discharge to to home with close outpatient follow-up.  I have placed the appropriate orders for post-discharge needs.    Review of Systems  Review of Systems   Constitutional: Negative for chills, diaphoresis, fever and weight loss.   HENT: Negative for sore throat.    Respiratory: Negative for cough, shortness of breath and wheezing.    Cardiovascular: Negative for chest pain, palpitations and PND.   Gastrointestinal: Negative for abdominal pain, blood in stool, diarrhea, melena, nausea and vomiting.   Genitourinary: Negative for dysuria, hematuria and urgency.   Musculoskeletal: Negative for joint pain and myalgias.   Skin: Negative for rash.   Neurological: Negative for dizziness, focal weakness, seizures and loss of consciousness.   Endo/Heme/Allergies: Does not bruise/bleed easily.   Psychiatric/Behavioral: Positive for depression.        Physical Exam  Temp:  [36.3 °C (97.4 °F)-37.1 °C (98.7 °F)] 36.8 °C (98.3 °F)  Pulse:  [] 86  Resp:  [17-20] 18  BP: ()/(40-66) 114/66  SpO2:  [90 %-96 %] 95 %    Physical Exam  Vitals and nursing note reviewed.   Constitutional:       Appearance: He is obese. He is not ill-appearing.   HENT:      Head: Normocephalic and atraumatic.      Nose: Nose normal.   Eyes:      Conjunctiva/sclera: Conjunctivae normal.      Pupils: Pupils are equal, round, and reactive to light.   Cardiovascular:      Rate and Rhythm: Normal rate and regular rhythm.      Heart sounds: No murmur heard.   No friction rub.   Pulmonary:      Effort: No respiratory distress.   Abdominal:      General: Bowel sounds are normal. There is no distension.      Palpations: Abdomen is soft.   Musculoskeletal:      Cervical back: Neck supple.   Skin:     General: Skin is warm and dry.   Neurological:      Mental Status: He is alert.   Psychiatric:         Mood and Affect: Affect is tearful.         Fluids  No intake or output data in the  24 hours ending 10/21/21 1724    Laboratory  Recent Labs     10/19/21  1958 10/20/21  0317 10/21/21  0037   WBC 28.1* 43.5* 48.5*   RBC 2.22* 2.09* 1.94*   HEMOGLOBIN 7.8* 7.3* 6.6*   HEMATOCRIT 24.0* 22.5* 21.5*   .1* 107.7* 110.8*   MCH 35.1* 34.9* 34.0*   MCHC 32.5* 32.4* 30.7*   .1* 106.0* 108.5*   PLATELETCT 68* 50* 63*     Recent Labs     10/18/21  2203 10/20/21  0316 10/21/21  0037   SODIUM 131* 133* 132*   POTASSIUM 4.9 4.2 4.6   CHLORIDE 100 104 102   CO2 21 22 24   GLUCOSE 105* 82 101*   BUN 24* 18 21   CREATININE 0.58 0.42* 0.42*   CALCIUM 8.1* 7.8* 7.8*     Recent Labs     10/18/21  2203   APTT 37.4*   INR 1.29*         Recent Labs     10/20/21  0316   TRIGLYCERIDE 94   HDL 19*   LDL 27       Imaging  No orders to display        Assessment/Plan  * AML (acute myeloblastic leukemia) (HCC)  Assessment & Plan  Based on Onc interpretation of BMB  Pending final pathology with cytogenetics, FISH panel for AML as well as MDS, and other PCR testing.  10/21 PICC    Hyponatremia  Assessment & Plan  Na 133  Monitor    Elevated LDH  Assessment & Plan    Monitor following induction    Thrombocytopenia (HCC)  Assessment & Plan  Not currently bleeding, no epistaxis  Tranfuse platelets prn  Likely secondary to marrow dysfunction  Daily CBC    Leukocytosis- (present on admission)  Assessment & Plan  Likely secondary to Leukemia  S/p bone marrow biopsy 10/19/2021 by Dr. Arthur Caruso  Oncology consulted, Dr. ALEX Anguiano   Daily CBC w diff      Atrial fibrillation (HCC)- (present on admission)  Assessment & Plan  Patient follows up with Dr. Low  Currently had a cardioversion in September 2021  As per cardiology no longer on anticoagulation  Patient to continue with flecainide  10/21: EKG - SR    Anemia- (present on admission)  Assessment & Plan  Suspected marrow process such as Leukemia  S/p bone marrow biopsy 10/19/2021 by Dr. Arthur Caruso  Oncology consulted, Dr. ALEX Anguiano     Required 1 unit PRBC on  10/18/2021  Monitor with serial hemoglobins, transfuse if hemoglobin is less than 7  10/21 Transfused; daily CBC w diff    Essential hypertension, benign- (present on admission)  Assessment & Plan  Continue Home Medications  Labetalol ivp prn with parameters         VTE prophylaxis: SCDs/TEDs and pharmacologic prophylaxis contraindicated due to low platelets    I have performed a physical exam and reviewed and updated ROS and Plan today (10/21/2021). In review of yesterday's note (10/20/2021), there are no changes except as documented above.

## 2021-10-22 NOTE — PROGRESS NOTES
HEMATOLOGY-ONCOLOGY PROGRESS NOTE    Date of Service: 10/22/2021     Reason for Consult: Notable leukocytosis     HPI: Patient is a 76 year old male with a PMH of COVID infection (7/2021), OA w/ b/l knee replacement, A-fib s/p cardioversion and no anticoagulation, splenectomy and gout who presented to the hospital after he was found to have notably elevated WBC.     Reports that he had a Covid infection back in July of this year and ever since then has been experiencing more fatigue. Patient reports that over the past 2 months he is started experiencing night sweats where he woke up drenched in sweat along with recurrent episodes of epistaxis requiring blood transfusions and cauterization. Patient also reports having bleeding from his gums during this time. Patient is unsure of weight loss but he thinks he might have had some weight loss due to poor appetite recently.     Patient denies any notable family history of cancer. Reports that his sister had lung cancer but was a smoker. Patient reports that he smoked until age 34 for combined pack-year history of 7.5 years. Denies any history of heavy alcohol use or drug use. He says that he used Vader roughly once per year on his lawn, otherwise cannot think of any other exposure to toxic chemicals. 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     Patient was found to have a white count of 44.9 admission along with hemoglobin level of 7.4 and platelet level of 77. He underwent a bone marrow biopsy after admission.    Events: Platelets down trending to 54 and hemoglobin of 7.4 today. WBC stable. Patient received picc line.    Subjective: Patient denies any acute complaints overnight. Patient reports episode of bleeding from biopsy site, which resolved overnight. Denies any fevers or chills.  Objective:  Medications reviewed and notable for:  Current Facility-Administered Medications   Medication Dose   • pseudoephedrine (SUDAFED) tablet 60 mg  60 mg   • sodium  "chloride (OCEAN) 0.65 % nasal spray 2 Spray  2 Spray   • ipratropium-albuterol (DUONEB) nebulizer solution  3 mL   • enalapril (VASOTEC) tablet 10 mg  10 mg   • senna-docusate (PERICOLACE or SENOKOT S) 8.6-50 MG per tablet 2 Tablet  2 Tablet    And   • polyethylene glycol/lytes (MIRALAX) PACKET 1 Packet  1 Packet    And   • magnesium hydroxide (MILK OF MAGNESIA) suspension 30 mL  30 mL    And   • bisacodyl (DULCOLAX) suppository 10 mg  10 mg   • acetaminophen (Tylenol) tablet 650 mg  650 mg   • enalaprilat (Vasotec) injection 1.25 mg 1 mL  1.25 mg   • labetalol (NORMODYNE/TRANDATE) injection 10 mg  10 mg   • ondansetron (ZOFRAN) syringe/vial injection 4 mg  4 mg   • ondansetron (ZOFRAN ODT) dispertab 4 mg  4 mg   • glucose 4 g chewable tablet 16 g  16 g    And   • dextrose 50% (D50W) injection 50 mL  50 mL   • flecainide (TAMBOCOR) tablet 50 mg  50 mg   • influenza Vac High-Dose Quad (Fluzone) injection 0.7 mL  0.7 mL   • traZODone (DESYREL) tablet 50 mg  50 mg   • allopurinol (ZYLOPRIM) tablet 300 mg  300 mg     Review of Systems   Constitutional: Negative for chills and fever.   HENT: Positive for hearing loss. Negative for ear pain and tinnitus.    Eyes: Negative for blurred vision.   Respiratory: Negative for cough, hemoptysis and shortness of breath.    Cardiovascular: Negative for chest pain and palpitations.   Skin: Negative for itching and rash.   Neurological: Negative for dizziness and headaches.   Endo/Heme/Allergies: Negative for polydipsia. Does not bruise/bleed easily.       /66   Pulse 80   Temp 37.1 °C (98.8 °F) (Temporal)   Resp 18   Ht 1.778 m (5' 10\")   Wt 77.9 kg (171 lb 11.8 oz)   SpO2 99%     Physical Exam  Constitutional:       General: He is not in acute distress.     Appearance: Normal appearance. He is not ill-appearing, toxic-appearing or diaphoretic.   HENT:      Head: Normocephalic and atraumatic.      Mouth/Throat:      Mouth: Mucous membranes are moist.      Pharynx: " Oropharynx is clear. No oropharyngeal exudate or posterior oropharyngeal erythema.   Eyes:      General: No scleral icterus.        Right eye: No discharge.         Left eye: No discharge.      Conjunctiva/sclera: Conjunctivae normal.   Cardiovascular:      Rate and Rhythm: Normal rate and regular rhythm.      Pulses: Normal pulses.      Heart sounds: Normal heart sounds. No murmur heard.   No gallop.    Pulmonary:      Effort: Pulmonary effort is normal. No respiratory distress.      Breath sounds: Normal breath sounds.   Chest:      Chest wall: No tenderness.   Abdominal:      General: Abdomen is flat. Bowel sounds are normal. There is no distension.      Palpations: Abdomen is soft. There is no mass.   Skin:     Coloration: Skin is not jaundiced or pale.   Neurological:      General: No focal deficit present.      Mental Status: He is alert and oriented to person, place, and time.   Psychiatric:         Mood and Affect: Mood normal.         Behavior: Behavior normal.         Labs reviewed and notable for:  Recent Labs     10/21/21  0037 10/21/21  2224 10/22/21  0524   WBC 48.5* 50.6* 44.9*   RBC 1.94* 2.22* 2.18*   HEMOGLOBIN 6.6* 7.7* 7.4*   HEMATOCRIT 21.5* 23.5* 23.1*   .8* 105.9* 106.0*   MCH 34.0* 34.7* 33.9*   MCHC 30.7* 32.8* 32.0*   .5* 93.5* 92.5*   PLATELETCT 63* 58* 54*     Recent Labs     10/21/21  2224   INR 1.30*     .@CMP  Recent Results (from the past 24 hour(s))   CBC WITH DIFFERENTIAL    Collection Time: 10/21/21 10:24 PM   Result Value Ref Range    WBC 50.6 (HH) 4.8 - 10.8 K/uL    RBC 2.22 (L) 4.70 - 6.10 M/uL    Hemoglobin 7.7 (L) 14.0 - 18.0 g/dL    Hematocrit 23.5 (L) 42.0 - 52.0 %    .9 (H) 81.4 - 97.8 fL    MCH 34.7 (H) 27.0 - 33.0 pg    MCHC 32.8 (L) 33.7 - 35.3 g/dL    RDW 93.5 (H) 35.9 - 50.0 fL    Platelet Count 58 (L) 164 - 446 K/uL    Neutrophils-Polys 5.10 (L) 44.00 - 72.00 %    Lymphocytes 46.10 (H) 22.00 - 41.00 %    Monocytes 0.90 0.00 - 13.40 %     Eosinophils 0.00 0.00 - 6.90 %    Basophils 0.00 0.00 - 1.80 %    Nucleated RBC 7.00 /100 WBC    Neutrophils (Absolute) 2.58 1.82 - 7.42 K/uL    Lymphs (Absolute) 23.33 (H) 1.00 - 4.80 K/uL    Monos (Absolute) 0.46 0.00 - 0.85 K/uL    Eos (Absolute) 0.00 0.00 - 0.51 K/uL    Baso (Absolute) 0.00 0.00 - 0.12 K/uL    NRBC (Absolute) 3.54 K/uL    Anisocytosis 2+ (A)     Macrocytosis 2+ (A)     Microcytosis 1+    Prothrombin Time    Collection Time: 10/21/21 10:24 PM   Result Value Ref Range    PT 15.8 (H) 12.0 - 14.6 sec    INR 1.30 (H) 0.87 - 1.13   DIFFERENTIAL MANUAL    Collection Time: 10/21/21 10:24 PM   Result Value Ref Range    Blasts 47.90 %    Manual Diff Status PERFORMED    PERIPHERAL SMEAR REVIEW    Collection Time: 10/21/21 10:24 PM   Result Value Ref Range    Peripheral Smear Review see below    PLATELET ESTIMATE    Collection Time: 10/21/21 10:24 PM   Result Value Ref Range    Plt Estimation Decreased    MORPHOLOGY    Collection Time: 10/21/21 10:24 PM   Result Value Ref Range    RBC Morphology Present     Polychromia 1+     Smudge Cells Moderate    Basic Metabolic Panel    Collection Time: 10/22/21  5:24 AM   Result Value Ref Range    Sodium 131 (L) 135 - 145 mmol/L    Potassium 4.1 3.6 - 5.5 mmol/L    Chloride 101 96 - 112 mmol/L    Co2 22 20 - 33 mmol/L    Glucose 92 65 - 99 mg/dL    Bun 20 8 - 22 mg/dL    Creatinine 0.60 0.50 - 1.40 mg/dL    Calcium 7.8 (L) 8.5 - 10.5 mg/dL    Anion Gap 8.0 7.0 - 16.0   CBC WITH DIFFERENTIAL    Collection Time: 10/22/21  5:24 AM   Result Value Ref Range    WBC 44.9 (HH) 4.8 - 10.8 K/uL    RBC 2.18 (L) 4.70 - 6.10 M/uL    Hemoglobin 7.4 (L) 14.0 - 18.0 g/dL    Hematocrit 23.1 (L) 42.0 - 52.0 %    .0 (H) 81.4 - 97.8 fL    MCH 33.9 (H) 27.0 - 33.0 pg    MCHC 32.0 (L) 33.7 - 35.3 g/dL    RDW 92.5 (H) 35.9 - 50.0 fL    Platelet Count 54 (L) 164 - 446 K/uL    Neutrophils-Polys 15.70 (L) 44.00 - 72.00 %    Lymphocytes 49.10 (H) 22.00 - 41.00 %    Monocytes 13.00 0.00 -  13.40 %    Eosinophils 0.00 0.00 - 6.90 %    Basophils 0.00 0.00 - 1.80 %    Nucleated RBC 7.40 /100 WBC    Neutrophils (Absolute) 7.05 1.82 - 7.42 K/uL    Lymphs (Absolute) 22.05 (H) 1.00 - 4.80 K/uL    Monos (Absolute) 5.84 (H) 0.00 - 0.85 K/uL    Eos (Absolute) 0.00 0.00 - 0.51 K/uL    Baso (Absolute) 0.00 0.00 - 0.12 K/uL    NRBC (Absolute) 3.31 K/uL    Hypochromia 1+     Anisocytosis 2+ (A)     Macrocytosis 2+ (A)    DIFFERENTIAL MANUAL    Collection Time: 10/22/21  5:24 AM   Result Value Ref Range    Blasts 22.20 %    Manual Diff Status PERFORMED    PERIPHERAL SMEAR REVIEW    Collection Time: 10/22/21  5:24 AM   Result Value Ref Range    Peripheral Smear Review see below    PLATELET ESTIMATE    Collection Time: 10/22/21  5:24 AM   Result Value Ref Range    Plt Estimation Decreased    MORPHOLOGY    Collection Time: 10/22/21  5:24 AM   Result Value Ref Range    RBC Morphology Present     Polychromia 2+     Poikilocytosis 1+     Ovalocytes 1+     Target Cells 1+    IMMATURE PLT FRACTION    Collection Time: 10/22/21  5:24 AM   Result Value Ref Range    Imm. Plt Fraction 33.0 (H) 0.6 - 13.1 K/uL   ESTIMATED GFR    Collection Time: 10/22/21  5:24 AM   Result Value Ref Range    GFR If African American >60 >60 mL/min/1.73 m 2    GFR If Non African American >60 >60 mL/min/1.73 m 2       Diagnostic imaging:   IR-PICC LINE PLACEMENT W/ GUIDANCE > AGE 5    (Results Pending)         Assessment and Recommendations: Patient is a 76 year old male with a PMH of COVID infection (7/2021), OA w/ b/l knee replacement, A-fib s/p cardioversion and no anticoagulation, splenectomy and gout who presented to the hospital after he was found to have notably elevated WBC. Patient was found to have a white count of 44.9 admission along with hemoglobin level of 7.4 and platelet level of 77.  This is all new from 3/2021.     Plan  -Repeat CBC showing leukocytosis inline with first CBC, B12 elevated, folate normal, and fibrinogen wnl.  -Bone  marrow biopsy results pending.  -Consider starting Hydrea depending on trend of blood counts.  -Continue IV fluid resuscitation.  -Continue allopurinol.  -CMV ab neg.  -Patient received PICC line today.  -Continue to monitor patient's for possible transfusion needs (hemoglobin <7 and platelets <10k or bleeding, all products should be cmv neg, irradiated).        High complexity     Sultan GAYATHRI Ku M.D., The patient was seen and plan discussed with my attending,     Smith Anguiano MD  [facility]

## 2021-10-22 NOTE — PROGRESS NOTES
COVID 19 surge in effect   Lipids look very good - improved since last year.  10 year heart disease risk is low at 1%.    Eboni Khan M.D.      The 10-year ASCVD risk score (Daniel PADGETT Jr, et al., 2013) is: 1.1%    Values used to calculate the score:      Age: 41 years      Sex: Male      Is Non- : No      Diabetic: No      Tobacco smoker: No      Systolic Blood Pressure: 116 mmHg      Is BP treated: No      HDL Cholesterol: 44 mg/dL      Total Cholesterol: 175 mg/dL

## 2021-10-22 NOTE — CARE PLAN
Problem: Knowledge Deficit - Standard  Goal: Patient and family/care givers will demonstrate understanding of plan of care, disease process/condition, diagnostic tests and medications  Outcome: Progressing     Problem: Fall Risk  Goal: Patient will remain free from falls  Outcome: Progressing   The patient is Watcher - Medium risk of patient condition declining or worsening    COVID-19 surge in effect

## 2021-10-22 NOTE — PROGRESS NOTES
[facility] Medicine Daily Progress Note    Date of Service  10/22/2021    Chief Complaint  Alfonso Gaspar is a 76 y.o. male admitted 10/18/2021 with leukocytosis.     Hospital Course  This is a 76 year old male with PMHx of atrial fibrillation (s/p cardioversion, off AC, Dr. Low), hypertension, hyperlipidemia, and gout who was admitted on 10/18/2021 due to abnormal labs. Patient was seen by cardiology, Dr. Low, patient had a cardioversion in September 2021, patient returned to [facility], patient taken off of Eliquis due to recurrent nosebleeds.  Patient is currently on flecainide.  Patient had 4-5 ER visits due to this recurrent epistaxis, requiring packing, cauterization, and surgical procedure performed by ENT.  Patient is currently off of any anticoagulation.    Patient was sent in by PCP due to findings of leukocytosis, anemia, and thrombocytopenia on repeat labs. Labs on admission noted hemoglobin of 7.4, patient was transfused 1 unit, hemoglobin remained at 7.3.  We will continue with serial hemoglobins and transfuse if less than 7. Oncology was consulted, they do recommend inpatient bone marrow biopsy, this was performed 10/19, by my colleague, Dr. Arthur Caruso.  Oncology following.    Interval Problem Update  10/22 99% on 1.5 L via nasal cannula.  Blood pressure pulse and respiratory rate are benign.  Leukocytosis stable.  H/H 7.4/23, with macrocytosis.  Platelets 52 and stable.  Hyponatremia stable 131.  Complaining of nasal congestion postnasal drip and cough since his Covid infection which has since cleared.  No shortness of breath. Induction likely to start Mon.    10/21: Hg 6.6 Transfused overnight. WBC 48.5. Plt 63. Na 132. Fibrinogen OK. Hep panel benign. HIV NR. Discussed w Onc. Initiate/monitor induction when cytology, FISH, etc.,finalized. No complaints beyond situational depression. Declined SI. Declined speaking w psych or clergy. Has supportive family. EKG interpreted by me w SR 1st deg block.      10/20: Patient was seen and examined at bedside.  No acute events overnight. Patient is resting comfortably in bed and in no acute distress.     Transfer to oncology  Bone marrow biopsy completed 10/19  Await bone marrow results  WBC 28.1 --> 43.5  Oncology recommendations appreciated    Consultants/Specialty  oncology    Code Status  Full Code    Disposition  Patient is not medically cleared.   Anticipate discharge to to home with close outpatient follow-up.  I have placed the appropriate orders for post-discharge needs.    Review of Systems  Review of Systems   Constitutional: Negative for chills, diaphoresis, fever and weight loss.   HENT: Negative for sore throat.    Respiratory: Negative for cough, shortness of breath and wheezing.    Cardiovascular: Negative for chest pain, palpitations and PND.   Gastrointestinal: Negative for abdominal pain, blood in stool, diarrhea, melena, nausea and vomiting.   Genitourinary: Negative for dysuria, hematuria and urgency.   Musculoskeletal: Negative for joint pain and myalgias.   Skin: Negative for rash.   Neurological: Negative for dizziness, focal weakness, seizures and loss of consciousness.   Endo/Heme/Allergies: Does not bruise/bleed easily.   Psychiatric/Behavioral: Negative for depression.        Physical Exam  Temp:  [36 °C (96.8 °F)-37.6 °C (99.6 °F)] 37.1 °C (98.8 °F)  Pulse:  [80-94] 80  Resp:  [18] 18  BP: ()/(51-67) 102/66  SpO2:  [93 %-99 %] 99 %    Physical Exam  Vitals and nursing note reviewed.   Constitutional:       Appearance: He is obese. He is not ill-appearing.      Interventions: Nasal cannula in place.   HENT:      Head: Normocephalic and atraumatic.      Nose: Nose normal.   Eyes:      Conjunctiva/sclera: Conjunctivae normal.      Pupils: Pupils are equal, round, and reactive to light.   Cardiovascular:      Rate and Rhythm: Normal rate and regular rhythm.      Heart sounds: No murmur heard.   No friction rub.   Pulmonary:      Effort: No  respiratory distress.   Abdominal:      General: Bowel sounds are normal. There is no distension.      Palpations: Abdomen is soft.   Musculoskeletal:      Cervical back: Neck supple.   Skin:     General: Skin is warm and dry.   Neurological:      Mental Status: He is alert.   Psychiatric:         Attention and Perception: Attention normal.         Mood and Affect: Mood normal.         Fluids  No intake or output data in the 24 hours ending 10/22/21 1345    Laboratory  Recent Labs     10/21/21  0037 10/21/21  2224 10/22/21  0524   WBC 48.5* 50.6* 44.9*   RBC 1.94* 2.22* 2.18*   HEMOGLOBIN 6.6* 7.7* 7.4*   HEMATOCRIT 21.5* 23.5* 23.1*   .8* 105.9* 106.0*   MCH 34.0* 34.7* 33.9*   MCHC 30.7* 32.8* 32.0*   .5* 93.5* 92.5*   PLATELETCT 63* 58* 54*     Recent Labs     10/20/21  0316 10/21/21  0037 10/22/21  0524   SODIUM 133* 132* 131*   POTASSIUM 4.2 4.6 4.1   CHLORIDE 104 102 101   CO2 22 24 22   GLUCOSE 82 101* 92   BUN 18 21 20   CREATININE 0.42* 0.42* 0.60   CALCIUM 7.8* 7.8* 7.8*     Recent Labs     10/21/21  2224   INR 1.30*         Recent Labs     10/20/21  0316   TRIGLYCERIDE 94   HDL 19*   LDL 27       Imaging  IR-PICC LINE PLACEMENT W/ GUIDANCE > AGE 5   Final Result                  Ultrasound-guided PICC placement performed by qualified nursing staff as    above.               Assessment/Plan  * AML (acute myeloblastic leukemia) (HCC)  Assessment & Plan  Based on Onc interpretation of BMB  Pending final pathology with cytogenetics, FISH panel for AML as well as MDS, and other PCR testing.  10/21 PICC    Post-nasal drip  Assessment & Plan  W hoarseness and dry cough; present since last COVID infection; likely post-infectous  Sudafed as needed and follow blood pressure  Humidified O2  Ocean mist nasal spray as needed  Try antihistamine    Hyponatremia  Assessment & Plan  Na 133  Monitor    Elevated LDH  Assessment & Plan    Monitor following induction    Thrombocytopenia (HCC)  Assessment  & Plan  Not currently bleeding, no epistaxis  Tranfuse platelets prn  Likely secondary to marrow dysfunction  Daily CBC    Leukocytosis- (present on admission)  Assessment & Plan  Likely secondary to Leukemia  S/p bone marrow biopsy 10/19/2021 by Dr. Arthur Caruso  Oncology consulted, Dr. ALEX Anguiano   Daily CBC w diff      Atrial fibrillation (HCC)- (present on admission)  Assessment & Plan  Patient follows up with Dr. Low  Currently had a cardioversion in September 2021  As per cardiology no longer on anticoagulation  Patient to continue with flecainide  10/21: EKG - SR    Anemia- (present on admission)  Assessment & Plan  Suspected marrow process such as Leukemia  S/p bone marrow biopsy 10/19/2021 by Dr. Arthur Caruso  Oncology consulted, Dr. ALEX Anguiano     Required 1 unit PRBC on 10/18/2021  Monitor with serial hemoglobins, transfuse if hemoglobin is less than 7  10/21 Transfused; daily CBC w diff    Essential hypertension, benign- (present on admission)  Assessment & Plan  Continue Home Medications  Labetalol ivp prn with parameters       VTE prophylaxis: SCDs/TEDs and pharmacologic prophylaxis contraindicated due to low platelets    I have performed a physical exam and reviewed and updated ROS and Plan today (10/22/2021). In review of yesterday's note (10/21/2021), there are no changes except as documented above.

## 2021-10-23 LAB
ABO GROUP BLD: ABNORMAL
ANION GAP SERPL CALC-SCNC: 10 MMOL/L (ref 7–16)
ANISOCYTOSIS BLD QL SMEAR: ABNORMAL
BARCODED ABORH UBTYP: 5100
BARCODED ABORH UBTYP: 5100
BARCODED ABORH UBTYP: 9500
BARCODED ABORH UBTYP: 9500
BARCODED PRD CODE UBPRD: ABNORMAL
BARCODED UNIT NUM UBUNT: ABNORMAL
BASOPHILS # BLD AUTO: 0 % (ref 0–1.8)
BASOPHILS # BLD: 0 K/UL (ref 0–0.12)
BLASTS NFR BLD MANUAL: 33.9 %
BLD GP AB SCN SERPL QL: ABNORMAL
BUN SERPL-MCNC: 19 MG/DL (ref 8–22)
CALCIUM SERPL-MCNC: 7.7 MG/DL (ref 8.5–10.5)
CHLORIDE SERPL-SCNC: 102 MMOL/L (ref 96–112)
CO2 SERPL-SCNC: 21 MMOL/L (ref 20–33)
COMPONENT R 8504R: ABNORMAL
CREAT SERPL-MCNC: 0.55 MG/DL (ref 0.5–1.4)
EOSINOPHIL # BLD AUTO: 0 K/UL (ref 0–0.51)
EOSINOPHIL NFR BLD: 0 % (ref 0–6.9)
ERYTHROCYTE [DISTWIDTH] IN BLOOD BY AUTOMATED COUNT: 91.2 FL (ref 35.9–50)
GLUCOSE SERPL-MCNC: 85 MG/DL (ref 65–99)
HCT VFR BLD AUTO: 20.5 % (ref 42–52)
HGB BLD-MCNC: 6.5 G/DL (ref 14–18)
HSV1 GG IGG SER-ACNC: 36 IV
HSV1+2 IGG SER IA-ACNC: >22.4 IV
HSV2 GG IGG SER-ACNC: 0.07 IV
LYMPHOCYTES # BLD AUTO: 27.48 K/UL (ref 1–4.8)
LYMPHOCYTES NFR BLD: 56.2 % (ref 22–41)
MACROCYTES BLD QL SMEAR: ABNORMAL
MANUAL DIFF BLD: NORMAL
MCH RBC QN AUTO: 33.7 PG (ref 27–33)
MCHC RBC AUTO-ENTMCNC: 31.7 G/DL (ref 33.7–35.3)
MCV RBC AUTO: 106.2 FL (ref 81.4–97.8)
MICROCYTES BLD QL SMEAR: ABNORMAL
MONOCYTES # BLD AUTO: 1.22 K/UL (ref 0–0.85)
MONOCYTES NFR BLD AUTO: 2.5 % (ref 0–13.4)
MORPHOLOGY BLD-IMP: NORMAL
MYELOCYTES NFR BLD MANUAL: 0.8 %
NEUTROPHILS # BLD AUTO: 3.23 K/UL (ref 1.82–7.42)
NEUTROPHILS NFR BLD: 6.6 % (ref 44–72)
NRBC # BLD AUTO: 2.87 K/UL
NRBC BLD-RTO: 5.9 /100 WBC
OVALOCYTES BLD QL SMEAR: NORMAL
PLATELET # BLD AUTO: 45 K/UL (ref 164–446)
PLATELET BLD QL SMEAR: NORMAL
PLATELETS.RETICULATED NFR BLD AUTO: 26.4 K/UL (ref 0.6–13.1)
POIKILOCYTOSIS BLD QL SMEAR: NORMAL
POTASSIUM SERPL-SCNC: 4.6 MMOL/L (ref 3.6–5.5)
PRODUCT TYPE UPROD: ABNORMAL
RBC # BLD AUTO: 1.93 M/UL (ref 4.7–6.1)
RBC BLD AUTO: PRESENT
RH BLD: ABNORMAL
SODIUM SERPL-SCNC: 133 MMOL/L (ref 135–145)
UNIT STATUS USTAT: ABNORMAL
WBC # BLD AUTO: 48.9 K/UL (ref 4.8–10.8)

## 2021-10-23 PROCEDURE — 80048 BASIC METABOLIC PNL TOTAL CA: CPT

## 2021-10-23 PROCEDURE — 86850 RBC ANTIBODY SCREEN: CPT

## 2021-10-23 PROCEDURE — A9270 NON-COVERED ITEM OR SERVICE: HCPCS | Performed by: GENERAL PRACTICE

## 2021-10-23 PROCEDURE — 99232 SBSQ HOSP IP/OBS MODERATE 35: CPT | Performed by: NURSE PRACTITIONER

## 2021-10-23 PROCEDURE — 86900 BLOOD TYPING SEROLOGIC ABO: CPT

## 2021-10-23 PROCEDURE — A9270 NON-COVERED ITEM OR SERVICE: HCPCS | Performed by: STUDENT IN AN ORGANIZED HEALTH CARE EDUCATION/TRAINING PROGRAM

## 2021-10-23 PROCEDURE — 86945 BLOOD PRODUCT/IRRADIATION: CPT

## 2021-10-23 PROCEDURE — P9016 RBC LEUKOCYTES REDUCED: HCPCS

## 2021-10-23 PROCEDURE — 86901 BLOOD TYPING SEROLOGIC RH(D): CPT

## 2021-10-23 PROCEDURE — 85007 BL SMEAR W/DIFF WBC COUNT: CPT

## 2021-10-23 PROCEDURE — 700102 HCHG RX REV CODE 250 W/ 637 OVERRIDE(OP): Performed by: STUDENT IN AN ORGANIZED HEALTH CARE EDUCATION/TRAINING PROGRAM

## 2021-10-23 PROCEDURE — A9270 NON-COVERED ITEM OR SERVICE: HCPCS | Performed by: NURSE PRACTITIONER

## 2021-10-23 PROCEDURE — 86920 COMPATIBILITY TEST SPIN: CPT

## 2021-10-23 PROCEDURE — 770004 HCHG ROOM/CARE - ONCOLOGY PRIVATE *

## 2021-10-23 PROCEDURE — 85027 COMPLETE CBC AUTOMATED: CPT

## 2021-10-23 PROCEDURE — 85055 RETICULATED PLATELET ASSAY: CPT

## 2021-10-23 PROCEDURE — 700102 HCHG RX REV CODE 250 W/ 637 OVERRIDE(OP): Performed by: INTERNAL MEDICINE

## 2021-10-23 PROCEDURE — A9270 NON-COVERED ITEM OR SERVICE: HCPCS | Performed by: INTERNAL MEDICINE

## 2021-10-23 PROCEDURE — 700102 HCHG RX REV CODE 250 W/ 637 OVERRIDE(OP): Performed by: NURSE PRACTITIONER

## 2021-10-23 PROCEDURE — 36430 TRANSFUSION BLD/BLD COMPNT: CPT

## 2021-10-23 PROCEDURE — 700102 HCHG RX REV CODE 250 W/ 637 OVERRIDE(OP): Performed by: GENERAL PRACTICE

## 2021-10-23 RX ORDER — LORATADINE 10 MG/1
10 TABLET ORAL NIGHTLY PRN
Status: DISCONTINUED | OUTPATIENT
Start: 2021-10-23 | End: 2021-10-28

## 2021-10-23 RX ORDER — TRAZODONE HYDROCHLORIDE 100 MG/1
50 TABLET ORAL NIGHTLY PRN
Status: DISCONTINUED | OUTPATIENT
Start: 2021-10-23 | End: 2021-11-05 | Stop reason: HOSPADM

## 2021-10-23 RX ADMIN — ACETAMINOPHEN 650 MG: 325 TABLET ORAL at 05:05

## 2021-10-23 RX ADMIN — DOCUSATE SODIUM 50 MG AND SENNOSIDES 8.6 MG 2 TABLET: 8.6; 5 TABLET, FILM COATED ORAL at 05:05

## 2021-10-23 RX ADMIN — PSEUDOEPHEDRINE HYDROCHLORIDE 60 MG: 60 TABLET ORAL at 09:19

## 2021-10-23 RX ADMIN — ALLOPURINOL 300 MG: 300 TABLET ORAL at 05:05

## 2021-10-23 RX ADMIN — FLECAINIDE ACETATE 50 MG: 50 TABLET ORAL at 05:09

## 2021-10-23 RX ADMIN — TRAZODONE HYDROCHLORIDE 50 MG: 100 TABLET ORAL at 21:57

## 2021-10-23 RX ADMIN — DOCUSATE SODIUM 50 MG AND SENNOSIDES 8.6 MG 2 TABLET: 8.6; 5 TABLET, FILM COATED ORAL at 16:18

## 2021-10-23 RX ADMIN — Medication 2 SPRAY: at 16:18

## 2021-10-23 RX ADMIN — FLECAINIDE ACETATE 50 MG: 50 TABLET ORAL at 16:17

## 2021-10-23 RX ADMIN — PSEUDOEPHEDRINE HYDROCHLORIDE 60 MG: 60 TABLET ORAL at 21:57

## 2021-10-23 ASSESSMENT — ENCOUNTER SYMPTOMS
CHILLS: 0
LOSS OF CONSCIOUSNESS: 0
DEPRESSION: 0
DIARRHEA: 0
PND: 0
PHOTOPHOBIA: 0
FOCAL WEAKNESS: 0
COUGH: 0
SORE THROAT: 0
HEARTBURN: 0
BLOOD IN STOOL: 0
BLURRED VISION: 0
MYALGIAS: 0
SHORTNESS OF BREATH: 0
DIAPHORESIS: 0
SEIZURES: 0
BRUISES/BLEEDS EASILY: 0
DIZZINESS: 0
NAUSEA: 0
VOMITING: 0
DOUBLE VISION: 0
HEMOPTYSIS: 0
ABDOMINAL PAIN: 0
WEIGHT LOSS: 0
WHEEZING: 0
ORTHOPNEA: 0
PALPITATIONS: 0
HEADACHES: 0
FEVER: 0

## 2021-10-23 ASSESSMENT — LIFESTYLE VARIABLES: SUBSTANCE_ABUSE: 0

## 2021-10-23 NOTE — CARE PLAN
The patient is Watcher - Medium risk of patient condition declining or worsening    Shift Goals  Clinical Goals: no bleed from biopsy site/PICC care/shower/mobility/RBC transfusion/covid testing/chemo education started  CHG bath  Patient Goals: rest/walk/blood/shower/sit up in chair    Progress made toward(s) clinical / shift goals:  yes      Problem: Knowledge Deficit - Standard  Goal: Patient and family/care givers will demonstrate understanding of plan of care, disease process/condition, diagnostic tests and medications  Outcome: Progressing  Note:   Monitor bone marrow site for prev bleeding - dressing cdi.  Picc care - CHG bath done after shower  Mobility/ambulate.    Provided chemo and you booklet  Provided chemo care - venetoclax       Problem: Fall Risk  Goal: Patient will remain free from falls  Outcome: Progressing     Problem: Mobility  Goal: Patient's capacity to carry out activities will improve  Outcome: Progressing     Problem: Self Care  Goal: Patient will have the ability to perform ADLs independently or with assistance (bathe, groom, dress, toilet and feed)  Outcome: Progressing

## 2021-10-23 NOTE — PROGRESS NOTES
Lab called with critical result of WBC of 48.9, Platelet of 45, and Hemoglobin of 6.5 at 0252. Critical lab result read back to Lab.   This critical lab result is within parameters established by Dr. Anguiano for this patient

## 2021-10-23 NOTE — PROGRESS NOTES
[facility] Medicine Daily Progress Note    Date of Service  10/23/2021    Chief Complaint  Alfonso Gaspar is a 76 y.o. male admitted 10/18/2021 with leukocytosis.     Hospital Course  This is a 76 year old male with PMHx of atrial fibrillation (s/p cardioversion, off AC, Dr. Low), hypertension, hyperlipidemia, and gout who was admitted on 10/18/2021 due to abnormal labs. Patient was seen by cardiology, Dr. Low, patient had a cardioversion in September 2021, patient returned to [facility], patient taken off of Eliquis due to recurrent nosebleeds.  Patient is currently on flecainide.  Patient had 4-5 ER visits due to this recurrent epistaxis, requiring packing, cauterization, and surgical procedure performed by ENT.  Patient is currently off of any anticoagulation.    Patient was sent in by PCP due to findings of leukocytosis, anemia, and thrombocytopenia on repeat labs. Labs on admission noted hemoglobin of 7.4, patient was transfused 1 unit, hemoglobin remained at 7.3.  We will continue with serial hemoglobins and transfuse if less than 7. Oncology was consulted, they do recommend inpatient bone marrow biopsy, this was performed 10/19, by my colleague, Dr. Arthur Caruso.  Oncology following.    Interval Problem Update  10/23 No acute events overnight.  Vital signs stable.  Hemoglobin below 7.  Transfused per protocol. Slept well. H2B helped. Trazodone as well. Hx of COVID. Will check screen prior to induction.    10/22 99% on 1.5 L via nasal cannula.  Blood pressure pulse and respiratory rate are benign.  Leukocytosis stable.  H/H 7.4/23, with macrocytosis.  Platelets 52 and stable.  Hyponatremia stable 131.  Complaining of nasal congestion postnasal drip and cough since his Covid infection which has since cleared.  No shortness of breath. Induction likely to start Mon.    10/21: Hg 6.6 Transfused overnight. WBC 48.5. Plt 63. Na 132. Fibrinogen OK. Hep panel benign. HIV NR. Discussed w Onc. Initiate/monitor induction when  cytology, FISH, etc.,finalized. No complaints beyond situational depression. Declined SI. Declined speaking w psych or clergy. Has supportive family. EKG interpreted by me w SR 1st deg block.     10/20: Patient was seen and examined at bedside.  No acute events overnight. Patient is resting comfortably in bed and in no acute distress.     Transfer to oncology  Bone marrow biopsy completed 10/19  Await bone marrow results  WBC 28.1 --> 43.5  Oncology recommendations appreciated    Consultants/Specialty  oncology    Code Status  Full Code    Disposition  Patient is not medically cleared.   Anticipate discharge to to home with close outpatient follow-up.  I have placed the appropriate orders for post-discharge needs.    Review of Systems  Review of Systems   Constitutional: Negative for chills, diaphoresis, fever and weight loss.   HENT: Negative for sore throat.    Respiratory: Negative for cough, shortness of breath and wheezing.    Cardiovascular: Negative for chest pain, palpitations and PND.   Gastrointestinal: Negative for abdominal pain, blood in stool, diarrhea, melena, nausea and vomiting.   Genitourinary: Negative for dysuria, hematuria and urgency.   Musculoskeletal: Negative for joint pain and myalgias.   Skin: Negative for rash.   Neurological: Negative for dizziness, focal weakness, seizures and loss of consciousness.   Endo/Heme/Allergies: Does not bruise/bleed easily.   Psychiatric/Behavioral: Negative for depression.        Physical Exam  Temp:  [36.1 °C (96.9 °F)-36.6 °C (97.9 °F)] 36.1 °C (96.9 °F)  Pulse:  [76-96] 82  Resp:  [18] 18  BP: ()/(40-65) 91/54  SpO2:  [95 %-98 %] 98 %    Physical Exam  Vitals and nursing note reviewed.   Constitutional:       Appearance: He is not ill-appearing.      Interventions: Nasal cannula in place.   HENT:      Head: Normocephalic and atraumatic.      Nose: Nose normal.   Eyes:      Conjunctiva/sclera: Conjunctivae normal.      Pupils: Pupils are equal,  round, and reactive to light.   Cardiovascular:      Rate and Rhythm: Normal rate and regular rhythm.      Heart sounds: No murmur heard.   No friction rub.   Pulmonary:      Effort: No respiratory distress.   Abdominal:      General: Bowel sounds are normal. There is no distension.      Palpations: Abdomen is soft.   Musculoskeletal:      Cervical back: Neck supple.   Skin:     General: Skin is warm and dry.   Neurological:      Mental Status: He is alert.   Psychiatric:         Attention and Perception: Attention normal.         Mood and Affect: Mood normal.         Fluids    Intake/Output Summary (Last 24 hours) at 10/23/2021 1423  Last data filed at 10/23/2021 0900  Gross per 24 hour   Intake 1310 ml   Output no documentation   Net 1310 ml       Laboratory  Recent Labs     10/21/21  2224 10/22/21  0524 10/23/21  0220   WBC 50.6* 44.9* 48.9*   RBC 2.22* 2.18* 1.93*   HEMOGLOBIN 7.7* 7.4* 6.5*   HEMATOCRIT 23.5* 23.1* 20.5*   .9* 106.0* 106.2*   MCH 34.7* 33.9* 33.7*   MCHC 32.8* 32.0* 31.7*   RDW 93.5* 92.5* 91.2*   PLATELETCT 58* 54* 45*     Recent Labs     10/21/21  0037 10/22/21  0524 10/23/21  0100   SODIUM 132* 131* 133*   POTASSIUM 4.6 4.1 4.6   CHLORIDE 102 101 102   CO2 24 22 21   GLUCOSE 101* 92 85   BUN 21 20 19   CREATININE 0.42* 0.60 0.55   CALCIUM 7.8* 7.8* 7.7*     Recent Labs     10/21/21  2224   INR 1.30*               Imaging  IR-PICC LINE PLACEMENT W/ GUIDANCE > AGE 5   Final Result                  Ultrasound-guided PICC placement performed by qualified nursing staff as    above.               Assessment/Plan  * AML (acute myeloblastic leukemia) (HCC)  Assessment & Plan  Based on Onc interpretation of BMB  Pending final pathology with cytogenetics, FISH panel for AML as well as MDS, and other PCR testing.  10/21 PICC    Post-nasal drip  Assessment & Plan  W hoarseness and dry cough; present since last COVID infection; likely post-infectous  Sudafed as needed and follow blood  pressure  Humidified O2  Ocean mist nasal spray as needed  Antihistamine PRN    Hyponatremia  Assessment & Plan  Na 133  Monitor    Elevated LDH  Assessment & Plan    Monitor following induction    Thrombocytopenia (HCC)  Assessment & Plan  Not currently bleeding, no epistaxis  Tranfuse platelets prn  Likely secondary to marrow dysfunction  Daily CBC    Leukocytosis- (present on admission)  Assessment & Plan  Likely secondary to Leukemia  S/p bone marrow biopsy 10/19/2021 by Dr. Arthur Caruso  Oncology consulted, Dr. ALEX Anguiano   Daily CBC w diff      Atrial fibrillation (HCC)- (present on admission)  Assessment & Plan  Patient follows up with Dr. Low  Currently had a cardioversion in September 2021  As per cardiology no longer on anticoagulation  Patient to continue with flecainide  10/21: EKG - SR    Anemia- (present on admission)  Assessment & Plan  S/p bone marrow biopsy 10/19/2021 by Dr. Arthur Caruso  Oncology consulted, Dr. ALEX Anguiano   Required 1 unit PRBC on 10/18/2021  Monitor with serial hemoglobins, transfuse if hemoglobin is less than 7  10/21 Transfused; daily CBC w diff  Standing transfusion order placed by Onc    Essential hypertension, benign- (present on admission)  Assessment & Plan  Continue Home Medications  Labetalol ivp prn with parameters       VTE prophylaxis: SCDs/TEDs and pharmacologic prophylaxis contraindicated due to low platelets    I have performed a physical exam and reviewed and updated ROS and Plan today (10/23/2021). In review of yesterday's note (10/22/2021), there are no changes except as documented above.

## 2021-10-23 NOTE — PROGRESS NOTES
Oncology/Hematology Progress Note               Author: Smith Anguiano M.D. Date & Time created: 10/23/2021  11:50 AM     CC: Pancytopenia/leukemia  Interval History:  10/23/2021: No new changes overnight.  He did get a transfusion.  He did get his PICC line placed yesterday.    Review of Systems:  Review of Systems   Constitutional: Positive for malaise/fatigue. Negative for chills, fever and weight loss.   HENT: Negative for ear discharge, ear pain, hearing loss and tinnitus.    Eyes: Negative for blurred vision, double vision and photophobia.   Respiratory: Negative for cough and hemoptysis.    Cardiovascular: Negative for chest pain, palpitations and orthopnea.   Gastrointestinal: Negative for abdominal pain, heartburn, nausea and vomiting.   Genitourinary: Negative for dysuria and urgency.   Skin: Negative for rash.   Neurological: Negative for dizziness and headaches.   Endo/Heme/Allergies: Negative for environmental allergies. Does not bruise/bleed easily.   Psychiatric/Behavioral: Negative for depression, substance abuse and suicidal ideas.       Physical Exam:  Physical Exam  Constitutional:       Appearance: Normal appearance.   Eyes:      Extraocular Movements: Extraocular movements intact.      Conjunctiva/sclera: Conjunctivae normal.   Cardiovascular:      Rate and Rhythm: Normal rate and regular rhythm.   Abdominal:      General: Bowel sounds are normal.   Neurological:      Mental Status: He is alert.         Labs:          Recent Labs     10/21/21  0037 10/22/21  0524 10/23/21  0100   SODIUM 132* 131* 133*   POTASSIUM 4.6 4.1 4.6   CHLORIDE 102 101 102   CO2 24 22 21   BUN 21 20 19   CREATININE 0.42* 0.60 0.55   MAGNESIUM 1.8  --   --    PHOSPHORUS 3.2  --   --    CALCIUM 7.8* 7.8* 7.7*     Recent Labs     10/21/21  0037 10/22/21  0524 10/23/21  0100   ALTSGPT 17  --   --    ASTSGOT 39  --   --    ALKPHOSPHAT 112*  --   --    TBILIRUBIN 0.6  --   --    GLUCOSE 101* 92 85     Recent Labs      10/21/21  2224 10/22/21  0524 10/23/21  0220   RBC 2.22* 2.18* 1.93*   HEMOGLOBIN 7.7* 7.4* 6.5*   HEMATOCRIT 23.5* 23.1* 20.5*   PLATELETCT 58* 54* 45*   PROTHROMBTM 15.8*  --   --    INR 1.30*  --   --      Recent Labs     10/21/21  0037 10/21/21  0037 10/21/21  2224 10/22/21  0524 10/23/21  0220   WBC 48.5*   < > 50.6* 44.9* 48.9*   NEUTSPOLYS 15.60*   < > 5.10* 15.70* 6.60*   LYMPHOCYTES 38.50   < > 46.10* 49.10* 56.20*   MONOCYTES 0.90   < > 0.90 13.00 2.50   EOSINOPHILS 0.00   < > 0.00 0.00 0.00   BASOPHILS 0.00   < > 0.00 0.00 0.00   ASTSGOT 39  --   --   --   --    ALTSGPT 17  --   --   --   --    ALKPHOSPHAT 112*  --   --   --   --    TBILIRUBIN 0.6  --   --   --   --     < > = values in this interval not displayed.     Recent Labs     10/21/21  0037 10/22/21  0524 10/23/21  0100   SODIUM 132* 131* 133*   POTASSIUM 4.6 4.1 4.6   CHLORIDE 102 101 102   CO2 24 22 21   GLUCOSE 101* 92 85   BUN 21 20 19   CREATININE 0.42* 0.60 0.55   CALCIUM 7.8* 7.8* 7.7*     Hemodynamics:  Temp (24hrs), Av.3 °C (97.4 °F), Min:36.1 °C (96.9 °F), Max:36.6 °C (97.9 °F)  Temperature: 36.1 °C (96.9 °F)  Pulse  Av.4  Min: 67  Max: 103   Blood Pressure : (!) 91/54     Respiratory:    Respiration: 18, Pulse Oximetry: 98 %     Work Of Breathing / Effort: Mild  RUL Breath Sounds: Expiratory Wheezes, RML Breath Sounds: Diminished, RLL Breath Sounds: Diminished, LLL Breath Sounds: Diminished  Fluids:    Intake/Output Summary (Last 24 hours) at 10/23/2021 1150  Last data filed at 10/23/2021 0900  Gross per 24 hour   Intake 1310 ml   Output --   Net 1310 ml        GI/Nutrition:  Orders Placed This Encounter   Procedures   • Diet Order Diet: Cardiac     Standing Status:   Standing     Number of Occurrences:   1     Order Specific Question:   Diet:     Answer:   Cardiac [6]     Medical Decision Making, by Problem:  Active Hospital Problems    Diagnosis    • *AML (acute myeloblastic leukemia) (HCC) [C92.00]    • Post-nasal drip  [R09.82]    • Elevated LDH [R74.02]    • Hyponatremia [E87.1]    • Leukocytosis [D72.829]    • Thrombocytopenia (HCC) [D69.6]    • Atrial fibrillation (HCC) [I48.91]    • Anemia [D64.9]    • Essential hypertension, benign [I10]      Procedures    PICC line right arm 10/22/2021    Impression  1.  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  Dr. Mims still waiting on some final test before he can fully release his final report.  Normal B12 and folic acid.  Normal fibrinogen  2.  Leukocytosis/anemia/thrombocytopenia all secondary to #1  3.  History of atrial fibrillation post cardioversion September 2021 on flecainide  4.  History of hypertension      Plan    I told him at this time his blood counts are stable we does not need Hydrea.  He did get a blood transfusion which we will continue his as needed transfusion parameters.  He has had no other new issues.  We did discuss that the pathologist believes most of his fish panel will come back on Monday and he will be issuing a report.  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  However, it could be more of a M0 subtype.  We discussed hypomethylating agents in combination with Venclexta.  He understands that this most likely will be the regimen unless there is other surprises from any of his additional testing that is pending.  His CBC has been otherwise stable along with his LDH.      High complexity/complex decision making/  Quality-Core Measures

## 2021-10-24 ENCOUNTER — APPOINTMENT (OUTPATIENT)
Dept: RADIOLOGY | Facility: MEDICAL CENTER | Age: 76
DRG: 835 | End: 2021-10-24
Attending: NURSE PRACTITIONER
Payer: COMMERCIAL

## 2021-10-24 LAB
ANION GAP SERPL CALC-SCNC: 8 MMOL/L (ref 7–16)
ANISOCYTOSIS BLD QL SMEAR: ABNORMAL
BASOPHILS # BLD AUTO: 0 % (ref 0–1.8)
BASOPHILS # BLD: 0 K/UL (ref 0–0.12)
BLASTS NFR BLD MANUAL: 33 %
BUN SERPL-MCNC: 19 MG/DL (ref 8–22)
CALCIUM SERPL-MCNC: 7.7 MG/DL (ref 8.5–10.5)
CHLORIDE SERPL-SCNC: 102 MMOL/L (ref 96–112)
CO2 SERPL-SCNC: 21 MMOL/L (ref 20–33)
CREAT SERPL-MCNC: 0.42 MG/DL (ref 0.5–1.4)
EOSINOPHIL # BLD AUTO: 0 K/UL (ref 0–0.51)
EOSINOPHIL NFR BLD: 0 % (ref 0–6.9)
ERYTHROCYTE [DISTWIDTH] IN BLOOD BY AUTOMATED COUNT: 89.4 FL (ref 35.9–50)
FIBRINOGEN PPP-MCNC: 223 MG/DL (ref 215–460)
FLUAV RNA SPEC QL NAA+PROBE: NEGATIVE
FLUBV RNA SPEC QL NAA+PROBE: NEGATIVE
GLUCOSE SERPL-MCNC: 96 MG/DL (ref 65–99)
HCT VFR BLD AUTO: 23.9 % (ref 42–52)
HGB BLD-MCNC: 7.7 G/DL (ref 14–18)
HYPOCHROMIA BLD QL SMEAR: ABNORMAL
LYMPHOCYTES # BLD AUTO: 22.08 K/UL (ref 1–4.8)
LYMPHOCYTES NFR BLD: 49.5 % (ref 22–41)
MACROCYTES BLD QL SMEAR: ABNORMAL
MANUAL DIFF BLD: NORMAL
MCH RBC QN AUTO: 32.8 PG (ref 27–33)
MCHC RBC AUTO-ENTMCNC: 32.2 G/DL (ref 33.7–35.3)
MCV RBC AUTO: 101.7 FL (ref 81.4–97.8)
MICROCYTES BLD QL SMEAR: ABNORMAL
MONOCYTES # BLD AUTO: 6.56 K/UL (ref 0–0.85)
MONOCYTES NFR BLD AUTO: 14.7 % (ref 0–13.4)
MORPHOLOGY BLD-IMP: NORMAL
NEUTROPHILS # BLD AUTO: 1.25 K/UL (ref 1.82–7.42)
NEUTROPHILS NFR BLD: 2.8 % (ref 44–72)
NRBC # BLD AUTO: 2.97 K/UL
NRBC BLD-RTO: 6.7 /100 WBC
PLATELET # BLD AUTO: 42 K/UL (ref 164–446)
PLATELET BLD QL SMEAR: NORMAL
POLYCHROMASIA BLD QL SMEAR: NORMAL
POTASSIUM SERPL-SCNC: 4.6 MMOL/L (ref 3.6–5.5)
RBC # BLD AUTO: 2.35 M/UL (ref 4.7–6.1)
RBC BLD AUTO: PRESENT
RSV RNA SPEC QL NAA+PROBE: NEGATIVE
SARS-COV-2 RNA RESP QL NAA+PROBE: NOTDETECTED
SMUDGE CELLS BLD QL SMEAR: NORMAL
SODIUM SERPL-SCNC: 131 MMOL/L (ref 135–145)
SPECIMEN SOURCE: NORMAL
WBC # BLD AUTO: 44.6 K/UL (ref 4.8–10.8)

## 2021-10-24 PROCEDURE — 85384 FIBRINOGEN ACTIVITY: CPT

## 2021-10-24 PROCEDURE — 85007 BL SMEAR W/DIFF WBC COUNT: CPT

## 2021-10-24 PROCEDURE — 700102 HCHG RX REV CODE 250 W/ 637 OVERRIDE(OP): Performed by: NURSE PRACTITIONER

## 2021-10-24 PROCEDURE — A9270 NON-COVERED ITEM OR SERVICE: HCPCS | Performed by: GENERAL PRACTICE

## 2021-10-24 PROCEDURE — 700102 HCHG RX REV CODE 250 W/ 637 OVERRIDE(OP): Performed by: STUDENT IN AN ORGANIZED HEALTH CARE EDUCATION/TRAINING PROGRAM

## 2021-10-24 PROCEDURE — 99232 SBSQ HOSP IP/OBS MODERATE 35: CPT | Performed by: NURSE PRACTITIONER

## 2021-10-24 PROCEDURE — 71045 X-RAY EXAM CHEST 1 VIEW: CPT

## 2021-10-24 PROCEDURE — 0241U HCHG SARS-COV-2 COVID-19 NFCT DS RESP RNA 4 TRGT MIC: CPT

## 2021-10-24 PROCEDURE — A9270 NON-COVERED ITEM OR SERVICE: HCPCS | Performed by: STUDENT IN AN ORGANIZED HEALTH CARE EDUCATION/TRAINING PROGRAM

## 2021-10-24 PROCEDURE — 80048 BASIC METABOLIC PNL TOTAL CA: CPT

## 2021-10-24 PROCEDURE — 770004 HCHG ROOM/CARE - ONCOLOGY PRIVATE *

## 2021-10-24 PROCEDURE — C9803 HOPD COVID-19 SPEC COLLECT: HCPCS | Performed by: NURSE PRACTITIONER

## 2021-10-24 PROCEDURE — 700102 HCHG RX REV CODE 250 W/ 637 OVERRIDE(OP): Performed by: GENERAL PRACTICE

## 2021-10-24 PROCEDURE — A9270 NON-COVERED ITEM OR SERVICE: HCPCS | Performed by: INTERNAL MEDICINE

## 2021-10-24 PROCEDURE — 700102 HCHG RX REV CODE 250 W/ 637 OVERRIDE(OP): Performed by: INTERNAL MEDICINE

## 2021-10-24 PROCEDURE — A9270 NON-COVERED ITEM OR SERVICE: HCPCS | Performed by: NURSE PRACTITIONER

## 2021-10-24 PROCEDURE — 85027 COMPLETE CBC AUTOMATED: CPT

## 2021-10-24 RX ORDER — VITAMIN B COMPLEX
2000 TABLET ORAL DAILY
Status: DISCONTINUED | OUTPATIENT
Start: 2021-10-24 | End: 2021-11-05 | Stop reason: HOSPADM

## 2021-10-24 RX ORDER — M-VIT,TX,IRON,MINS/CALC/FOLIC 27MG-0.4MG
1 TABLET ORAL DAILY
Status: DISCONTINUED | OUTPATIENT
Start: 2021-10-24 | End: 2021-11-05 | Stop reason: HOSPADM

## 2021-10-24 RX ORDER — FERROUS SULFATE 325(65) MG
325 TABLET ORAL DAILY
Status: DISCONTINUED | OUTPATIENT
Start: 2021-10-24 | End: 2021-11-05 | Stop reason: HOSPADM

## 2021-10-24 RX ADMIN — LORATADINE 10 MG: 10 TABLET ORAL at 00:33

## 2021-10-24 RX ADMIN — FERROUS SULFATE TAB 325 MG (65 MG ELEMENTAL FE) 325 MG: 325 (65 FE) TAB at 16:46

## 2021-10-24 RX ADMIN — LORATADINE 10 MG: 10 TABLET ORAL at 20:37

## 2021-10-24 RX ADMIN — Medication 2 SPRAY: at 16:47

## 2021-10-24 RX ADMIN — Medication 1 TABLET: at 16:46

## 2021-10-24 RX ADMIN — FLECAINIDE ACETATE 50 MG: 50 TABLET ORAL at 16:46

## 2021-10-24 RX ADMIN — TRAZODONE HYDROCHLORIDE 50 MG: 100 TABLET ORAL at 20:37

## 2021-10-24 RX ADMIN — ALLOPURINOL 300 MG: 300 TABLET ORAL at 06:48

## 2021-10-24 RX ADMIN — Medication 2000 UNITS: at 16:46

## 2021-10-24 RX ADMIN — FLECAINIDE ACETATE 50 MG: 50 TABLET ORAL at 06:48

## 2021-10-24 ASSESSMENT — ENCOUNTER SYMPTOMS
WHEEZING: 0
COUGH: 1
SPUTUM PRODUCTION: 0
DOUBLE VISION: 0
LOSS OF CONSCIOUSNESS: 0
VOMITING: 0
BRUISES/BLEEDS EASILY: 0
DIARRHEA: 0
WEIGHT LOSS: 0
FOCAL WEAKNESS: 0
HEARTBURN: 0
PND: 0
HEMOPTYSIS: 0
SEIZURES: 0
MYALGIAS: 0
BLURRED VISION: 0
DIAPHORESIS: 0
SHORTNESS OF BREATH: 0
DEPRESSION: 0
PALPITATIONS: 0
CHILLS: 0
HEADACHES: 0
SORE THROAT: 0
BLOOD IN STOOL: 0
ORTHOPNEA: 0
NAUSEA: 0
PHOTOPHOBIA: 0
FEVER: 0
ABDOMINAL PAIN: 0
DIZZINESS: 0

## 2021-10-24 ASSESSMENT — LIFESTYLE VARIABLES: SUBSTANCE_ABUSE: 0

## 2021-10-24 NOTE — PROGRESS NOTES
Maegan from Lab called with critical result of WBC of 44.6 and Platelet of 42 at 0115. Critical lab result read back to Maegan.   This critical lab result is within parameters established by Dr. Anguiano for this patient

## 2021-10-24 NOTE — CARE PLAN
The patient is Watcher - Medium risk of patient condition declining or worsening    Shift Goals  Clinical Goals: no bleed from biopsy site/PICC care/shower/mobility/RBC transfusion/covid testing/chemo education started  CHG bath  Patient Goals: rest/walk/blood/shower/sit up in chair    Progress made toward(s) clinical / shift goals:  yes      Problem: Knowledge Deficit - Standard  Goal: Patient and family/care givers will demonstrate understanding of plan of care, disease process/condition, diagnostic tests and medications  Outcome: Progressing  Note:   IS provided and education given - encouraged use.  Picc care - CHG bath   Mobility/ambulate.  Redo covid test - covid r/o iso started - iso edu given.  Pt requesting vitamin list and arthritic rub for hands and lozenges approved.         Problem: Fall Risk  Goal: Patient will remain free from falls  Outcome: Progressing     Problem: Mobility  Goal: Patient's capacity to carry out activities will improve  Outcome: Progressing     Problem: Self Care  Goal: Patient will have the ability to perform ADLs independently or with assistance (bathe, groom, dress, toilet and feed)  Outcome: Progressing

## 2021-10-24 NOTE — PROGRESS NOTES
[facility] Medicine Daily Progress Note    Date of Service  10/24/2021    Chief Complaint  Alfonso Gaspar is a 76 y.o. male admitted 10/18/2021 with leukocytosis.     Hospital Course  This is a 76 year old male with PMHx of atrial fibrillation (s/p cardioversion, off AC, Dr. Low), hypertension, hyperlipidemia, and gout who was admitted on 10/18/2021 due to abnormal labs. Patient was seen by cardiology, Dr. Low, patient had a cardioversion in September 2021, patient returned to [facility], patient taken off of Eliquis due to recurrent nosebleeds.  Patient is currently on flecainide.  Patient had 4-5 ER visits due to this recurrent epistaxis, requiring packing, cauterization, and surgical procedure performed by ENT.  Patient is currently off of any anticoagulation.    Patient was sent in by PCP due to findings of leukocytosis, anemia, and thrombocytopenia on repeat labs. Labs on admission noted hemoglobin of 7.4, patient was transfused 1 unit, hemoglobin remained at 7.3.  We will continue with serial hemoglobins and transfuse if less than 7. Oncology was consulted, they do recommend inpatient bone marrow biopsy, this was performed 10/19, by my colleague, Dr. Arthur Caruso.  Oncology following.    Interval Problem Update  10/24 Labs, VS stable. COVID-19 canceled.     10/23 No acute events overnight.  Vital signs stable.  Hemoglobin below 7.  Transfused per protocol. Slept well. H2B helped. Trazodone as well. Hx of COVID. Will check screen prior to induction.    10/22 99% on 1.5 L via nasal cannula.  Blood pressure pulse and respiratory rate are benign.  Leukocytosis stable.  H/H 7.4/23, with macrocytosis.  Platelets 52 and stable.  Hyponatremia stable 131.  Complaining of nasal congestion postnasal drip and cough since his Covid infection which has since cleared.  No shortness of breath. Induction likely to start Mon.    10/21: Hg 6.6 Transfused overnight. WBC 48.5. Plt 63. Na 132. Fibrinogen OK. Hep panel benign. HIV NR.  Discussed w Onc. Initiate/monitor induction when cytology, FISH, etc.,finalized. No complaints beyond situational depression. Declined SI. Declined speaking w psych or clergy. Has supportive family. EKG interpreted by me w  1st deg block.     10/20: Patient was seen and examined at bedside.  No acute events overnight. Patient is resting comfortably in bed and in no acute distress.     Transfer to oncology  Bone marrow biopsy completed 10/19  Await bone marrow results  WBC 28.1 --> 43.5  Oncology recommendations appreciated    Consultants/Specialty  oncology    Code Status  Full Code    Disposition  Patient is not medically cleared.   Anticipate discharge to to home with close outpatient follow-up.  I have placed the appropriate orders for post-discharge needs.    Review of Systems  Review of Systems   Constitutional: Negative for chills, diaphoresis, fever and weight loss.   HENT: Negative for sore throat.         Post nasal drip; hoarseness   Respiratory: Positive for cough. Negative for shortness of breath and wheezing.    Cardiovascular: Negative for chest pain, palpitations and PND.   Gastrointestinal: Negative for abdominal pain, blood in stool, diarrhea, melena, nausea and vomiting.   Genitourinary: Negative for dysuria, hematuria and urgency.   Musculoskeletal: Negative for joint pain and myalgias.   Skin: Negative for rash.   Neurological: Negative for dizziness, focal weakness, seizures and loss of consciousness.   Endo/Heme/Allergies: Does not bruise/bleed easily.   Psychiatric/Behavioral: Negative for depression.   All other systems reviewed and are negative.       Physical Exam  Temp:  [36.4 °C (97.6 °F)-36.8 °C (98.2 °F)] 36.4 °C (97.6 °F)  Pulse:  [83-93] 83  Resp:  [18] 18  BP: ()/(52-67) 96/56  SpO2:  [90 %-98 %] 90 %    Physical Exam  Vitals and nursing note reviewed.   Constitutional:       Appearance: He is not ill-appearing.      Interventions: Nasal cannula in place.   HENT:      Head:  Normocephalic and atraumatic.      Nose: Nose normal.   Eyes:      Conjunctiva/sclera: Conjunctivae normal.      Pupils: Pupils are equal, round, and reactive to light.   Cardiovascular:      Rate and Rhythm: Normal rate and regular rhythm.      Heart sounds: No murmur heard.   No friction rub.   Pulmonary:      Effort: No respiratory distress.   Abdominal:      General: Bowel sounds are normal. There is no distension.      Palpations: Abdomen is soft.   Musculoskeletal:      Cervical back: Neck supple.   Skin:     General: Skin is warm and dry.   Neurological:      Mental Status: He is alert.   Psychiatric:         Attention and Perception: Attention normal.         Mood and Affect: Mood normal.         Fluids    Intake/Output Summary (Last 24 hours) at 10/24/2021 1052  Last data filed at 10/24/2021 0900  Gross per 24 hour   Intake 5280 ml   Output no documentation   Net 5280 ml       Laboratory  Recent Labs     10/22/21  0524 10/23/21  0220 10/24/21  0038   WBC 44.9* 48.9* 44.6*   RBC 2.18* 1.93* 2.35*   HEMOGLOBIN 7.4* 6.5* 7.7*   HEMATOCRIT 23.1* 20.5* 23.9*   .0* 106.2* 101.7*   MCH 33.9* 33.7* 32.8   MCHC 32.0* 31.7* 32.2*   RDW 92.5* 91.2* 89.4*   PLATELETCT 54* 45* 42*     Recent Labs     10/22/21  0524 10/23/21  0100 10/24/21  0038   SODIUM 131* 133* 131*   POTASSIUM 4.1 4.6 4.6   CHLORIDE 101 102 102   CO2 22 21 21   GLUCOSE 92 85 96   BUN 20 19 19   CREATININE 0.60 0.55 0.42*   CALCIUM 7.8* 7.7* 7.7*     Recent Labs     10/21/21  2224   INR 1.30*               Imaging  DX-CHEST-PORTABLE (1 VIEW)   Final Result      Right PICC line placement with the tip projecting over the proximal right atrium.      IR-PICC LINE PLACEMENT W/ GUIDANCE > AGE 5   Final Result                  Ultrasound-guided PICC placement performed by qualified nursing staff as    above.               Assessment/Plan  * AML (acute myeloblastic leukemia) (HCC)  Assessment & Plan  Based on initial interpretation of BMB  Pending  final pathology with cytogenetics, FISH panel for AML as well as MDS, and other PCR testing.  10/21 PICC    Post-nasal drip  Assessment & Plan  W hoarseness and dry cough -- present since last COVID infection; likely post-infectous  Sudafed as needed and follow blood pressure  Humidified O2  Ocean mist nasal spray as needed  Antihistamine PRN  10/24: Repeat COVID PCR negative. CXR clear.    Hyponatremia  Assessment & Plan  Stable; monitor    Elevated LDH  Assessment & Plan  LDH baseline 347  Monitor following induction    Thrombocytopenia (HCC)  Assessment & Plan  Not currently bleeding, no epistaxis  Tranfuse platelets prn  Likely secondary to marrow dysfunction  Daily CBC    Leukocytosis- (present on admission)  Assessment & Plan  Likely secondary to Leukemia  S/p bone marrow biopsy 10/19/2021 by Dr. Arthur Caruso  Oncology consulted, Dr. ALEX Anguiano   Daily CBC w diff      Atrial fibrillation (HCC)- (present on admission)  Assessment & Plan  Patient follows up with Dr. Low  Currently had a cardioversion in September 2021  As per cardiology no longer on anticoagulation  Patient to continue with flecainide  10/21: EKG - SR    Anemia- (present on admission)  Assessment & Plan  S/p bone marrow biopsy 10/19/2021 by Dr. Arthur Caruso  Oncology consulted, Dr. ALEX Anguiano   Required 1 unit PRBC on 10/18/2021  Monitor with serial hemoglobins, transfuse if hemoglobin is less than 7  10/21 Transfused; daily CBC w diff  Standing transfusion order placed by Onc    Essential hypertension, benign- (present on admission)  Assessment & Plan  Continue Home Medications  Labetalol ivp prn with parameters       VTE prophylaxis: SCDs/TEDs and pharmacologic prophylaxis contraindicated due to low platelets    I have performed a physical exam and reviewed and updated ROS and Plan today (10/24/2021). In review of yesterday's note (10/23/2021), there are no changes except as documented above.

## 2021-10-24 NOTE — CARE PLAN
The patient is Watcher - Medium risk of patient condition declining or worsening    Shift Goals  Clinical Goals: Pt will maintain counts, start chemotherapy soon  Patient Goals: Rest, sleep well    Progress made toward(s) clinical / shift goals:        Problem: Knowledge Deficit - Standard  Goal: Patient and family/care givers will demonstrate understanding of plan of care, disease process/condition, diagnostic tests and medications  Outcome: Progressing     Problem: Fall Risk  Goal: Patient will remain free from falls  Outcome: Progressing     Problem: Mobility  Goal: Patient's capacity to carry out activities will improve  Outcome: Progressing     Problem: Self Care  Goal: Patient will have the ability to perform ADLs independently or with assistance (bathe, groom, dress, toilet and feed)  Outcome: Progressing       Patient is not progressing towards the following goals:

## 2021-10-24 NOTE — PROGRESS NOTES
Oncology/Hematology Progress Note               Author: Smith Anguiano M.D. Date & Time created: 10/24/2021  10:04 AM     CC: Pancytopenia/leukemia  Interval History:  10/23/2021: No new changes overnight.  He did get a transfusion.  He did get his PICC line placed yesterday.  10/24/2021: The patient still has some mild sinus change and I guess his had some mild chronic cough since his Covid infection back in July.  He does have a pending follow-up test right now since all his new developments.  He is not short of breath.    Review of Systems:  Review of Systems   Constitutional: Positive for malaise/fatigue. Negative for chills, fever and weight loss.   HENT: Positive for congestion. Negative for ear discharge, ear pain, hearing loss and tinnitus.    Eyes: Negative for blurred vision, double vision and photophobia.   Respiratory: Positive for cough. Negative for hemoptysis, sputum production and shortness of breath.    Cardiovascular: Negative for chest pain, palpitations and orthopnea.   Gastrointestinal: Negative for abdominal pain, heartburn, nausea and vomiting.   Genitourinary: Negative for dysuria and urgency.   Skin: Negative for rash.   Neurological: Negative for dizziness and headaches.   Endo/Heme/Allergies: Negative for environmental allergies. Does not bruise/bleed easily.   Psychiatric/Behavioral: Negative for depression, substance abuse and suicidal ideas.       Physical Exam:  Physical Exam  Constitutional:       Appearance: Normal appearance.   Eyes:      Extraocular Movements: Extraocular movements intact.      Conjunctiva/sclera: Conjunctivae normal.   Neurological:      Mental Status: He is alert and oriented to person, place, and time.   Psychiatric:         Mood and Affect: Mood normal.         Behavior: Behavior normal.         Thought Content: Thought content normal.         Judgment: Judgment normal.         Labs:          Recent Labs     10/22/21  0524 10/23/21  0100 10/24/21  0038    SODIUM 131* 133* 131*   POTASSIUM 4.1 4.6 4.6   CHLORIDE 101 102 102   CO2  21   BUN 20  19   CREATININE 0.60 0.55 0.42*   CALCIUM 7.8* 7.7* 7.7*     Recent Labs     10/22/21  0524 10/23/21  0100 10/24/21  0038   GLUCOSE 92 85 96     Recent Labs     10/21/21  2224 10/21/21  2224 10/22/21  0524 10/23/21  0220 10/24/21  0038   RBC 2.22*   < > 2.18* 1.93* 2.35*   HEMOGLOBIN 7.7*   < > 7.4* 6.5* 7.7*   HEMATOCRIT 23.5*   < > 23.1* 20.5* 23.9*   PLATELETCT 58*   < > 54* 45* 42*   PROTHROMBTM 15.8*  --   --   --   --    INR 1.30*  --   --   --   --     < > = values in this interval not displayed.     Recent Labs     10/22/21  0524 10/23/21  0220 10/24/21  0038   WBC 44.9* 48.9* 44.6*   NEUTSPOLYS 15.70* 6.60* 2.80*   LYMPHOCYTES 49.10* 56.20* 49.50*   MONOCYTES 13.00 2.50 14.70*   EOSINOPHILS 0.00 0.00 0.00   BASOPHILS 0.00 0.00 0.00     Recent Labs     10/22/21  0524 10/23/21  0100 10/24/21  0038   SODIUM 131* 133* 131*   POTASSIUM 4.1 4.6 4.6   CHLORIDE 101 102 102   CO2    GLUCOSE 92 85 96   BUN 20  19   CREATININE 0.60 0.55 0.42*   CALCIUM 7.8* 7.7* 7.7*     Hemodynamics:  Temp (24hrs), Av.6 °C (97.9 °F), Min:36.4 °C (97.6 °F), Max:36.8 °C (98.2 °F)  Temperature: 36.4 °C (97.6 °F)  Pulse  Av.4  Min: 67  Max: 103   Blood Pressure : (!) 96/56     Respiratory:    Respiration: 18, Pulse Oximetry: 90 %     Work Of Breathing / Effort: Mild  RUL Breath Sounds: Expiratory Wheezes, RML Breath Sounds: Diminished, RLL Breath Sounds: Diminished, JOE Breath Sounds: Expiratory Wheezes, LLL Breath Sounds: Diminished  Fluids:    Intake/Output Summary (Last 24 hours) at 10/23/2021 1150  Last data filed at 10/23/2021 0900  Gross per 24 hour   Intake 1310 ml   Output --   Net 1310 ml        GI/Nutrition:  Orders Placed This Encounter   Procedures   • Diet Order Diet: Cardiac     Standing Status:   Standing     Number of Occurrences:   1     Order Specific Question:   Diet:     Answer:   Cardiac [6]      Medical Decision Making, by Problem:  Active Hospital Problems    Diagnosis    • *AML (acute myeloblastic leukemia) (HCC) [C92.00]    • Post-nasal drip [R09.82]    • Elevated LDH [R74.02]    • Hyponatremia [E87.1]    • Leukocytosis [D72.829]    • Thrombocytopenia (HCC) [D69.6]    • Atrial fibrillation (HCC) [I48.91]    • Anemia [D64.9]    • Essential hypertension, benign [I10]      Procedures    PICC line right arm 10/22/2021    Impression  1.  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  Dr. Mims still waiting on some final test before he can fully release his final report.  Normal B12 and folic acid.  Normal fibrinogen  2.  Leukocytosis/anemia/thrombocytopenia all secondary to #1  3.  History of atrial fibrillation post cardioversion September 2021 on flecainide  4.  History of hypertension  5.  Covid infection in July 2021 with chronic sinus symptoms    Plan    --Continue daily laboratory CBC  --We will add prophylactic antibiotics around the time of treatment  --We will follow up on pathology Monday  --Covid rapid test pending with his history and ongoing chronic sinus/cough symptoms.  Patient feels well otherwise.  He is not short of breath.  He said this has been going on since getting over his infection.  --Primary team also getting baseline chest x-ray    High complexity/complex decision making/  Quality-Core Measures

## 2021-10-25 LAB
ANION GAP SERPL CALC-SCNC: 9 MMOL/L (ref 7–16)
ANISOCYTOSIS BLD QL SMEAR: ABNORMAL
BASOPHILS # BLD AUTO: 0 % (ref 0–1.8)
BASOPHILS # BLD: 0 K/UL (ref 0–0.12)
BLASTS NFR BLD MANUAL: 33.3 %
BUN SERPL-MCNC: 16 MG/DL (ref 8–22)
CALCIUM SERPL-MCNC: 7.8 MG/DL (ref 8.5–10.5)
CHLORIDE SERPL-SCNC: 102 MMOL/L (ref 96–112)
CO2 SERPL-SCNC: 21 MMOL/L (ref 20–33)
CREAT SERPL-MCNC: 0.46 MG/DL (ref 0.5–1.4)
EOSINOPHIL # BLD AUTO: 0 K/UL (ref 0–0.51)
EOSINOPHIL NFR BLD: 0 % (ref 0–6.9)
ERYTHROCYTE [DISTWIDTH] IN BLOOD BY AUTOMATED COUNT: 83.3 FL (ref 35.9–50)
GLUCOSE SERPL-MCNC: 84 MG/DL (ref 65–99)
HCT VFR BLD AUTO: 22.9 % (ref 42–52)
HGB BLD-MCNC: 7.6 G/DL (ref 14–18)
LYMPHOCYTES # BLD AUTO: 24.45 K/UL (ref 1–4.8)
LYMPHOCYTES NFR BLD: 49.5 % (ref 22–41)
MACROCYTES BLD QL SMEAR: ABNORMAL
MANUAL DIFF BLD: NORMAL
MCH RBC QN AUTO: 33.3 PG (ref 27–33)
MCHC RBC AUTO-ENTMCNC: 33.2 G/DL (ref 33.7–35.3)
MCV RBC AUTO: 100.4 FL (ref 81.4–97.8)
MONOCYTES # BLD AUTO: 4 K/UL (ref 0–0.85)
MONOCYTES NFR BLD AUTO: 8.1 % (ref 0–13.4)
MORPHOLOGY BLD-IMP: NORMAL
NEUTROPHILS # BLD AUTO: 4.5 K/UL (ref 1.82–7.42)
NEUTROPHILS NFR BLD: 9.1 % (ref 44–72)
NRBC # BLD AUTO: 2.74 K/UL
NRBC BLD-RTO: 5.5 /100 WBC
PLATELET # BLD AUTO: 33 K/UL (ref 164–446)
PLATELET BLD QL SMEAR: NORMAL
PLATELETS.RETICULATED NFR BLD AUTO: 27.2 K/UL (ref 0.6–13.1)
POLYCHROMASIA BLD QL SMEAR: NORMAL
POTASSIUM SERPL-SCNC: 4.1 MMOL/L (ref 3.6–5.5)
RBC # BLD AUTO: 2.28 M/UL (ref 4.7–6.1)
RBC BLD AUTO: PRESENT
SMUDGE CELLS BLD QL SMEAR: NORMAL
SODIUM SERPL-SCNC: 132 MMOL/L (ref 135–145)
WBC # BLD AUTO: 49.4 K/UL (ref 4.8–10.8)

## 2021-10-25 PROCEDURE — 700102 HCHG RX REV CODE 250 W/ 637 OVERRIDE(OP): Performed by: NURSE PRACTITIONER

## 2021-10-25 PROCEDURE — 700102 HCHG RX REV CODE 250 W/ 637 OVERRIDE(OP): Performed by: INTERNAL MEDICINE

## 2021-10-25 PROCEDURE — 700102 HCHG RX REV CODE 250 W/ 637 OVERRIDE(OP): Performed by: STUDENT IN AN ORGANIZED HEALTH CARE EDUCATION/TRAINING PROGRAM

## 2021-10-25 PROCEDURE — 700102 HCHG RX REV CODE 250 W/ 637 OVERRIDE(OP): Performed by: GENERAL PRACTICE

## 2021-10-25 PROCEDURE — A9270 NON-COVERED ITEM OR SERVICE: HCPCS | Performed by: GENERAL PRACTICE

## 2021-10-25 PROCEDURE — 85027 COMPLETE CBC AUTOMATED: CPT

## 2021-10-25 PROCEDURE — A9270 NON-COVERED ITEM OR SERVICE: HCPCS | Performed by: STUDENT IN AN ORGANIZED HEALTH CARE EDUCATION/TRAINING PROGRAM

## 2021-10-25 PROCEDURE — 85055 RETICULATED PLATELET ASSAY: CPT

## 2021-10-25 PROCEDURE — 80048 BASIC METABOLIC PNL TOTAL CA: CPT

## 2021-10-25 PROCEDURE — 85007 BL SMEAR W/DIFF WBC COUNT: CPT

## 2021-10-25 PROCEDURE — A9270 NON-COVERED ITEM OR SERVICE: HCPCS | Performed by: INTERNAL MEDICINE

## 2021-10-25 PROCEDURE — 770004 HCHG ROOM/CARE - ONCOLOGY PRIVATE *

## 2021-10-25 PROCEDURE — 99232 SBSQ HOSP IP/OBS MODERATE 35: CPT | Performed by: NURSE PRACTITIONER

## 2021-10-25 PROCEDURE — A9270 NON-COVERED ITEM OR SERVICE: HCPCS | Performed by: NURSE PRACTITIONER

## 2021-10-25 RX ORDER — GUAIFENESIN 600 MG/1
600 TABLET, EXTENDED RELEASE ORAL EVERY 12 HOURS
Status: DISCONTINUED | OUTPATIENT
Start: 2021-10-25 | End: 2021-11-05 | Stop reason: HOSPADM

## 2021-10-25 RX ADMIN — GUAIFENESIN 600 MG: 600 TABLET, EXTENDED RELEASE ORAL at 17:22

## 2021-10-25 RX ADMIN — FERROUS SULFATE TAB 325 MG (65 MG ELEMENTAL FE) 325 MG: 325 (65 FE) TAB at 06:03

## 2021-10-25 RX ADMIN — TRAZODONE HYDROCHLORIDE 50 MG: 100 TABLET ORAL at 20:10

## 2021-10-25 RX ADMIN — Medication 2000 UNITS: at 06:03

## 2021-10-25 RX ADMIN — LORATADINE 10 MG: 10 TABLET ORAL at 20:10

## 2021-10-25 RX ADMIN — FLECAINIDE ACETATE 50 MG: 50 TABLET ORAL at 17:22

## 2021-10-25 RX ADMIN — Medication 1 TABLET: at 06:03

## 2021-10-25 RX ADMIN — DOCUSATE SODIUM 50 MG AND SENNOSIDES 8.6 MG 2 TABLET: 8.6; 5 TABLET, FILM COATED ORAL at 17:22

## 2021-10-25 RX ADMIN — ALLOPURINOL 300 MG: 300 TABLET ORAL at 06:03

## 2021-10-25 RX ADMIN — DOCUSATE SODIUM 50 MG AND SENNOSIDES 8.6 MG 2 TABLET: 8.6; 5 TABLET, FILM COATED ORAL at 06:03

## 2021-10-25 RX ADMIN — FLECAINIDE ACETATE 50 MG: 50 TABLET ORAL at 06:04

## 2021-10-25 ASSESSMENT — ENCOUNTER SYMPTOMS
BRUISES/BLEEDS EASILY: 0
NAUSEA: 0
WEIGHT LOSS: 0
FOCAL WEAKNESS: 0
VOMITING: 0
HEMOPTYSIS: 0
DOUBLE VISION: 0
DIARRHEA: 0
SORE THROAT: 0
HEADACHES: 0
LOSS OF CONSCIOUSNESS: 0
COUGH: 1
SPUTUM PRODUCTION: 0
PND: 0
SHORTNESS OF BREATH: 0
DIAPHORESIS: 0
PHOTOPHOBIA: 0
WHEEZING: 0
FEVER: 0
ABDOMINAL PAIN: 0
PALPITATIONS: 0
HEARTBURN: 0
DEPRESSION: 0
SEIZURES: 0
ORTHOPNEA: 0
MYALGIAS: 0
BLURRED VISION: 0
CHILLS: 0
BLOOD IN STOOL: 0
COUGH: 0
DIZZINESS: 0

## 2021-10-25 ASSESSMENT — PAIN DESCRIPTION - PAIN TYPE: TYPE: ACUTE PAIN

## 2021-10-25 ASSESSMENT — LIFESTYLE VARIABLES: SUBSTANCE_ABUSE: 0

## 2021-10-25 ASSESSMENT — FIBROSIS 4 INDEX: FIB4 SCORE: 22.07

## 2021-10-25 NOTE — PROGRESS NOTES
[facility] Medicine Daily Progress Note    Date of Service    10/25/21    Chief Complaint  Alfonso Gaspar is a 76 y.o. male admitted 10/18/2021 with leukocytosis.     Hospital Course  This is a 76 year old male with PMHx of atrial fibrillation (s/p cardioversion, off AC, Dr. Low), hypertension, hyperlipidemia, and gout who was admitted on 10/18/2021 due to abnormal labs. Patient was seen by cardiology, Dr. Low, patient had a cardioversion in September 2021, patient returned to [facility], patient taken off of Eliquis due to recurrent nosebleeds.  Patient is currently on flecainide.  Patient had 4-5 ER visits due to this recurrent epistaxis, requiring packing, cauterization, and surgical procedure performed by ENT.  Patient is currently off of any anticoagulation.    Patient was sent in by PCP due to findings of leukocytosis, anemia, and thrombocytopenia on repeat labs. Labs on admission noted hemoglobin of 7.4, patient was transfused 1 unit, hemoglobin remained at 7.3.  We will continue with serial hemoglobins and transfuse if less than 7. Oncology was consulted, they do recommend inpatient bone marrow biopsy, this was performed 10/19, by my colleague, Dr. Arthur Caruso.  Oncology following.    Interval Problem Update  10/25 Covid PCR negative.  Chest x-ray clear.  Restarted some of his home vitamins.  Quite sleepy today as he had episodes of insomnia last night, which could be related to the Sudafed.  We will hold this moving forward.  Would like to try some Mucinex.  Discussed with Dr. Marquis; likely to start venetoclax and Vidaza tomorrow for AML induction.   [employer] reviewed.  FLT3 not detected. No identified translocations or deletions.  CEBPA not detected.  IDH 1/2 not detected.  NPM 1 not detected.    10/24 Labs, VS stable. COVID-19 canceled. Reordered. Neg.  Chest x-ray    10/23 No acute events overnight.  Vital signs stable.  Hemoglobin below 7.  Transfused per protocol. Slept well. H2B helped. Trazodone as  well. Hx of COVID. Will check screen prior to induction.    10/22 99% on 1.5 L via nasal cannula.  Blood pressure pulse and respiratory rate are benign.  Leukocytosis stable.  H/H 7.4/23, with macrocytosis.  Platelets 52 and stable.  Hyponatremia stable 131.  Complaining of nasal congestion postnasal drip and cough since his Covid infection which has since cleared.  No shortness of breath. Induction likely to start Mon.    10/21: Hg 6.6 Transfused overnight. WBC 48.5. Plt 63. Na 132. Fibrinogen OK. Hep panel benign. HIV NR. Discussed w Onc. Initiate/monitor induction when cytology, FISH, etc.,finalized. No complaints beyond situational depression. Declined SI. Declined speaking w psych or clergy. Has supportive family. EKG interpreted by me w SR 1st deg block.     10/20: Patient was seen and examined at bedside.  No acute events overnight. Patient is resting comfortably in bed and in no acute distress.     Transfer to oncology  Bone marrow biopsy completed 10/19  Await bone marrow results  WBC 28.1 --> 43.5  Oncology recommendations appreciated    Consultants/Specialty  oncology    Code Status  Full Code    Disposition  Patient is not medically cleared.   Anticipate discharge to to home with close outpatient follow-up.  I have placed the appropriate orders for post-discharge needs.    Review of Systems  Review of Systems   Constitutional: Positive for malaise/fatigue. Negative for chills, diaphoresis, fever and weight loss.   HENT: Negative for sore throat.         Post nasal drip; hoarseness   Respiratory: Positive for cough. Negative for shortness of breath and wheezing.    Cardiovascular: Negative for chest pain, palpitations and PND.   Gastrointestinal: Negative for abdominal pain, blood in stool, diarrhea, melena, nausea and vomiting.   Genitourinary: Negative for dysuria, hematuria and urgency.   Musculoskeletal: Negative for joint pain and myalgias.   Skin: Negative for rash.   Neurological: Negative for  dizziness, focal weakness, seizures and loss of consciousness.   Endo/Heme/Allergies: Does not bruise/bleed easily.   Psychiatric/Behavioral: Negative for depression.   All other systems reviewed and are negative.       Physical Exam  Temp:  [36.6 °C (97.8 °F)-37.2 °C (99 °F)] 36.6 °C (97.8 °F)  Pulse:  [84-92] 88  Resp:  [16-18] 16  BP: ()/(46-66) 103/55  SpO2:  [90 %-95 %] 92 %    Physical Exam  Vitals and nursing note reviewed.   Constitutional:       Appearance: He is not ill-appearing.      Interventions: Nasal cannula in place.   HENT:      Head: Normocephalic and atraumatic.      Nose: Nose normal.   Eyes:      Conjunctiva/sclera: Conjunctivae normal.      Pupils: Pupils are equal, round, and reactive to light.   Cardiovascular:      Rate and Rhythm: Normal rate and regular rhythm.      Heart sounds: No murmur heard.   No friction rub.   Pulmonary:      Effort: No respiratory distress.   Abdominal:      General: Bowel sounds are normal. There is no distension.      Palpations: Abdomen is soft.   Musculoskeletal:      Cervical back: Neck supple.   Skin:     General: Skin is warm and dry.   Neurological:      Mental Status: He is alert.   Psychiatric:         Attention and Perception: Attention normal.         Mood and Affect: Mood normal.         Fluids    Intake/Output Summary (Last 24 hours) at 10/25/2021 1443  Last data filed at 10/25/2021 0915  Gross per 24 hour   Intake 250 ml   Output no documentation   Net 250 ml       Laboratory  Recent Labs     10/23/21  0220 10/24/21  0038 10/25/21  0310   WBC 48.9* 44.6* 49.4*   RBC 1.93* 2.35* 2.28*   HEMOGLOBIN 6.5* 7.7* 7.6*   HEMATOCRIT 20.5* 23.9* 22.9*   .2* 101.7* 100.4*   MCH 33.7* 32.8 33.3*   MCHC 31.7* 32.2* 33.2*   RDW 91.2* 89.4* 83.3*   PLATELETCT 45* 42* 33*     Recent Labs     10/23/21  0100 10/24/21  0038 10/25/21  0310   SODIUM 133* 131* 132*   POTASSIUM 4.6 4.6 4.1   CHLORIDE 102 102 102   CO2 21 21 21   GLUCOSE 85 96 84   BUN 19 19  16   CREATININE 0.55 0.42* 0.46*   CALCIUM 7.7* 7.7* 7.8*                   Imaging  DX-CHEST-PORTABLE (1 VIEW)   Final Result      Right PICC line placement with the tip projecting over the proximal right atrium.      IR-PICC LINE PLACEMENT W/ GUIDANCE > AGE 5   Final Result                  Ultrasound-guided PICC placement performed by qualified nursing staff as    above.               Assessment/Plan  * AML (acute myeloblastic leukemia) (HCC)  Assessment & Plan  Based on initial interpretation of BMB  Pending final pathology with cytogenetics, FISH panel for AML as well as MDS, and other PCR testing.  10/21 PICC    Post-nasal drip  Assessment & Plan  W hoarseness and dry cough -- present since last COVID infection; likely post-infectous  Sudafed as needed and follow blood pressure  Humidified O2  Ocean mist nasal spray as needed  Antihistamine PRN  10/24: Repeat COVID PCR negative. CXR clear.  10/25 Mucinex    Hyponatremia  Assessment & Plan  Stable; monitor    Elevated LDH  Assessment & Plan  LDH baseline 347  Monitor following induction    Thrombocytopenia (HCC)- (present on admission)  Assessment & Plan  Not currently bleeding, no epistaxis  Tranfuse platelets prn  Likely secondary to marrow dysfunction  Daily CBC    Leukocytosis- (present on admission)  Assessment & Plan  Likely secondary to Leukemia  S/p bone marrow biopsy 10/19/2021 by Dr. Arthur Caruso  Oncology consulted, Dr. ALEX Anguiano   Daily CBC w diff      Atrial fibrillation (HCC)- (present on admission)  Assessment & Plan  Patient follows up with Dr. Low  Currently had a cardioversion in September 2021  As per cardiology no longer on anticoagulation  Patient to continue with flecainide  10/21: EKG - SR    Anemia- (present on admission)  Assessment & Plan  S/p bone marrow biopsy 10/19/2021 by Dr. Arthur Caruso  Oncology consulted, Dr. ALEX Anguiano   Required 1 unit PRBC on 10/18/2021  Monitor with serial hemoglobins, transfuse if hemoglobin is less than  7  10/21 Transfused; daily CBC w diff  Standing transfusion order placed by Onc    Essential hypertension, benign- (present on admission)  Assessment & Plan  Continue Home Medications  Labetalol ivp prn with parameters       VTE prophylaxis: SCDs/TEDs and pharmacologic prophylaxis contraindicated due to low platelets    I have performed a physical exam and reviewed and updated ROS and Plan today (10/25/2021). In review of yesterday's note (10/24/2021), there are no changes except as documented above.

## 2021-10-25 NOTE — CARE PLAN
The patient is Stable - Low risk of patient condition declining or worsening    Shift Goals  Clinical Goals: Receive final pathology report, start chemotherapy  Patient Goals: Rest, sleep well    Progress made toward(s) clinical / shift goals:        Problem: Knowledge Deficit - Standard  Goal: Patient and family/care givers will demonstrate understanding of plan of care, disease process/condition, diagnostic tests and medications  Outcome: Progressing     Problem: Mobility  Goal: Patient's capacity to carry out activities will improve  Outcome: Progressing     Problem: Fall Risk  Goal: Patient will remain free from falls  Outcome: Progressing       Patient is not progressing towards the following goals:

## 2021-10-25 NOTE — PROGRESS NOTES
Oncology/Hematology Progress Note               Author: Chau Marquis M.D. Date & Time created: 10/25/2021  11:59 AM     CC: Pancytopenia/leukemia  Interval History:  The patient is sleeping comfortably today,  before he was not able to sleep well for the past 48 hours.  Vital signs within normal limits, the wife is at bedside.  He does have a stable CBC with severe anemia and thrombocytopenia.    Review of Systems:  Review of Systems   Constitutional: Positive for malaise/fatigue. Negative for chills, fever and weight loss.   HENT: Negative for congestion, ear discharge, ear pain, hearing loss, nosebleeds and tinnitus.    Eyes: Negative for blurred vision, double vision and photophobia.   Respiratory: Negative for cough, hemoptysis, sputum production and shortness of breath.    Cardiovascular: Negative for chest pain, palpitations and orthopnea.   Gastrointestinal: Negative for abdominal pain, heartburn, nausea and vomiting.   Genitourinary: Negative for dysuria and urgency.   Skin: Negative for rash.   Neurological: Negative for dizziness and headaches.   Endo/Heme/Allergies: Negative for environmental allergies. Does not bruise/bleed easily.   Psychiatric/Behavioral: Negative for depression, substance abuse and suicidal ideas.       Physical Exam:  Physical Exam  Constitutional:       Appearance: Normal appearance.   Eyes:      Extraocular Movements: Extraocular movements intact.      Conjunctiva/sclera: Conjunctivae normal.   Cardiovascular:      Rate and Rhythm: Normal rate and regular rhythm.      Pulses: Normal pulses.      Heart sounds: Normal heart sounds.   Pulmonary:      Effort: Pulmonary effort is normal.      Breath sounds: Normal breath sounds.   Musculoskeletal:      Cervical back: Normal range of motion.   Neurological:      General: No focal deficit present.      Mental Status: He is alert and oriented to person, place, and time.   Psychiatric:         Mood and Affect: Mood normal.         Labs:           Recent Labs     10/23/21  0100 10/24/21  0038 10/25/21  0310   SODIUM 133* 131* 132*   POTASSIUM 4.6 4.6 4.1   CHLORIDE 102 102 102   CO2    BUN 19 19 16   CREATININE 0.55 0.42* 0.46*   CALCIUM 7.7* 7.7* 7.8*     Recent Labs     10/23/21  0100 10/24/21  0038 10/25/21  0310   GLUCOSE 85 96 84     Recent Labs     10/23/21  0220 10/24/21  0038 10/25/21  0310   RBC 1.93* 2.35* 2.28*   HEMOGLOBIN 6.5* 7.7* 7.6*   HEMATOCRIT 20.5* 23.9* 22.9*   PLATELETCT 45* 42* 33*     Recent Labs     10/23/21  0220 10/24/21  0038 10/25/21  0310   WBC 48.9* 44.6* 49.4*   NEUTSPOLYS 6.60* 2.80* 9.10*   LYMPHOCYTES 56.20* 49.50* 49.50*   MONOCYTES 2.50 14.70* 8.10   EOSINOPHILS 0.00 0.00 0.00   BASOPHILS 0.00 0.00 0.00     Recent Labs     10/23/21  0100 10/24/21  0038 10/25/21  0310   SODIUM 133* 131* 132*   POTASSIUM 4.6 4.6 4.1   CHLORIDE 102 102 102   CO2    GLUCOSE 85 96 84   BUN  16   CREATININE 0.55 0.42* 0.46*   CALCIUM 7.7* 7.7* 7.8*     Hemodynamics:  Temp (24hrs), Av.9 °C (98.5 °F), Min:36.6 °C (97.8 °F), Max:37.2 °C (99 °F)  Temperature: 36.7 °C (98 °F)  Pulse  Av.4  Min: 67  Max: 103   Blood Pressure : 109/52     Respiratory:    Respiration: 16, Pulse Oximetry: 95 %     Work Of Breathing / Effort: Mild  RUL Breath Sounds: Diminished, RML Breath Sounds: Diminished, RLL Breath Sounds: Diminished, JOE Breath Sounds: Diminished, LLL Breath Sounds: Diminished  Fluids:    Intake/Output Summary (Last 24 hours) at 10/23/2021 1150  Last data filed at 10/23/2021 0900  Gross per 24 hour   Intake 1310 ml   Output --   Net 1310 ml        GI/Nutrition:  Orders Placed This Encounter   Procedures   • Diet Order Diet: Cardiac     Standing Status:   Standing     Number of Occurrences:   1     Order Specific Question:   Diet:     Answer:   Cardiac [6]     Medical Decision Making, by Problem:  Active Hospital Problems    Diagnosis    • *AML (acute myeloblastic leukemia) (HCC) [C92.00]    • Post-nasal drip  [R09.82]    • Elevated LDH [R74.02]    • Hyponatremia [E87.1]    • Leukocytosis [D72.829]    • Thrombocytopenia (HCC) [D69.6]    • Atrial fibrillation (HCC) [I48.91]    • Anemia [D64.9]    • Essential hypertension, benign [I10]      Procedures    PICC line right arm 10/22/2021    Impression  1.  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  Dr. Mims still waiting on some final test before he can fully release his final report.  Normal B12 and folic acid.  Normal fibrinogen    2.  Leukocytosis/anemia/thrombocytopenia all secondary to #1    3.  History of atrial fibrillation post cardioversion September 2021 on flecainide    4.  History of hypertension    5.  Covid infection in July 2021 with chronic sinus symptoms  -10/24 repeated Covid test negative by PCR    Plan  - FISH and molecular testing negative, awaiting for cytogenetics today, will hopefully have a final report tomorrow, plan is to start Vidaza and venetoclax in the inpatient setting.  Discussed case with Dr. Mims.  -Venetoclax will get started on a ramp-up dose of 10 mg day 1, 20 mg day 2, 50 mg day  3 and will reach 100 mg dose and will stay on 100 mg starting on day 4  (dose adjusted for voriconazole)  -We will get started with prophylactic voriconazole and acyclovir tomorrow  -Reviewed chest x-ray unremarkable  -Continue checking daily labs,  -Discussed case with wife   -Check daily uric acid, we will start also on allopurinol 300 mg daily.        high complexity/complex decision making      Quality-Core Measures

## 2021-10-26 LAB
ALBUMIN SERPL BCP-MCNC: 2.7 G/DL (ref 3.2–4.9)
ALBUMIN/GLOB SERPL: 1 G/DL
ALP SERPL-CCNC: 125 U/L (ref 30–99)
ALT SERPL-CCNC: 32 U/L (ref 2–50)
ANION GAP SERPL CALC-SCNC: 10 MMOL/L (ref 7–16)
ANION GAP SERPL CALC-SCNC: 8 MMOL/L (ref 7–16)
ANISOCYTOSIS BLD QL SMEAR: ABNORMAL
AST SERPL-CCNC: 58 U/L (ref 12–45)
BASOPHILS # BLD AUTO: 0.2 % (ref 0–1.8)
BASOPHILS # BLD: 0.09 K/UL (ref 0–0.12)
BILIRUB SERPL-MCNC: 0.7 MG/DL (ref 0.1–1.5)
BUN SERPL-MCNC: 18 MG/DL (ref 8–22)
BUN SERPL-MCNC: 20 MG/DL (ref 8–22)
CALCIUM SERPL-MCNC: 7.9 MG/DL (ref 8.5–10.5)
CALCIUM SERPL-MCNC: 7.9 MG/DL (ref 8.5–10.5)
CHLORIDE SERPL-SCNC: 102 MMOL/L (ref 96–112)
CHLORIDE SERPL-SCNC: 103 MMOL/L (ref 96–112)
CO2 SERPL-SCNC: 21 MMOL/L (ref 20–33)
CO2 SERPL-SCNC: 22 MMOL/L (ref 20–33)
COMMENT 1642: NORMAL
CREAT SERPL-MCNC: 0.48 MG/DL (ref 0.5–1.4)
CREAT SERPL-MCNC: 0.51 MG/DL (ref 0.5–1.4)
EOSINOPHIL # BLD AUTO: 0 K/UL (ref 0–0.51)
EOSINOPHIL NFR BLD: 0 % (ref 0–6.9)
ERYTHROCYTE [DISTWIDTH] IN BLOOD BY AUTOMATED COUNT: 83.7 FL (ref 35.9–50)
GLOBULIN SER CALC-MCNC: 2.8 G/DL (ref 1.9–3.5)
GLUCOSE SERPL-MCNC: 127 MG/DL (ref 65–99)
GLUCOSE SERPL-MCNC: 87 MG/DL (ref 65–99)
HCT VFR BLD AUTO: 23.5 % (ref 42–52)
HGB BLD-MCNC: 7.6 G/DL (ref 14–18)
IMM GRANULOCYTES # BLD AUTO: 0 K/UL (ref 0–0.11)
IMM GRANULOCYTES NFR BLD AUTO: 0 % (ref 0–0.9)
LYMPHOCYTES # BLD AUTO: 28.52 K/UL (ref 1–4.8)
LYMPHOCYTES NFR BLD: 60.6 % (ref 22–41)
MACROCYTES BLD QL SMEAR: ABNORMAL
MCH RBC QN AUTO: 32.9 PG (ref 27–33)
MCHC RBC AUTO-ENTMCNC: 32.3 G/DL (ref 33.7–35.3)
MCV RBC AUTO: 101.7 FL (ref 81.4–97.8)
MICROCYTES BLD QL SMEAR: ABNORMAL
MONOCYTES # BLD AUTO: 14.89 K/UL (ref 0–0.85)
MONOCYTES NFR BLD AUTO: 31.7 % (ref 0–13.4)
MORPHOLOGY BLD-IMP: NORMAL
NEUTROPHILS # BLD AUTO: 3.54 K/UL (ref 1.82–7.42)
NEUTROPHILS NFR BLD: 7.5 % (ref 44–72)
NRBC # BLD AUTO: 2.59 K/UL
NRBC BLD-RTO: 5.5 /100 WBC
OVALOCYTES BLD QL SMEAR: NORMAL
PHOSPHATE SERPL-MCNC: 3.3 MG/DL (ref 2.5–4.5)
PLATELET # BLD AUTO: 34 K/UL (ref 164–446)
PLATELET BLD QL SMEAR: NORMAL
PLATELETS.RETICULATED NFR BLD AUTO: 28.4 K/UL (ref 0.6–13.1)
POIKILOCYTOSIS BLD QL SMEAR: NORMAL
POLYCHROMASIA BLD QL SMEAR: NORMAL
POTASSIUM SERPL-SCNC: 4 MMOL/L (ref 3.6–5.5)
POTASSIUM SERPL-SCNC: 4.2 MMOL/L (ref 3.6–5.5)
PROT SERPL-MCNC: 5.5 G/DL (ref 6–8.2)
RBC # BLD AUTO: 2.31 M/UL (ref 4.7–6.1)
RBC BLD AUTO: PRESENT
SODIUM SERPL-SCNC: 133 MMOL/L (ref 135–145)
SODIUM SERPL-SCNC: 133 MMOL/L (ref 135–145)
URATE SERPL-MCNC: 4.1 MG/DL (ref 2.5–8.3)
WBC # BLD AUTO: 47 K/UL (ref 4.8–10.8)

## 2021-10-26 PROCEDURE — 84550 ASSAY OF BLOOD/URIC ACID: CPT

## 2021-10-26 PROCEDURE — 99233 SBSQ HOSP IP/OBS HIGH 50: CPT | Performed by: NURSE PRACTITIONER

## 2021-10-26 PROCEDURE — A9270 NON-COVERED ITEM OR SERVICE: HCPCS | Performed by: STUDENT IN AN ORGANIZED HEALTH CARE EDUCATION/TRAINING PROGRAM

## 2021-10-26 PROCEDURE — 700111 HCHG RX REV CODE 636 W/ 250 OVERRIDE (IP): Performed by: INTERNAL MEDICINE

## 2021-10-26 PROCEDURE — 85055 RETICULATED PLATELET ASSAY: CPT

## 2021-10-26 PROCEDURE — 700102 HCHG RX REV CODE 250 W/ 637 OVERRIDE(OP): Performed by: INTERNAL MEDICINE

## 2021-10-26 PROCEDURE — A9270 NON-COVERED ITEM OR SERVICE: HCPCS | Performed by: INTERNAL MEDICINE

## 2021-10-26 PROCEDURE — 700102 HCHG RX REV CODE 250 W/ 637 OVERRIDE(OP): Performed by: STUDENT IN AN ORGANIZED HEALTH CARE EDUCATION/TRAINING PROGRAM

## 2021-10-26 PROCEDURE — 94640 AIRWAY INHALATION TREATMENT: CPT

## 2021-10-26 PROCEDURE — 85025 COMPLETE CBC W/AUTO DIFF WBC: CPT

## 2021-10-26 PROCEDURE — 80048 BASIC METABOLIC PNL TOTAL CA: CPT

## 2021-10-26 PROCEDURE — 700102 HCHG RX REV CODE 250 W/ 637 OVERRIDE(OP): Performed by: NURSE PRACTITIONER

## 2021-10-26 PROCEDURE — 84100 ASSAY OF PHOSPHORUS: CPT

## 2021-10-26 PROCEDURE — 700102 HCHG RX REV CODE 250 W/ 637 OVERRIDE(OP): Performed by: GENERAL PRACTICE

## 2021-10-26 PROCEDURE — 80053 COMPREHEN METABOLIC PANEL: CPT

## 2021-10-26 PROCEDURE — 94760 N-INVAS EAR/PLS OXIMETRY 1: CPT

## 2021-10-26 PROCEDURE — 700101 HCHG RX REV CODE 250: Performed by: INTERNAL MEDICINE

## 2021-10-26 PROCEDURE — 700105 HCHG RX REV CODE 258: Performed by: INTERNAL MEDICINE

## 2021-10-26 PROCEDURE — A9270 NON-COVERED ITEM OR SERVICE: HCPCS | Performed by: GENERAL PRACTICE

## 2021-10-26 PROCEDURE — 770004 HCHG ROOM/CARE - ONCOLOGY PRIVATE *

## 2021-10-26 PROCEDURE — A9270 NON-COVERED ITEM OR SERVICE: HCPCS | Performed by: NURSE PRACTITIONER

## 2021-10-26 RX ORDER — ONDANSETRON 8 MG/1
8 TABLET, ORALLY DISINTEGRATING ORAL EVERY 8 HOURS PRN
Status: CANCELLED | OUTPATIENT
Start: 2021-10-31

## 2021-10-26 RX ORDER — LORAZEPAM 2 MG/ML
0.5 INJECTION INTRAMUSCULAR EVERY 6 HOURS PRN
Status: CANCELLED | OUTPATIENT
Start: 2021-10-31

## 2021-10-26 RX ORDER — ONDANSETRON 2 MG/ML
8 INJECTION INTRAMUSCULAR; INTRAVENOUS ONCE
Status: CANCELLED | OUTPATIENT
Start: 2021-10-27 | End: 2021-10-27

## 2021-10-26 RX ORDER — ONDANSETRON 2 MG/ML
8 INJECTION INTRAMUSCULAR; INTRAVENOUS ONCE
Status: CANCELLED | OUTPATIENT
Start: 2021-10-30 | End: 2021-10-30

## 2021-10-26 RX ORDER — ONDANSETRON 2 MG/ML
8 INJECTION INTRAMUSCULAR; INTRAVENOUS EVERY 8 HOURS PRN
Status: CANCELLED | OUTPATIENT
Start: 2021-10-31

## 2021-10-26 RX ORDER — PROCHLORPERAZINE MALEATE 10 MG
10 TABLET ORAL EVERY 6 HOURS PRN
Status: CANCELLED | OUTPATIENT
Start: 2021-10-30

## 2021-10-26 RX ORDER — HEPARIN SODIUM (PORCINE) LOCK FLUSH IV SOLN 100 UNIT/ML 100 UNIT/ML
500 SOLUTION INTRAVENOUS PRN
Status: CANCELLED | OUTPATIENT
Start: 2021-10-30

## 2021-10-26 RX ORDER — ONDANSETRON 8 MG/1
8 TABLET, ORALLY DISINTEGRATING ORAL EVERY 8 HOURS PRN
Status: CANCELLED | OUTPATIENT
Start: 2021-10-26

## 2021-10-26 RX ORDER — PROCHLORPERAZINE EDISYLATE 5 MG/ML
10 INJECTION INTRAMUSCULAR; INTRAVENOUS EVERY 6 HOURS PRN
Status: CANCELLED | OUTPATIENT
Start: 2021-10-28

## 2021-10-26 RX ORDER — ONDANSETRON 2 MG/ML
8 INJECTION INTRAMUSCULAR; INTRAVENOUS ONCE
Status: CANCELLED | OUTPATIENT
Start: 2021-11-01 | End: 2021-11-01

## 2021-10-26 RX ORDER — PROCHLORPERAZINE MALEATE 10 MG
10 TABLET ORAL EVERY 6 HOURS PRN
Status: CANCELLED | OUTPATIENT
Start: 2021-10-26

## 2021-10-26 RX ORDER — 0.9 % SODIUM CHLORIDE 0.9 %
10 VIAL (ML) INJECTION PRN
Status: CANCELLED | OUTPATIENT
Start: 2021-10-29

## 2021-10-26 RX ORDER — ONDANSETRON 8 MG/1
8 TABLET, ORALLY DISINTEGRATING ORAL EVERY 8 HOURS PRN
Status: CANCELLED | OUTPATIENT
Start: 2021-10-30

## 2021-10-26 RX ORDER — HEPARIN SODIUM (PORCINE) LOCK FLUSH IV SOLN 100 UNIT/ML 100 UNIT/ML
500 SOLUTION INTRAVENOUS PRN
Status: CANCELLED | OUTPATIENT
Start: 2021-10-26

## 2021-10-26 RX ORDER — PROCHLORPERAZINE EDISYLATE 5 MG/ML
10 INJECTION INTRAMUSCULAR; INTRAVENOUS EVERY 6 HOURS PRN
Status: CANCELLED | OUTPATIENT
Start: 2021-10-29

## 2021-10-26 RX ORDER — ONDANSETRON 8 MG/1
8 TABLET, ORALLY DISINTEGRATING ORAL EVERY 8 HOURS PRN
Status: CANCELLED | OUTPATIENT
Start: 2021-11-01

## 2021-10-26 RX ORDER — ONDANSETRON 2 MG/ML
8 INJECTION INTRAMUSCULAR; INTRAVENOUS ONCE
Status: CANCELLED | OUTPATIENT
Start: 2021-10-29 | End: 2021-10-29

## 2021-10-26 RX ORDER — LORAZEPAM 2 MG/ML
0.5 INJECTION INTRAMUSCULAR EVERY 6 HOURS PRN
Status: CANCELLED | OUTPATIENT
Start: 2021-10-28

## 2021-10-26 RX ORDER — 0.9 % SODIUM CHLORIDE 0.9 %
10 VIAL (ML) INJECTION PRN
Status: CANCELLED | OUTPATIENT
Start: 2021-10-31

## 2021-10-26 RX ORDER — 0.9 % SODIUM CHLORIDE 0.9 %
VIAL (ML) INJECTION PRN
Status: CANCELLED | OUTPATIENT
Start: 2021-10-30

## 2021-10-26 RX ORDER — VORICONAZOLE 200 MG/1
200 TABLET, FILM COATED ORAL 2 TIMES DAILY
Status: DISCONTINUED | OUTPATIENT
Start: 2021-10-26 | End: 2021-11-05 | Stop reason: HOSPADM

## 2021-10-26 RX ORDER — 0.9 % SODIUM CHLORIDE 0.9 %
VIAL (ML) INJECTION PRN
Status: CANCELLED | OUTPATIENT
Start: 2021-10-28

## 2021-10-26 RX ORDER — SODIUM CHLORIDE 9 MG/ML
INJECTION, SOLUTION INTRAVENOUS CONTINUOUS
Status: CANCELLED | OUTPATIENT
Start: 2021-10-26

## 2021-10-26 RX ORDER — ONDANSETRON 2 MG/ML
8 INJECTION INTRAMUSCULAR; INTRAVENOUS EVERY 8 HOURS PRN
Status: CANCELLED | OUTPATIENT
Start: 2021-10-26

## 2021-10-26 RX ORDER — LORAZEPAM 0.5 MG/1
0.5 TABLET ORAL EVERY 6 HOURS PRN
Status: CANCELLED | OUTPATIENT
Start: 2021-10-29

## 2021-10-26 RX ORDER — HEPARIN SODIUM (PORCINE) LOCK FLUSH IV SOLN 100 UNIT/ML 100 UNIT/ML
500 SOLUTION INTRAVENOUS PRN
Status: CANCELLED | OUTPATIENT
Start: 2021-10-29

## 2021-10-26 RX ORDER — SODIUM CHLORIDE 9 MG/ML
INJECTION, SOLUTION INTRAVENOUS CONTINUOUS
Status: DISCONTINUED | OUTPATIENT
Start: 2021-10-26 | End: 2021-10-27

## 2021-10-26 RX ORDER — HEPARIN SODIUM (PORCINE) LOCK FLUSH IV SOLN 100 UNIT/ML 100 UNIT/ML
500 SOLUTION INTRAVENOUS PRN
Status: CANCELLED | OUTPATIENT
Start: 2021-10-31

## 2021-10-26 RX ORDER — PROCHLORPERAZINE MALEATE 10 MG
10 TABLET ORAL EVERY 6 HOURS PRN
Status: CANCELLED | OUTPATIENT
Start: 2021-10-31

## 2021-10-26 RX ORDER — PROCHLORPERAZINE MALEATE 10 MG
10 TABLET ORAL EVERY 6 HOURS PRN
Status: CANCELLED | OUTPATIENT
Start: 2021-10-29

## 2021-10-26 RX ORDER — 0.9 % SODIUM CHLORIDE 0.9 %
VIAL (ML) INJECTION PRN
Status: CANCELLED | OUTPATIENT
Start: 2021-10-27

## 2021-10-26 RX ORDER — HEPARIN SODIUM (PORCINE) LOCK FLUSH IV SOLN 100 UNIT/ML 100 UNIT/ML
500 SOLUTION INTRAVENOUS PRN
Status: CANCELLED | OUTPATIENT
Start: 2021-10-27

## 2021-10-26 RX ORDER — HEPARIN SODIUM (PORCINE) LOCK FLUSH IV SOLN 100 UNIT/ML 100 UNIT/ML
500 SOLUTION INTRAVENOUS PRN
Status: CANCELLED | OUTPATIENT
Start: 2021-10-28

## 2021-10-26 RX ORDER — LORAZEPAM 0.5 MG/1
0.5 TABLET ORAL EVERY 6 HOURS PRN
Status: CANCELLED | OUTPATIENT
Start: 2021-10-27

## 2021-10-26 RX ORDER — HEPARIN SODIUM (PORCINE) LOCK FLUSH IV SOLN 100 UNIT/ML 100 UNIT/ML
500 SOLUTION INTRAVENOUS PRN
Status: CANCELLED | OUTPATIENT
Start: 2021-11-01

## 2021-10-26 RX ORDER — 0.9 % SODIUM CHLORIDE 0.9 %
VIAL (ML) INJECTION PRN
Status: CANCELLED | OUTPATIENT
Start: 2021-10-29

## 2021-10-26 RX ORDER — LORAZEPAM 0.5 MG/1
0.5 TABLET ORAL EVERY 6 HOURS PRN
Status: CANCELLED | OUTPATIENT
Start: 2021-10-26

## 2021-10-26 RX ORDER — ACYCLOVIR 200 MG/1
400 CAPSULE ORAL 2 TIMES DAILY
Status: DISCONTINUED | OUTPATIENT
Start: 2021-10-26 | End: 2021-11-05 | Stop reason: HOSPADM

## 2021-10-26 RX ORDER — 0.9 % SODIUM CHLORIDE 0.9 %
10 VIAL (ML) INJECTION PRN
Status: CANCELLED | OUTPATIENT
Start: 2021-10-30

## 2021-10-26 RX ORDER — ONDANSETRON 8 MG/1
8 TABLET, ORALLY DISINTEGRATING ORAL EVERY 8 HOURS PRN
Status: CANCELLED | OUTPATIENT
Start: 2021-10-27

## 2021-10-26 RX ORDER — 0.9 % SODIUM CHLORIDE 0.9 %
3 VIAL (ML) INJECTION PRN
Status: CANCELLED | OUTPATIENT
Start: 2021-10-29

## 2021-10-26 RX ORDER — ALLOPURINOL 300 MG/1
300 TABLET ORAL DAILY
Status: DISCONTINUED | OUTPATIENT
Start: 2021-10-26 | End: 2021-11-05 | Stop reason: HOSPADM

## 2021-10-26 RX ORDER — PROCHLORPERAZINE EDISYLATE 5 MG/ML
10 INJECTION INTRAMUSCULAR; INTRAVENOUS EVERY 6 HOURS PRN
Status: CANCELLED | OUTPATIENT
Start: 2021-11-01

## 2021-10-26 RX ORDER — LORAZEPAM 2 MG/ML
0.5 INJECTION INTRAMUSCULAR EVERY 6 HOURS PRN
Status: CANCELLED | OUTPATIENT
Start: 2021-11-01

## 2021-10-26 RX ORDER — 0.9 % SODIUM CHLORIDE 0.9 %
VIAL (ML) INJECTION PRN
Status: CANCELLED | OUTPATIENT
Start: 2021-10-26

## 2021-10-26 RX ORDER — 0.9 % SODIUM CHLORIDE 0.9 %
3 VIAL (ML) INJECTION PRN
Status: CANCELLED | OUTPATIENT
Start: 2021-10-28

## 2021-10-26 RX ORDER — PROCHLORPERAZINE EDISYLATE 5 MG/ML
10 INJECTION INTRAMUSCULAR; INTRAVENOUS EVERY 6 HOURS PRN
Status: CANCELLED | OUTPATIENT
Start: 2021-10-27

## 2021-10-26 RX ORDER — 0.9 % SODIUM CHLORIDE 0.9 %
10 VIAL (ML) INJECTION PRN
Status: CANCELLED | OUTPATIENT
Start: 2021-11-01

## 2021-10-26 RX ORDER — 0.9 % SODIUM CHLORIDE 0.9 %
10 VIAL (ML) INJECTION PRN
Status: CANCELLED | OUTPATIENT
Start: 2021-10-27

## 2021-10-26 RX ORDER — ONDANSETRON 2 MG/ML
8 INJECTION INTRAMUSCULAR; INTRAVENOUS EVERY 8 HOURS PRN
Status: CANCELLED | OUTPATIENT
Start: 2021-10-30

## 2021-10-26 RX ORDER — PROCHLORPERAZINE EDISYLATE 5 MG/ML
10 INJECTION INTRAMUSCULAR; INTRAVENOUS EVERY 6 HOURS PRN
Status: CANCELLED | OUTPATIENT
Start: 2021-10-30

## 2021-10-26 RX ORDER — 0.9 % SODIUM CHLORIDE 0.9 %
VIAL (ML) INJECTION PRN
Status: CANCELLED | OUTPATIENT
Start: 2021-11-01

## 2021-10-26 RX ORDER — LORAZEPAM 0.5 MG/1
0.5 TABLET ORAL EVERY 6 HOURS PRN
Status: CANCELLED | OUTPATIENT
Start: 2021-10-31

## 2021-10-26 RX ORDER — ONDANSETRON 8 MG/1
8 TABLET, ORALLY DISINTEGRATING ORAL EVERY 8 HOURS PRN
Status: CANCELLED | OUTPATIENT
Start: 2021-10-29

## 2021-10-26 RX ORDER — 0.9 % SODIUM CHLORIDE 0.9 %
10 VIAL (ML) INJECTION PRN
Status: CANCELLED | OUTPATIENT
Start: 2021-10-28

## 2021-10-26 RX ORDER — 0.9 % SODIUM CHLORIDE 0.9 %
3 VIAL (ML) INJECTION PRN
Status: CANCELLED | OUTPATIENT
Start: 2021-10-30

## 2021-10-26 RX ORDER — ONDANSETRON 2 MG/ML
8 INJECTION INTRAMUSCULAR; INTRAVENOUS ONCE
Status: CANCELLED | OUTPATIENT
Start: 2021-10-31 | End: 2021-10-31

## 2021-10-26 RX ORDER — 0.9 % SODIUM CHLORIDE 0.9 %
3 VIAL (ML) INJECTION PRN
Status: CANCELLED | OUTPATIENT
Start: 2021-10-26

## 2021-10-26 RX ORDER — ACYCLOVIR 200 MG/1
400 CAPSULE ORAL 2 TIMES DAILY
Status: CANCELLED | OUTPATIENT
Start: 2021-10-26

## 2021-10-26 RX ORDER — 0.9 % SODIUM CHLORIDE 0.9 %
VIAL (ML) INJECTION PRN
Status: CANCELLED | OUTPATIENT
Start: 2021-10-31

## 2021-10-26 RX ORDER — 0.9 % SODIUM CHLORIDE 0.9 %
3 VIAL (ML) INJECTION PRN
Status: CANCELLED | OUTPATIENT
Start: 2021-11-01

## 2021-10-26 RX ORDER — ONDANSETRON 2 MG/ML
8 INJECTION INTRAMUSCULAR; INTRAVENOUS EVERY 8 HOURS PRN
Status: CANCELLED | OUTPATIENT
Start: 2021-11-01

## 2021-10-26 RX ORDER — ONDANSETRON 2 MG/ML
8 INJECTION INTRAMUSCULAR; INTRAVENOUS EVERY 8 HOURS PRN
Status: CANCELLED | OUTPATIENT
Start: 2021-10-27

## 2021-10-26 RX ORDER — ONDANSETRON 2 MG/ML
8 INJECTION INTRAMUSCULAR; INTRAVENOUS ONCE
Status: CANCELLED | OUTPATIENT
Start: 2021-10-26 | End: 2021-10-26

## 2021-10-26 RX ORDER — PROCHLORPERAZINE MALEATE 10 MG
10 TABLET ORAL EVERY 6 HOURS PRN
Status: CANCELLED | OUTPATIENT
Start: 2021-10-28

## 2021-10-26 RX ORDER — ONDANSETRON 2 MG/ML
8 INJECTION INTRAMUSCULAR; INTRAVENOUS ONCE
Status: CANCELLED | OUTPATIENT
Start: 2021-10-28 | End: 2021-10-28

## 2021-10-26 RX ORDER — LORAZEPAM 0.5 MG/1
0.5 TABLET ORAL EVERY 6 HOURS PRN
Status: CANCELLED | OUTPATIENT
Start: 2021-11-01

## 2021-10-26 RX ORDER — LIDOCAINE AND PRILOCAINE 25; 25 MG/G; MG/G
CREAM TOPICAL ONCE
Status: CANCELLED | OUTPATIENT
Start: 2021-10-26 | End: 2021-10-26

## 2021-10-26 RX ORDER — LORAZEPAM 2 MG/ML
0.5 INJECTION INTRAMUSCULAR EVERY 6 HOURS PRN
Status: CANCELLED | OUTPATIENT
Start: 2021-10-30

## 2021-10-26 RX ORDER — PROCHLORPERAZINE EDISYLATE 5 MG/ML
10 INJECTION INTRAMUSCULAR; INTRAVENOUS EVERY 6 HOURS PRN
Status: CANCELLED | OUTPATIENT
Start: 2021-10-31

## 2021-10-26 RX ORDER — PROCHLORPERAZINE EDISYLATE 5 MG/ML
10 INJECTION INTRAMUSCULAR; INTRAVENOUS EVERY 6 HOURS PRN
Status: CANCELLED | OUTPATIENT
Start: 2021-10-26

## 2021-10-26 RX ORDER — PROCHLORPERAZINE MALEATE 10 MG
10 TABLET ORAL EVERY 6 HOURS PRN
Status: CANCELLED | OUTPATIENT
Start: 2021-10-27

## 2021-10-26 RX ORDER — ONDANSETRON 2 MG/ML
8 INJECTION INTRAMUSCULAR; INTRAVENOUS EVERY 8 HOURS PRN
Status: CANCELLED | OUTPATIENT
Start: 2021-10-29

## 2021-10-26 RX ORDER — VORICONAZOLE 200 MG/1
200 TABLET, FILM COATED ORAL 2 TIMES DAILY
Status: CANCELLED | OUTPATIENT
Start: 2021-10-26

## 2021-10-26 RX ORDER — LORAZEPAM 2 MG/ML
0.5 INJECTION INTRAMUSCULAR EVERY 6 HOURS PRN
Status: CANCELLED | OUTPATIENT
Start: 2021-10-29

## 2021-10-26 RX ORDER — LORAZEPAM 2 MG/ML
0.5 INJECTION INTRAMUSCULAR EVERY 6 HOURS PRN
Status: CANCELLED | OUTPATIENT
Start: 2021-10-27

## 2021-10-26 RX ORDER — ONDANSETRON 2 MG/ML
8 INJECTION INTRAMUSCULAR; INTRAVENOUS EVERY 8 HOURS PRN
Status: CANCELLED | OUTPATIENT
Start: 2021-10-28

## 2021-10-26 RX ORDER — 0.9 % SODIUM CHLORIDE 0.9 %
10 VIAL (ML) INJECTION PRN
Status: CANCELLED | OUTPATIENT
Start: 2021-10-26

## 2021-10-26 RX ORDER — ONDANSETRON 2 MG/ML
8 INJECTION INTRAMUSCULAR; INTRAVENOUS ONCE
Status: COMPLETED | OUTPATIENT
Start: 2021-10-26 | End: 2021-10-26

## 2021-10-26 RX ORDER — LORAZEPAM 0.5 MG/1
0.5 TABLET ORAL EVERY 6 HOURS PRN
Status: CANCELLED | OUTPATIENT
Start: 2021-10-30

## 2021-10-26 RX ORDER — 0.9 % SODIUM CHLORIDE 0.9 %
3 VIAL (ML) INJECTION PRN
Status: CANCELLED | OUTPATIENT
Start: 2021-10-27

## 2021-10-26 RX ORDER — 0.9 % SODIUM CHLORIDE 0.9 %
3 VIAL (ML) INJECTION PRN
Status: CANCELLED | OUTPATIENT
Start: 2021-10-31

## 2021-10-26 RX ORDER — PROCHLORPERAZINE MALEATE 10 MG
10 TABLET ORAL EVERY 6 HOURS PRN
Status: CANCELLED | OUTPATIENT
Start: 2021-11-01

## 2021-10-26 RX ORDER — LORAZEPAM 0.5 MG/1
0.5 TABLET ORAL EVERY 6 HOURS PRN
Status: CANCELLED | OUTPATIENT
Start: 2021-10-28

## 2021-10-26 RX ORDER — ONDANSETRON 8 MG/1
8 TABLET, ORALLY DISINTEGRATING ORAL EVERY 8 HOURS PRN
Status: CANCELLED | OUTPATIENT
Start: 2021-10-28

## 2021-10-26 RX ORDER — LORAZEPAM 2 MG/ML
0.5 INJECTION INTRAMUSCULAR EVERY 6 HOURS PRN
Status: CANCELLED | OUTPATIENT
Start: 2021-10-26

## 2021-10-26 RX ADMIN — ALLOPURINOL 300 MG: 300 TABLET ORAL at 05:31

## 2021-10-26 RX ADMIN — TRAZODONE HYDROCHLORIDE 50 MG: 100 TABLET ORAL at 20:40

## 2021-10-26 RX ADMIN — GUAIFENESIN 600 MG: 600 TABLET, EXTENDED RELEASE ORAL at 05:29

## 2021-10-26 RX ADMIN — DOCUSATE SODIUM 50 MG AND SENNOSIDES 8.6 MG 2 TABLET: 8.6; 5 TABLET, FILM COATED ORAL at 17:19

## 2021-10-26 RX ADMIN — FLECAINIDE ACETATE 50 MG: 50 TABLET ORAL at 17:20

## 2021-10-26 RX ADMIN — LORATADINE 10 MG: 10 TABLET ORAL at 20:40

## 2021-10-26 RX ADMIN — ACYCLOVIR 400 MG: 200 CAPSULE ORAL at 17:19

## 2021-10-26 RX ADMIN — ENALAPRIL MALEATE 10 MG: 10 TABLET ORAL at 05:31

## 2021-10-26 RX ADMIN — SODIUM CHLORIDE: 9 INJECTION, SOLUTION INTRAVENOUS at 12:51

## 2021-10-26 RX ADMIN — VORICONAZOLE 200 MG: 200 TABLET ORAL at 17:19

## 2021-10-26 RX ADMIN — ALLOPURINOL 300 MG: 300 TABLET ORAL at 12:51

## 2021-10-26 RX ADMIN — GUAIFENESIN 600 MG: 600 TABLET, EXTENDED RELEASE ORAL at 17:19

## 2021-10-26 RX ADMIN — ONDANSETRON 8 MG: 2 INJECTION INTRAMUSCULAR; INTRAVENOUS at 14:28

## 2021-10-26 RX ADMIN — Medication 1 TABLET: at 05:31

## 2021-10-26 RX ADMIN — VENETOCLAX 10 MG: 10 TABLET, FILM COATED ORAL at 15:00

## 2021-10-26 RX ADMIN — IPRATROPIUM BROMIDE AND ALBUTEROL SULFATE 3 ML: .5; 2.5 SOLUTION RESPIRATORY (INHALATION) at 21:04

## 2021-10-26 RX ADMIN — FERROUS SULFATE TAB 325 MG (65 MG ELEMENTAL FE) 325 MG: 325 (65 FE) TAB at 05:31

## 2021-10-26 RX ADMIN — Medication 2000 UNITS: at 05:29

## 2021-10-26 RX ADMIN — AZACITIDINE 148.5 MG: 100 INJECTION, POWDER, LYOPHILIZED, FOR SOLUTION INTRAVENOUS; SUBCUTANEOUS at 15:00

## 2021-10-26 RX ADMIN — FLECAINIDE ACETATE 50 MG: 50 TABLET ORAL at 05:31

## 2021-10-26 ASSESSMENT — ENCOUNTER SYMPTOMS
HEARTBURN: 0
SINUS PAIN: 1
FEVER: 0
WHEEZING: 0
ORTHOPNEA: 0
DIZZINESS: 0
VOMITING: 0
ABDOMINAL PAIN: 0
PALPITATIONS: 0
DEPRESSION: 0
DIAPHORESIS: 0
CHILLS: 0
COUGH: 0
NAUSEA: 0
WEIGHT LOSS: 0
HEMOPTYSIS: 0
BRUISES/BLEEDS EASILY: 0
COUGH: 1
FOCAL WEAKNESS: 0
BLURRED VISION: 0
SPUTUM PRODUCTION: 0
HEADACHES: 0
DIARRHEA: 0
PND: 0
DOUBLE VISION: 0
PHOTOPHOBIA: 0
MYALGIAS: 0
SHORTNESS OF BREATH: 0
BLOOD IN STOOL: 0

## 2021-10-26 ASSESSMENT — LIFESTYLE VARIABLES: SUBSTANCE_ABUSE: 0

## 2021-10-26 ASSESSMENT — PAIN DESCRIPTION - PAIN TYPE: TYPE: ACUTE PAIN

## 2021-10-26 ASSESSMENT — FIBROSIS 4 INDEX: FIB4 SCORE: 21.42

## 2021-10-26 NOTE — PROGRESS NOTES
Chemotherapy Verification - PRIMARY RN     Cycle 1, Day 1      Height = 177.8 cm  Weight = 79.1 kg  BSA = 1.98 m2       Medication: venetoclax (VENCLEXTA) Dose: 10 mg (set dose)    (In mg/m2, AUC, mg/kg)     Medication: azacitidine (VIDAZA)  Dose: 75 mg / m2    Calculated Dose: 148.5 mg   Ordered Dose: 148.5 mg                            (In mg/m2, AUC, mg/kg)          I confirm this process was performed independently with the BSA and all final chemotherapy dosing calculations congruent.  Any discrepancies of 10% or greater have been addressed with the chemotherapy pharmacist. The resolution of the discrepancy has been documented in the EPIC progress notes.

## 2021-10-26 NOTE — PROGRESS NOTES
"Pharmacy Chemotherapy Verification    Patient Name: Alfonso Gaspar   Dx; AML  Protocol: AzaCITIDine + venetoclax       *Dosing Reference*  AzaCITIDine 75 mg/m2 IV over 10-40 minutes daily on Days 1-7  Venetoclax dose titration for voriconazole drug interaction              -10 mg PO on Day 1 followed by              -20 mg PO on Day 2 followed by               -50 mg PO on Day 3 followed by               -100 mg PO daily on Days 4-28  28 day cycle for 1 cycle  NCCN Guidelines for Acute Myeloid Leukemia.V.3.2020  Aron MLYES et al. Lancet Oncol. 2018;19(2):216-228  Aron MYLES et al. Blood. 2019;133(1):7-17    Allergies:  Pcn [penicillins]     /59   Pulse 87   Temp 37 °C (98.6 °F) (Oral)   Resp 18   Ht 1.778 m (5' 10\")   Wt 79.1 kg (174 lb 6.1 oz)   SpO2 91%   BMI 25.02 kg/m²  Body surface area is 1.98 meters squared.    Labs 10/27/21  ANC 4070      Hgb 7.6           Plt 34k  SCr 0.61          CrCl 98 mL/min   AST/ALT/AP = 71/31/136       Tbili = 0.6  Uric Acid = 3.7            K = 4.8  Phos = 4.4     **MD aware of labs, OK to proceed with treatment as outlined below.Transfusion parameters in place for Hgb <7.**    Drug Order   (Drug name, dose, route, IV Fluid & volume, frequency, number of doses) Cycle: 1 Day 2 of 7    Previous treatment: N/A     Medication = azaCITIDtine   Base Dose = 75 mg/m2   Calc Dose: Base Dose x 1.98 m2 = 148.5 mg  Final Dose = 148.5 mg  Route = IV  Fluid & Volume =  mL  Admin Duration = Over 10-40 minutes           <10% difference, OK to treat with final dose      By my signature below, I confirm this process was performed independently with the BSA and all final chemotherapy dosing calculations congruent. I have reviewed the above chemotherapy order and that my calculation of the final dose and BSA (when applicable) corroborate those calculations of the  pharmacist. Discrepancies of 5% or greater in the written dose have been addressed and documented within " the EPIC Progress notes.    Signature: Diane Leroy, PharmD, BCOP

## 2021-10-26 NOTE — PROGRESS NOTES
Oncology/Hematology Progress Note               Author: Chau Marquis M.D. Date & Time created: 10/26/2021  12:13 PM     CC: Pancytopenia/leukemia  Interval History:  The patient is feeling okay today, she continues to have congestion.  Otherwise no fever, chills.  He will get started today with Vidaza and venetoclax for acute myeloid leukemia.  The patient is in good spirits, he was answering questions appropriately.  Wife at bedside, no shortness of breath.  Main complaint is fatigue.    PMHx, PSurgHx, SocHx, FAMHx: Reviewed and unchanged    Review of Systems:  Review of Systems   Constitutional: Positive for malaise/fatigue. Negative for chills, fever and weight loss.   HENT: Negative for congestion, ear discharge, ear pain, hearing loss, nosebleeds and tinnitus.    Eyes: Negative for blurred vision, double vision and photophobia.   Respiratory: Negative for cough, hemoptysis, sputum production and shortness of breath.    Cardiovascular: Negative for chest pain, palpitations and orthopnea.   Gastrointestinal: Negative for abdominal pain, heartburn, nausea and vomiting.   Genitourinary: Negative for dysuria and urgency.   Skin: Negative for rash.   Neurological: Negative for dizziness and headaches.   Endo/Heme/Allergies: Negative for environmental allergies. Does not bruise/bleed easily.   Psychiatric/Behavioral: Negative for depression, substance abuse and suicidal ideas.       Physical Exam:  Physical Exam  Constitutional:       Appearance: Normal appearance.   Eyes:      Extraocular Movements: Extraocular movements intact.      Conjunctiva/sclera: Conjunctivae normal.   Cardiovascular:      Rate and Rhythm: Normal rate and regular rhythm.      Pulses: Normal pulses.      Heart sounds: Normal heart sounds.   Pulmonary:      Effort: Pulmonary effort is normal.      Breath sounds: Normal breath sounds.   Musculoskeletal:      Cervical back: Normal range of motion.   Neurological:      General: No focal deficit  present.      Mental Status: He is alert and oriented to person, place, and time.   Psychiatric:         Mood and Affect: Mood normal.         Labs:          Recent Labs     10/24/21  0038 10/25/21  0310 10/26/21  0005   SODIUM 131* 132* 133*   POTASSIUM 4.6 4.1 4.2   CHLORIDE 102 102 103   CO2    BUN 19 16 18   CREATININE 0.42* 0.46* 0.48*   CALCIUM 7.7* 7.8* 7.9*     Recent Labs     10/24/21  0038 10/25/21  0310 10/26/21  0005   GLUCOSE 96 84 87     Recent Labs     10/24/21  0038 10/25/21  0310 10/26/21  0005   RBC 2.35* 2.28* 2.31*   HEMOGLOBIN 7.7* 7.6* 7.6*   HEMATOCRIT 23.9* 22.9* 23.5*   PLATELETCT 42* 33* 34*     Recent Labs     10/24/21  0038 10/25/21  0310 10/26/21  0005   WBC 44.6* 49.4* 47.0*   NEUTSPOLYS 2.80* 9.10* 7.50*   LYMPHOCYTES 49.50* 49.50* 60.60*   MONOCYTES 14.70* 8.10 31.70*   EOSINOPHILS 0.00 0.00 0.00   BASOPHILS 0.00 0.00 0.20     Recent Labs     10/24/21  0038 10/25/21  0310 10/26/21  0005   SODIUM 131* 132* 133*   POTASSIUM 4.6 4.1 4.2   CHLORIDE 102 102 103   CO2    GLUCOSE 96 84 87   BUN 19 16 18   CREATININE 0.42* 0.46* 0.48*   CALCIUM 7.7* 7.8* 7.9*     Hemodynamics:  Temp (24hrs), Av.8 °C (98.2 °F), Min:36.4 °C (97.5 °F), Max:37 °C (98.6 °F)  Temperature: 37 °C (98.6 °F)  Pulse  Av.4  Min: 67  Max: 103   Blood Pressure : 105/59     Respiratory:    Respiration: 18, Pulse Oximetry: 91 %     Work Of Breathing / Effort: Mild  RUL Breath Sounds: Clear, RML Breath Sounds: Diminished, RLL Breath Sounds: Diminished, JOE Breath Sounds: Clear, LLL Breath Sounds: Diminished  Fluids:    Intake/Output Summary (Last 24 hours) at 10/23/2021 1150  Last data filed at 10/23/2021 0900  Gross per 24 hour   Intake 1310 ml   Output --   Net 1310 ml     Weight: 79.1 kg (174 lb 6.1 oz)  GI/Nutrition:  Orders Placed This Encounter   Procedures   • Diet Order Diet: Cardiac     Standing Status:   Standing     Number of Occurrences:   1     Order Specific Question:   Diet:     Answer:    Cardiac [6]     Medical Decision Making, by Problem:  Active Hospital Problems    Diagnosis    • *AML (acute myeloblastic leukemia) (HCC) [C92.00]    • Post-nasal drip [R09.82]    • Elevated LDH [R74.02]    • Hyponatremia [E87.1]    • Leukocytosis [D72.829]    • Thrombocytopenia (HCC) [D69.6]    • Atrial fibrillation (HCC) [I48.91]    • Anemia [D64.9]    • Essential hypertension, benign [I10]      Procedures    PICC line right arm 10/22/2021    Impression  1.  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  Negative mutation status  (IDH1/2, FLT3, NPM 1 and CEBPA). FISH negative.  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  Leukocytosis/anemia/thrombocytopenia all secondary to #1  -Use if hemoglobin less than 7 g/dL or platelets less than 10,000    3.  History of atrial fibrillation post cardioversion September 2021 on flecainide    4.  History of hypertension    5.  Covid infection in July 2021 with chronic sinus symptoms  -10/24 repeated Covid test negative by PCR     6.  Tumor lysis prophylaxis  -On allopurinol 300 mg daily    7.  Antibiotic prophylaxis with acyclovir and voriconazole    Plan  -Discussed with patient and wife about diagnosis of acute myeloid leukemia with complex cytogenetics,  -We will get started on Vidaza daily for 7 days and venetoclax with a ramp-up dose at 10 mg on day 1, 20 mg on day 2, 50 mg on day 3 and 100 mg daily  - tumor lysis prevention with daily labs including CMP, magnesium, uric acid and phosphorus.  Continue on allopurinol  -Starting antibiotic prophylaxis  -Transfuse blood products with parameters as stated above  -Explained them to expect hospital stay for 3-4 weeks  -We will continue following closely    high complexity/complex decision making      Quality-Core  Measures

## 2021-10-26 NOTE — PROGRESS NOTES
"Pharmacy Chemotherapy Verification  Patient Name: Alfonso Gaspar   Dx: AML  Cycle: 1 (Previous treatment = N/A)  Regimen and Dosing Reference  AzaCITIDine 75 mg/m2 IV over 10-40 minutes daily on Days 1-7  Venetoclax dose titration for voriconazole drug interaction   -10 mg PO on Day 1 followed by   -20 mg PO on Day 2 followed by    -50 mg PO on Day 3 followed by    -100 mg PO daily on Days 4-28  28 day cycle for 1 cycle  NCCN Guidelines for Acute Myeloid Leukemia.V.3.2020  Aron MYLES et al. Lancet Oncol. 2018;19(2):216-228  Aron MYLES, et al. Blood. 2019;133(1):7-17    Allergies:Pcn [penicillins]  /59   Pulse 87   Temp 37 °C (98.6 °F) (Oral)   Resp 18   Ht 1.778 m (5' 10\")   Wt 79.1 kg (174 lb 6.1 oz)   SpO2 91%   BMI 25.02 kg/m²   Body surface area is 1.98 meters squared.    Labs 10/26/21  ANC 3540 Hgb 7.6 Plt 34k  SCr 0.48 CrCl 98 mL/min     Labs 10/20/24  AST/ALT/AP = 39/17/112 Tbili = 0.6  Uric Acid = 4.1  LDH = 347    **MD aware of labs, OK to proceed with treatment as outlined below.**    AzaCITIDine 75 mg/m2 x 1.98 m2 = 148.5 mg   <10% difference, OK to treat with final dose = 148.5 mg IV daily on Days 1-7    Diane Leroy, PharmD, BCOP    "

## 2021-10-26 NOTE — CARE PLAN
The patient is Stable - Low risk of patient condition declining or worsening    Shift Goals  Clinical Goals: Rest  Patient Goals: Feel less fatigued, Rest    Progress made toward(s) clinical / shift goals:    Problem: Knowledge Deficit - Standard  Goal: Patient and family/care givers will demonstrate understanding of plan of care, disease process/condition, diagnostic tests and medications  Outcome: Progressing  Note: Pt was kept up to date on the plan of care. Hgb came back at 7.6 and did not change from the previous result. Pt still remains feeling fatigued but stated did not sleep well throughout the night. Will continue to work on Pt fatigue and help them get more rest.      Problem: Self Care  Goal: Patient will have the ability to perform ADLs independently or with assistance (bathe, groom, dress, toilet and feed)  Outcome: Progressing  Note: Pt rested throughout the night. Was able to use the rest room as a standby with a front wheel walker. Continues to performs ADLs.       Patient is not progressing towards the following goals:

## 2021-10-26 NOTE — PROGRESS NOTES
Chemotherapy Verification - SECONDARY RN  Cycle 1 Day 1       Height = 177.8 cm  Weight = 79.1 kg  BSA = 1.98 m2       Medication: vidaza  Dose: 75 mg/m2  Calculated Dose: 73.02 mg ordered=  148.5 mg ordered= 148.5 mg                            (In mg/m2, AUC, mg/kg)       I confirm that this process was performed independently.

## 2021-10-26 NOTE — PROGRESS NOTES
"Pharmacy Chemotherapy verification:    Patient Name: Alfonso Gaspar   Dx: AML         Protocol: azacitadine + venetoclax     AzaCITIDine 75 mg/m2 IV over 10-40 minutes daily on Days 1-5  Venetoclax dose titration for voriconazole drug interaction              -10 mg PO on Day 1 followed by              -20 mg PO on Day 2 followed by               -50 mg PO on Day 3 followed by               -100 mg PO daily on Days 3-28  28 day cycle for 1 cycle  NCCN Guidelines for Acute Myeloid Leukemia.V.3.2020  Aron MYLES et al. Lancet Oncol. 2018;19(2):216-228  Aron MYLES et al. Blood. 2019;133(1):7-17    Allergies:  Pcn [penicillins]     /59   Pulse 87   Temp 37 °C (98.6 °F) (Oral)   Resp 18   Ht 1.778 m (5' 10\")   Wt 79.1 kg (174 lb 6.1 oz)   SpO2 91%   BMI 25.02 kg/m²  Body surface area is 1.98 meters squared.    All lab results 10/26/21 reviewed.   MD aware of all current lab results. Orders received to proceed with treatment.     Drug Order   (Drug name, dose, route, IV Fluid & volume, frequency, number of doses) Cycle: 1   Day 1 of 5    Previous treatment: none     Medication = azacitadine  Base Dose= 75mg/m2  Calc Dose: Base Dose x 1.98m2 = 148.5mg  Final Dose = 148.5mg  Route = IV  Fluid & Volume =  mL  Admin Duration = Over 10-40min          <10% difference, ok to treat with final dose   By my signature below, I confirm this process was performed independently with the BSA and all final chemotherapy dosing calculations congruent. I have reviewed the above chemotherapy order and that my calculation of the final dose and BSA (when applicable) corroborate those calculations of the  pharmacist. Discrepancies of 10% or greater in the written dose have been addressed and documented within the [facility] Progress notes.    Mak StoneD.          "

## 2021-10-27 PROBLEM — T14.8XXA BLEEDING FROM WOUND: Status: ACTIVE | Noted: 2021-10-27

## 2021-10-27 LAB
ABO GROUP BLD: ABNORMAL
ALBUMIN SERPL BCP-MCNC: 2.6 G/DL (ref 3.2–4.9)
ALBUMIN/GLOB SERPL: 1 G/DL
ALP SERPL-CCNC: 136 U/L (ref 30–99)
ALT SERPL-CCNC: 31 U/L (ref 2–50)
ANION GAP SERPL CALC-SCNC: 10 MMOL/L (ref 7–16)
ANISOCYTOSIS BLD QL SMEAR: ABNORMAL
AST SERPL-CCNC: 71 U/L (ref 12–45)
BARCODED ABORH UBTYP: 5100
BARCODED PRD CODE UBPRD: NORMAL
BARCODED UNIT NUM UBUNT: NORMAL
BASOPHILS # BLD AUTO: 0 % (ref 0–1.8)
BASOPHILS # BLD: 0 K/UL (ref 0–0.12)
BILIRUB SERPL-MCNC: 0.6 MG/DL (ref 0.1–1.5)
BLASTS NFR BLD MANUAL: 43.3 %
BLD GP AB SCN SERPL QL: ABNORMAL
BUN SERPL-MCNC: 22 MG/DL (ref 8–22)
CALCIUM SERPL-MCNC: 7.5 MG/DL (ref 8.5–10.5)
CHLORIDE SERPL-SCNC: 102 MMOL/L (ref 96–112)
CO2 SERPL-SCNC: 20 MMOL/L (ref 20–33)
COMPONENT P 8504P: NORMAL
CREAT SERPL-MCNC: 0.61 MG/DL (ref 0.5–1.4)
EOSINOPHIL # BLD AUTO: 0 K/UL (ref 0–0.51)
EOSINOPHIL NFR BLD: 0 % (ref 0–6.9)
ERYTHROCYTE [DISTWIDTH] IN BLOOD BY AUTOMATED COUNT: 83.2 FL (ref 35.9–50)
GIANT PLATELETS BLD QL SMEAR: NORMAL
GLOBULIN SER CALC-MCNC: 2.6 G/DL (ref 1.9–3.5)
GLUCOSE SERPL-MCNC: 102 MG/DL (ref 65–99)
HCT VFR BLD AUTO: 23.5 % (ref 42–52)
HGB BLD-MCNC: 6 G/DL (ref 14–18)
HGB BLD-MCNC: 7.7 G/DL (ref 14–18)
LYMPHOCYTES # BLD AUTO: 11.38 K/UL (ref 1–4.8)
LYMPHOCYTES NFR BLD: 37.2 % (ref 22–41)
MACROCYTES BLD QL SMEAR: ABNORMAL
MANUAL DIFF BLD: NORMAL
MCH RBC QN AUTO: 33.6 PG (ref 27–33)
MCHC RBC AUTO-ENTMCNC: 32.8 G/DL (ref 33.7–35.3)
MCV RBC AUTO: 102.6 FL (ref 81.4–97.8)
MONOCYTES # BLD AUTO: 1.9 K/UL (ref 0–0.85)
MONOCYTES NFR BLD AUTO: 6.2 % (ref 0–13.4)
MORPHOLOGY BLD-IMP: NORMAL
NEUTROPHILS # BLD AUTO: 4.07 K/UL (ref 1.82–7.42)
NEUTROPHILS NFR BLD: 11.5 % (ref 44–72)
NEUTS BAND NFR BLD MANUAL: 1.8 % (ref 0–10)
NRBC # BLD AUTO: 4 K/UL
NRBC BLD-RTO: 13.1 /100 WBC
PHOSPHATE SERPL-MCNC: 4.4 MG/DL (ref 2.5–4.5)
PLATELET # BLD AUTO: 34 K/UL (ref 164–446)
PLATELET BLD QL SMEAR: NORMAL
PLATELETS.RETICULATED NFR BLD AUTO: 29.3 K/UL (ref 0.6–13.1)
POLYCHROMASIA BLD QL SMEAR: NORMAL
POTASSIUM SERPL-SCNC: 4.8 MMOL/L (ref 3.6–5.5)
PRODUCT TYPE UPROD: NORMAL
PROT SERPL-MCNC: 5.2 G/DL (ref 6–8.2)
RBC # BLD AUTO: 2.29 M/UL (ref 4.7–6.1)
RBC BLD AUTO: PRESENT
RH BLD: ABNORMAL
SMUDGE CELLS BLD QL SMEAR: NORMAL
SODIUM SERPL-SCNC: 132 MMOL/L (ref 135–145)
UNIT STATUS USTAT: NORMAL
URATE SERPL-MCNC: 3.7 MG/DL (ref 2.5–8.3)
WBC # BLD AUTO: 30.6 K/UL (ref 4.8–10.8)

## 2021-10-27 PROCEDURE — 700102 HCHG RX REV CODE 250 W/ 637 OVERRIDE(OP): Performed by: INTERNAL MEDICINE

## 2021-10-27 PROCEDURE — 97165 OT EVAL LOW COMPLEX 30 MIN: CPT

## 2021-10-27 PROCEDURE — 86900 BLOOD TYPING SEROLOGIC ABO: CPT

## 2021-10-27 PROCEDURE — 700102 HCHG RX REV CODE 250 W/ 637 OVERRIDE(OP): Performed by: STUDENT IN AN ORGANIZED HEALTH CARE EDUCATION/TRAINING PROGRAM

## 2021-10-27 PROCEDURE — A9270 NON-COVERED ITEM OR SERVICE: HCPCS | Performed by: NURSE PRACTITIONER

## 2021-10-27 PROCEDURE — 84550 ASSAY OF BLOOD/URIC ACID: CPT

## 2021-10-27 PROCEDURE — 86901 BLOOD TYPING SEROLOGIC RH(D): CPT

## 2021-10-27 PROCEDURE — 85007 BL SMEAR W/DIFF WBC COUNT: CPT

## 2021-10-27 PROCEDURE — 86850 RBC ANTIBODY SCREEN: CPT

## 2021-10-27 PROCEDURE — A9270 NON-COVERED ITEM OR SERVICE: HCPCS | Performed by: STUDENT IN AN ORGANIZED HEALTH CARE EDUCATION/TRAINING PROGRAM

## 2021-10-27 PROCEDURE — 700101 HCHG RX REV CODE 250: Performed by: INTERNAL MEDICINE

## 2021-10-27 PROCEDURE — 36415 COLL VENOUS BLD VENIPUNCTURE: CPT

## 2021-10-27 PROCEDURE — A9270 NON-COVERED ITEM OR SERVICE: HCPCS | Performed by: GENERAL PRACTICE

## 2021-10-27 PROCEDURE — 85018 HEMOGLOBIN: CPT

## 2021-10-27 PROCEDURE — 770004 HCHG ROOM/CARE - ONCOLOGY PRIVATE *

## 2021-10-27 PROCEDURE — 700111 HCHG RX REV CODE 636 W/ 250 OVERRIDE (IP): Mod: JG | Performed by: INTERNAL MEDICINE

## 2021-10-27 PROCEDURE — 700102 HCHG RX REV CODE 250 W/ 637 OVERRIDE(OP): Performed by: NURSE PRACTITIONER

## 2021-10-27 PROCEDURE — P9034 PLATELETS, PHERESIS: HCPCS

## 2021-10-27 PROCEDURE — 700102 HCHG RX REV CODE 250 W/ 637 OVERRIDE(OP): Performed by: GENERAL PRACTICE

## 2021-10-27 PROCEDURE — 80053 COMPREHEN METABOLIC PANEL: CPT

## 2021-10-27 PROCEDURE — 85027 COMPLETE CBC AUTOMATED: CPT

## 2021-10-27 PROCEDURE — A9270 NON-COVERED ITEM OR SERVICE: HCPCS | Performed by: INTERNAL MEDICINE

## 2021-10-27 PROCEDURE — 97161 PT EVAL LOW COMPLEX 20 MIN: CPT

## 2021-10-27 PROCEDURE — 36430 TRANSFUSION BLD/BLD COMPNT: CPT

## 2021-10-27 PROCEDURE — 30233R1 TRANSFUSION OF NONAUTOLOGOUS PLATELETS INTO PERIPHERAL VEIN, PERCUTANEOUS APPROACH: ICD-10-PCS | Performed by: STUDENT IN AN ORGANIZED HEALTH CARE EDUCATION/TRAINING PROGRAM

## 2021-10-27 PROCEDURE — 700105 HCHG RX REV CODE 258: Performed by: INTERNAL MEDICINE

## 2021-10-27 PROCEDURE — 85055 RETICULATED PLATELET ASSAY: CPT

## 2021-10-27 PROCEDURE — 84100 ASSAY OF PHOSPHORUS: CPT

## 2021-10-27 PROCEDURE — 86945 BLOOD PRODUCT/IRRADIATION: CPT

## 2021-10-27 PROCEDURE — 99233 SBSQ HOSP IP/OBS HIGH 50: CPT | Performed by: NURSE PRACTITIONER

## 2021-10-27 RX ORDER — FEXOFENADINE HCL 60 MG/1
60 TABLET, FILM COATED ORAL EVERY 12 HOURS
Status: DISCONTINUED | OUTPATIENT
Start: 2021-10-27 | End: 2021-10-27

## 2021-10-27 RX ORDER — DIPHENHYDRAMINE HCL 25 MG
25 TABLET ORAL EVERY 4 HOURS PRN
Status: DISCONTINUED | OUTPATIENT
Start: 2021-10-27 | End: 2021-11-01

## 2021-10-27 RX ORDER — ONDANSETRON 2 MG/ML
8 INJECTION INTRAMUSCULAR; INTRAVENOUS ONCE
Status: DISCONTINUED | OUTPATIENT
Start: 2021-10-27 | End: 2021-10-28

## 2021-10-27 RX ADMIN — GUAIFENESIN 600 MG: 600 TABLET, EXTENDED RELEASE ORAL at 06:18

## 2021-10-27 RX ADMIN — FERROUS SULFATE TAB 325 MG (65 MG ELEMENTAL FE) 325 MG: 325 (65 FE) TAB at 06:18

## 2021-10-27 RX ADMIN — ALLOPURINOL 300 MG: 300 TABLET ORAL at 06:18

## 2021-10-27 RX ADMIN — AZACITIDINE 148.5 MG: 100 INJECTION, POWDER, LYOPHILIZED, FOR SOLUTION SUBCUTANEOUS at 14:49

## 2021-10-27 RX ADMIN — FEXOFENADINE HYDROCHLORIDE 60 MG: 60 TABLET, FILM COATED ORAL at 10:19

## 2021-10-27 RX ADMIN — Medication 1 TABLET: at 06:19

## 2021-10-27 RX ADMIN — DOCUSATE SODIUM 50 MG AND SENNOSIDES 8.6 MG 2 TABLET: 8.6; 5 TABLET, FILM COATED ORAL at 17:43

## 2021-10-27 RX ADMIN — ACYCLOVIR 400 MG: 200 CAPSULE ORAL at 06:18

## 2021-10-27 RX ADMIN — ENALAPRIL MALEATE 10 MG: 10 TABLET ORAL at 06:18

## 2021-10-27 RX ADMIN — VORICONAZOLE 200 MG: 200 TABLET ORAL at 06:19

## 2021-10-27 RX ADMIN — FLECAINIDE ACETATE 50 MG: 50 TABLET ORAL at 17:45

## 2021-10-27 RX ADMIN — FLECAINIDE ACETATE 50 MG: 50 TABLET ORAL at 06:18

## 2021-10-27 RX ADMIN — SODIUM CHLORIDE: 9 INJECTION, SOLUTION INTRAVENOUS at 10:15

## 2021-10-27 RX ADMIN — VENETOCLAX 20 MG: 10 TABLET, FILM COATED ORAL at 17:46

## 2021-10-27 RX ADMIN — DIPHENHYDRAMINE HYDROCHLORIDE 25 MG: 25 TABLET ORAL at 00:48

## 2021-10-27 RX ADMIN — GUAIFENESIN 200 MG: 100 SOLUTION ORAL at 00:48

## 2021-10-27 RX ADMIN — VORICONAZOLE 200 MG: 200 TABLET ORAL at 17:41

## 2021-10-27 RX ADMIN — MICROFIBRILLAR COLLAGEN HEMOSTAT POWDER 1 EACH: POWDER at 21:40

## 2021-10-27 RX ADMIN — ACYCLOVIR 400 MG: 200 CAPSULE ORAL at 17:42

## 2021-10-27 RX ADMIN — SODIUM CHLORIDE: 9 INJECTION, SOLUTION INTRAVENOUS at 00:09

## 2021-10-27 RX ADMIN — GUAIFENESIN 600 MG: 600 TABLET, EXTENDED RELEASE ORAL at 17:43

## 2021-10-27 RX ADMIN — Medication 2000 UNITS: at 06:19

## 2021-10-27 ASSESSMENT — GAIT ASSESSMENTS
ASSISTIVE DEVICE: FRONT WHEEL WALKER
GAIT LEVEL OF ASSIST: SUPERVISED
DISTANCE (FEET): 250

## 2021-10-27 ASSESSMENT — ENCOUNTER SYMPTOMS
DIZZINESS: 0
FOCAL WEAKNESS: 0
PHOTOPHOBIA: 0
MYALGIAS: 0
SPUTUM PRODUCTION: 0
HEMOPTYSIS: 0
FEVER: 0
HEARTBURN: 0
DOUBLE VISION: 0
COUGH: 0
HEADACHES: 0
DIARRHEA: 0
COUGH: 1
BRUISES/BLEEDS EASILY: 1
SHORTNESS OF BREATH: 0
PALPITATIONS: 0
WEIGHT LOSS: 0
DEPRESSION: 0
BLURRED VISION: 0
ORTHOPNEA: 0
EYE DISCHARGE: 0
VOMITING: 0
CHILLS: 0
ABDOMINAL PAIN: 0
BRUISES/BLEEDS EASILY: 0
NAUSEA: 0

## 2021-10-27 ASSESSMENT — COGNITIVE AND FUNCTIONAL STATUS - GENERAL
DAILY ACTIVITIY SCORE: 24
SUGGESTED CMS G CODE MODIFIER DAILY ACTIVITY: CH
MOBILITY SCORE: 24
SUGGESTED CMS G CODE MODIFIER MOBILITY: CH

## 2021-10-27 ASSESSMENT — PAIN DESCRIPTION - PAIN TYPE: TYPE: ACUTE PAIN

## 2021-10-27 ASSESSMENT — FIBROSIS 4 INDEX: FIB4 SCORE: 28.88

## 2021-10-27 ASSESSMENT — ACTIVITIES OF DAILY LIVING (ADL): TOILETING: INDEPENDENT

## 2021-10-27 ASSESSMENT — LIFESTYLE VARIABLES: SUBSTANCE_ABUSE: 0

## 2021-10-27 NOTE — CARE PLAN
The patient is Watcher - Medium risk of patient condition declining or worsening    Shift Goals  Clinical Goals: Rest  Patient Goals: Rest    Progress made toward(s) clinical / shift goals:    Problem: Knowledge Deficit - Standard  Goal: Patient and family/care givers will demonstrate understanding of plan of care, disease process/condition, diagnostic tests and medications  Outcome: Progressing  Note: Pt was kept up to date on the plan of care. Had a rough night and was unable to sleep much. Running a low grade fever of 99.5-100.2F. New orders for robitussin and benadryl were placed. Will continue to monitor and keep Pt up to date on the plan of care. All questions and concerns have been addressed at this time.      Problem: Fall Risk  Goal: Patient will remain free from falls  Outcome: Progressing  Note: Pt has remained free from any falls during this shift. Pt got up to use the restroom a couple times throughout the night and remained steady with a front wheel walker.        Patient is not progressing towards the following goals:

## 2021-10-27 NOTE — PROGRESS NOTES
"Pharmacy Chemotherapy Verification    Patient Name: Alfonso Gaspar   Dx; AML  Protocol: AzaCITIDine + venetoclax       *Dosing Reference*  AzaCITIDine 75 mg/m2 IV over 10-40 minutes daily on Days 1-7  Venetoclax dose titration for voriconazole drug interaction              -10 mg PO on Day 1 followed by              -20 mg PO on Day 2 followed by               -50 mg PO on Day 3 followed by               -100 mg PO daily on Days 4-28  28 day cycle for 1 cycle  NCCN Guidelines for Acute Myeloid Leukemia.V.3.2020  Aron MLYES et al. Lancet Oncol. 2018;19(2):216-228  Aron MYLES et al. Blood. 2019;133(1):7-17    Allergies:  Pcn [penicillins]     /49   Pulse 97   Temp 36.4 °C (97.5 °F) (Oral)   Resp 20   Ht 1.778 m (5' 10\")   Wt 81.4 kg (179 lb 7.3 oz)   SpO2 96%   BMI 25.75 kg/m²  Body surface area is 2.01 meters squared.    Labs 10/28/21  ANC 2150     Hgb 7.7           Plt 49k  SCr 0.55          CrCl 100.8 mL/min   AST/ALT/AP = 72/27/115      Tbili = 0.68  Uric Acid = 4.4               **MD aware of labs, OK to proceed with treatment as outlined below.Transfusion parameters in place for Hgb <7 and platelets <50k.**    Drug Order   (Drug name, dose, route, IV Fluid & volume, frequency, number of doses) Cycle: 1 Day 3 of 7    Previous treatment: N/A     Medication = azaCITIDtine   Base Dose = 75 mg/m2   Calc Dose: Base Dose x 2.01 m2 = 150.75 mg  Final Dose = 150.75 mg  Route = IV  Fluid & Volume =  mL  Admin Duration = Over 10-40 minutes           <10% difference, OK to treat with final dose      By my signature below, I confirm this process was performed independently with the BSA and all final chemotherapy dosing calculations congruent. I have reviewed the above chemotherapy order and that my calculation of the final dose and BSA (when applicable) corroborate those calculations of the  pharmacist. Discrepancies of 5% or greater in the written dose have been addressed and documented " within the EPIC Progress notes.    Signature: Diane Leroy, PharmD, BCOP

## 2021-10-27 NOTE — PROGRESS NOTES
Chemotherapy Verification - PRIMARY RN      Height =177.8 cm   Weight = 81.4 kg  BSA = 1.98 m2       Medication: Vidaza  Dose: 75 mg / m2  Calculated Dose: 148.5 mg, order dose 148.5 mg.                              (In mg/m2, AUC, mg/kg)     Chemo up at 1449, secondary RN Christine Kruse second calculation       I confirm this process was performed independently with the BSA and all final chemotherapy dosing calculations congruent.  Any discrepancies of 10% or greater have been addressed with the chemotherapy pharmacist. The resolution of the discrepancy has been documented in the EPIC progress notes.

## 2021-10-27 NOTE — PROGRESS NOTES
[facility] Medicine Daily Progress Note    Date of Service    10/25/21    Chief Complaint  Alfonso Gaspar is a 76 y.o. male admitted 10/18/2021 with leukocytosis.     Hospital Course  This is a 76 year old male with PMHx of atrial fibrillation (s/p cardioversion, off AC, Dr. Low), hypertension, hyperlipidemia, and gout who was admitted on 10/18/2021 due to abnormal labs. Patient was seen by cardiology, Dr. Low, patient had a cardioversion in September 2021, patient returned to [facility], patient taken off of Eliquis due to recurrent nosebleeds.  Patient is currently on flecainide.  Patient had 4-5 ER visits due to this recurrent epistaxis, requiring packing, cauterization, and surgical procedure performed by ENT.  Patient is currently off of any anticoagulation.    Patient was sent in by PCP due to findings of leukocytosis, anemia, and thrombocytopenia on repeat labs. Labs on admission noted hemoglobin of 7.4, patient was transfused 1 unit, hemoglobin remained at 7.3.  We will continue with serial hemoglobins and transfuse if less than 7. Oncology was consulted, they do recommend inpatient bone marrow biopsy, this was performed 10/19, by my colleague, Dr. Arthur Caruso.  Oncology following.    Interval Problem Update  10/26:  Initiated on vidaza and venetoclax today.  He denies acute pain, nausea, or vomiting.  His only complaint is nasal congestion which has been chronic since his recent episode epistaxis.  VSS.     Consultants/Specialty  oncology    Code Status  Full Code    Disposition  Patient is not medically cleared.   Anticipate discharge to to home with close outpatient follow-up.  I have placed the appropriate orders for post-discharge needs.    Review of Systems  Review of Systems   Constitutional: Positive for malaise/fatigue. Negative for chills, diaphoresis and fever.   HENT: Positive for congestion and sinus pain.         Post nasal drip; hoarseness   Eyes: Negative for blurred vision and double vision.    Respiratory: Positive for cough. Negative for shortness of breath and wheezing.    Cardiovascular: Negative for chest pain, palpitations and PND.   Gastrointestinal: Negative for abdominal pain, blood in stool, diarrhea, heartburn, melena, nausea and vomiting.   Genitourinary: Negative for dysuria.   Musculoskeletal: Negative for myalgias.   Skin: Negative for rash.   Neurological: Negative for dizziness and focal weakness.   Endo/Heme/Allergies: Does not bruise/bleed easily.   Psychiatric/Behavioral: Negative for depression.   All other systems reviewed and are negative.       Physical Exam  Temp:  [36.5 °C (97.7 °F)-37 °C (98.6 °F)] 36.5 °C (97.7 °F)  Pulse:  [84-91] 90  Resp:  [18-20] 20  BP: (103-114)/(57-60) 111/60  SpO2:  [91 %-96 %] 96 %    Physical Exam  Vitals and nursing note reviewed.   Constitutional:       General: He is not in acute distress.     Appearance: Normal appearance. He is not ill-appearing.      Interventions: Nasal cannula in place.   HENT:      Head: Normocephalic and atraumatic.      Nose: Congestion present.      Mouth/Throat:      Mouth: Mucous membranes are dry.      Pharynx: No oropharyngeal exudate.   Eyes:      General:         Right eye: No discharge.         Left eye: No discharge.      Conjunctiva/sclera: Conjunctivae normal.      Pupils: Pupils are equal, round, and reactive to light.   Cardiovascular:      Rate and Rhythm: Normal rate and regular rhythm.      Pulses: Normal pulses.      Heart sounds: Normal heart sounds. No murmur heard.   No friction rub.   Pulmonary:      Effort: Pulmonary effort is normal. No respiratory distress.      Breath sounds: Normal breath sounds.   Abdominal:      General: Bowel sounds are normal. There is no distension.      Palpations: Abdomen is soft.      Tenderness: There is no abdominal tenderness. There is no guarding.   Musculoskeletal:         General: Normal range of motion.      Cervical back: Neck supple. No rigidity or tenderness.    Skin:     General: Skin is warm and dry.      Coloration: Skin is not jaundiced or pale.   Neurological:      General: No focal deficit present.      Mental Status: He is alert and oriented to person, place, and time.   Psychiatric:         Attention and Perception: Attention normal.         Mood and Affect: Mood normal.         Fluids    Intake/Output Summary (Last 24 hours) at 10/26/2021 2128  Last data filed at 10/26/2021 1800  Gross per 24 hour   Intake 1565 ml   Output --   Net 1565 ml       Laboratory  Recent Labs     10/24/21  0038 10/25/21  0310 10/26/21  0005   WBC 44.6* 49.4* 47.0*   RBC 2.35* 2.28* 2.31*   HEMOGLOBIN 7.7* 7.6* 7.6*   HEMATOCRIT 23.9* 22.9* 23.5*   .7* 100.4* 101.7*   MCH 32.8 33.3* 32.9   MCHC 32.2* 33.2* 32.3*   RDW 89.4* 83.3* 83.7*   PLATELETCT 42* 33* 34*     Recent Labs     10/25/21  0310 10/26/21  0005 10/26/21  1345   SODIUM 132* 133* 133*   POTASSIUM 4.1 4.2 4.0   CHLORIDE 102 103 102   CO2 21 22 21   GLUCOSE 84 87 127*   BUN 16 18 20   CREATININE 0.46* 0.48* 0.51   CALCIUM 7.8* 7.9* 7.9*                   Imaging  DX-CHEST-PORTABLE (1 VIEW)   Final Result      Right PICC line placement with the tip projecting over the proximal right atrium.      IR-PICC LINE PLACEMENT W/ GUIDANCE > AGE 5   Final Result                  Ultrasound-guided PICC placement performed by qualified nursing staff as    above.               Assessment/Plan  * AML (acute myeloblastic leukemia) (HCC)  Assessment & Plan  Based on initial interpretation of BMB  10/21 PICC  10/26 initiated on venetoclax and vidaza for 7 day regimen.  Prophylactic antifungal and antiviral initiated.  Oncology following.  Follow labs closely.     Post-nasal drip  Assessment & Plan  W hoarseness and dry cough -- present since last COVID infection; likely post-infectous  Sudafed as needed and follow blood pressure  Humidified O2  Ocean mist nasal spray as needed  Antihistamine PRN  10/24: Repeat COVID PCR negative. CXR  clear.  10/25 Mucinex    Hyponatremia  Assessment & Plan  Stable; monitor    Elevated LDH  Assessment & Plan  LDH baseline 347  Monitor following induction    Thrombocytopenia (HCC)- (present on admission)  Assessment & Plan  Not currently bleeding, no epistaxis  Tranfuse platelets prn  Likely secondary to marrow dysfunction  Daily CBC    Leukocytosis- (present on admission)  Assessment & Plan  Likely secondary to Leukemia  S/p bone marrow biopsy 10/19/2021 by Dr. Arthur Caruso  Oncology consulted, Dr. ALEX Anguiano   Daily CBC w diff      Atrial fibrillation (HCC)- (present on admission)  Assessment & Plan  Patient follows up with Dr. Low  Currently had a cardioversion in September 2021  As per cardiology no longer on anticoagulation  Patient to continue with flecainide  10/21: EKG - SR    Anemia- (present on admission)  Assessment & Plan  S/p bone marrow biopsy 10/19/2021 by Dr. Arthur Caruso  Oncology consulted, Dr. ALEX Anguiano   Required 1 unit PRBC on 10/18/2021  Monitor with serial hemoglobins, transfuse if hemoglobin is less than 7  10/21 Transfused; daily CBC w diff  Standing transfusion order placed by Onc    Essential hypertension, benign- (present on admission)  Assessment & Plan  Continue Home Medications  Labetalol ivp prn with parameters       VTE prophylaxis: SCDs/TEDs and pharmacologic prophylaxis contraindicated due to low platelets    I have performed a physical exam and reviewed and updated ROS and Plan today (10/26/2021). In review of yesterday's note (10/25/2021), there are no changes except as documented above.

## 2021-10-27 NOTE — THERAPY
"Occupational Therapy   Initial Evaluation     Patient Name: Alfonso Gaspar  Age:  76 y.o., Sex:  male  Medical Record #: [id number]  Today's Date: 10/27/2021          Assessment  Patient is 76 y.o. male admitted for abnormal labs possible leukemia, anemia. Pt normally independent with all mobility and ADLs had been using a SPC lately due to fatigue living in a 2 story home with spouse who is able to assist as needed. Pt able to complete all mobility and ADLs with supervision and a FWW, no LOB, steady. Pt presents at his functional baseline, educated need to continue to mobilize with nursing staff and also to express concerns about decrease in status if that occurs. WIll complete order at this time, please re-consult if status changes. Patient will not be actively followed for occupational therapy services at this time, however may be seen if requested by physician for 1 more visit within 30 days to address any discharge or equipment needs.      Plan    Recommend Occupational Therapy for Evaluation only.    DC Equipment Recommendations: (P) None  Discharge Recommendations: (P) Anticipate that the patient will have no further occupational therapy needs after discharge from the hospital     Subjective    \"I was just talking to my nurse about an hour ago about this, thanks for coming\"     Objective       10/27/21 1020   Prior Living Situation   Prior Services Home-Independent   Housing / Facility 2 Story House   Steps Into Home 2   Steps In Home   (FOS)   Elevator No   Bathroom Set up Walk In Shower;Shower Chair   Equipment Owned Front-Wheel Walker;Single Point Cane;Tub / Shower Seat   Lives with - Patient's Self Care Capacity Spouse   Comments Spouse able to assist as needed   Prior Level of ADL Function   Self Feeding Independent   Grooming / Hygiene Independent   Bathing Independent   Dressing Independent   Toileting Independent   Prior Level of IADL Function   Medication Management Independent   Laundry Independent "   Kitchen Mobility Independent   Finances Independent   Home Management Independent   Shopping Independent   Prior Level Of Mobility Independent With Device in Community   Driving / Transportation Relatives / Others Provide Transportation   Occupation (Pre-Hospital Vocational) Retired Due To Age   History of Falls   History of Falls No   Pain 0 - 10 Group   Therapist Pain Assessment Post Activity Pain Same as Prior to Activity;Nurse Notified   Cognition    Cognition / Consciousness WDL   Level of Consciousness Alert   Comments Very pleasant, cooperative, receptive to therapy   Active ROM Upper Body   Active ROM Upper Body  WDL   Dominant Hand Right   Strength Upper Body   Upper Body Strength  WDL   Sensation Upper Body   Upper Extremity Sensation  WDL   Upper Body Muscle Tone   Upper Body Muscle Tone  WDL   Neurological Concerns   Neurological Concerns No   Coordination Upper Body   Coordination WDL   Balance Assessment   Sitting Balance (Static) Good   Sitting Balance (Dynamic) Good   Standing Balance (Static) Good   Standing Balance (Dynamic) Good   Weight Shift Sitting Good   Weight Shift Standing Good   Comments w/ FWW   Bed Mobility    Supine to Sit Supervised   Scooting Supervised   Rolling Supervised   ADL Assessment   Upper Body Dressing Supervision   Lower Body Dressing Supervision   Toileting   (NT-refused need)   How much help from another person does the patient currently need...   Putting on and taking off regular lower body clothing? 4   Bathing (including washing, rinsing, and drying)? 4   Toileting, which includes using a toilet, bedpan, or urinal? 4   Putting on and taking off regular upper body clothing? 4   Taking care of personal grooming such as brushing teeth? 4   Eating meals? 4   6 Clicks Daily Activity Score 24   Functional Mobility   Sit to Stand Supervised   Bed, Chair, Wheelchair Transfer Supervised   Toilet Transfers Refused   Transfer Method Stand Step   Mobility bed mobility, hallway  mobility, up to chair   Comments w/ FWW   Visual Perception   Visual Perception  WDL   Activity Tolerance   Sitting in Chair left seated in chair   Sitting Edge of Bed 2 min   Standing 10 min   Education Group   Education Provided Role of Occupational Therapist   Role of Occupational Therapist Patient Response Patient;Acceptance;Explanation   Problem List   Problem List None   Interdisciplinary Plan of Care Collaboration   IDT Collaboration with  Nursing;Physical Therapist   Patient Position at End of Therapy Seated;Call Light within Reach;Tray Table within Reach;Phone within Reach   Collaboration Comments RN updated

## 2021-10-27 NOTE — THERAPY
Physical Therapy   Initial Evaluation     Patient Name: Alfonso Gaspar  Age:  76 y.o., Sex:  male  Medical Record #: [id number]  Today's Date: 10/27/2021          Assessment  Patient is 76 y.o. male sent to ED from his MD due to low H&H.  S/p transfusion.  Hx of COVID, gout, splenectomy and bilateral TKA's.  Pending bone marrow bx results.  He lives in a two story house w/ his wife.  He avoids the second story.  He ambulates w/ a fww.  Today, he is rec'd alert, in bed, agreeable to work w/ PT.  He is able to move to the edge of the bed w/o assist.  He is able to stand and ambulate w/ a fww w/o loss of balance and w/o need of physical assist.  Declines the need to perform stairs.  No acute PT needs.  Recommend oob/amb prn w/ fww and nsg spv. PT for d/c needs only.    Plan    Recommend Physical Therapy for Evaluation only  DC Equipment Recommendations: None  Discharge Recommendations: Recommend home health for continued physical therapy services          Objective       10/27/21 0953   Prior Living Situation   Housing / Facility 2 Story House   Steps Into Home 2   Steps In Home   (avoids second story)   Equipment Owned Front-Wheel Walker;Single Point Cane   Lives with - Patient's Self Care Capacity Spouse   Prior Level of Functional Mobility   Bed Mobility Independent   Transfer Status Independent   Ambulation Independent   Assistive Devices Used Front-Wheel Walker   Stairs Independent   Balance Assessment   Sitting Balance (Static) Fair +   Sitting Balance (Dynamic) Fair +   Standing Balance (Static) Fair +   Standing Balance (Dynamic) Fair +   Weight Shift Sitting Good   Weight Shift Standing Good   Comments w/ fww   Gait Analysis   Gait Level Of Assist Supervised   Assistive Device Front Wheel Walker   Distance (Feet) 250   Bed Mobility    Supine to Sit Supervised   Functional Mobility   Sit to Stand Supervised   Bed, Chair, Wheelchair Transfer Supervised   Anticipated Discharge Equipment and Recommendations   DC  Equipment Recommendations None   Discharge Recommendations Recommend home health for continued physical therapy services

## 2021-10-27 NOTE — PROGRESS NOTES
Oncology/Hematology Progress Note               Author: Chau Marquis M.D. Date & Time created: 10/27/2021  12:27 PM     CC: Pancytopenia/leukemia  Interval History:  Patient is looking very well today, sitting in a chair, he does have mild fatigue, otherwise no shortness of breath, chest pain, he is tolerating treatment fairly well with no inconvenience.  No need for blood transfusions today.    PMHx, PSurgHx, SocHx, FAMHx: Reviewed and unchanged    Review of Systems:  Review of Systems   Constitutional: Positive for malaise/fatigue. Negative for chills, fever and weight loss.   HENT: Negative for congestion, ear discharge, ear pain, hearing loss, nosebleeds and tinnitus.    Eyes: Negative for blurred vision, double vision and photophobia.   Respiratory: Negative for cough, hemoptysis, sputum production and shortness of breath.    Cardiovascular: Negative for chest pain, palpitations and orthopnea.   Gastrointestinal: Negative for abdominal pain, heartburn, nausea and vomiting.   Genitourinary: Negative for dysuria and urgency.   Skin: Negative for rash.   Neurological: Negative for dizziness and headaches.   Endo/Heme/Allergies: Negative for environmental allergies. Does not bruise/bleed easily.   Psychiatric/Behavioral: Negative for depression, substance abuse and suicidal ideas.       Physical Exam:  Physical Exam  Constitutional:       Appearance: Normal appearance.   Eyes:      Extraocular Movements: Extraocular movements intact.      Conjunctiva/sclera: Conjunctivae normal.   Cardiovascular:      Rate and Rhythm: Normal rate and regular rhythm.      Pulses: Normal pulses.      Heart sounds: Normal heart sounds.   Pulmonary:      Effort: Pulmonary effort is normal.      Breath sounds: Normal breath sounds.   Musculoskeletal:      Cervical back: Normal range of motion.   Neurological:      General: No focal deficit present.      Mental Status: He is alert and oriented to person, place, and time.   Psychiatric:          Mood and Affect: Mood normal.         Labs:          Recent Labs     10/26/21  0005 10/26/21  1345 10/27/21  0005   SODIUM 133* 133* 132*   POTASSIUM 4.2 4.0 4.8   CHLORIDE 103 102 102   CO2 22 21 20   BUN 18 20 22   CREATININE 0.48* 0.51 0.61   PHOSPHORUS  --  3.3 4.4   CALCIUM 7.9* 7.9* 7.5*     Recent Labs     10/26/21  0005 10/26/21  1345 10/27/21  0005   ALTSGPT  --  32 31   ASTSGOT  --  58* 71*   ALKPHOSPHAT  --  125* 136*   TBILIRUBIN  --  0.7 0.6   GLUCOSE 87 127* 102*     Recent Labs     10/25/21  0310 10/26/21  0005 10/27/21  0005   RBC 2.28* 2.31* 2.29*   HEMOGLOBIN 7.6* 7.6* 7.7*   HEMATOCRIT 22.9* 23.5* 23.5*   PLATELETCT 33* 34* 34*     Recent Labs     10/25/21  0310 10/26/21  0005 10/26/21  1345 10/27/21  0005   WBC 49.4* 47.0*  --  30.6*   NEUTSPOLYS 9.10* 7.50*  --  11.50*   LYMPHOCYTES 49.50* 60.60*  --  37.20   MONOCYTES 8.10 31.70*  --  6.20   EOSINOPHILS 0.00 0.00  --  0.00   BASOPHILS 0.00 0.20  --  0.00   ASTSGOT  --   --  58* 71*   ALTSGPT  --   --  32 31   ALKPHOSPHAT  --   --  125* 136*   TBILIRUBIN  --   --  0.7 0.6     Recent Labs     10/26/21  0005 10/26/21  1345 10/27/21  0005   SODIUM 133* 133* 132*   POTASSIUM 4.2 4.0 4.8   CHLORIDE 103 102 102   CO2 22 21 20   GLUCOSE 87 127* 102*   BUN 18 20 22   CREATININE 0.48* 0.51 0.61   CALCIUM 7.9* 7.9* 7.5*     Hemodynamics:  Temp (24hrs), Av.6 °C (97.8 °F), Min:36.4 °C (97.5 °F), Max:36.7 °C (98.1 °F)  Temperature: 36.4 °C (97.5 °F)  Pulse  Av.7  Min: 67  Max: 103   Blood Pressure : 102/49     Respiratory:    Respiration: 20, Pulse Oximetry: 96 %     Given By:: Mouthpiece, Work Of Breathing / Effort: Mild  RUL Breath Sounds: Clear, RML Breath Sounds: Diminished, RLL Breath Sounds: Diminished, JOE Breath Sounds: Clear, LLL Breath Sounds: Diminished  Fluids:    Intake/Output Summary (Last 24 hours) at 10/23/2021 1150  Last data filed at 10/23/2021 0900  Gross per 24 hour   Intake 1310 ml   Output --   Net 1310 ml     Weight:  81.4 kg (179 lb 7.3 oz)  GI/Nutrition:  Orders Placed This Encounter   Procedures   • Diet Order Diet: Cardiac     Standing Status:   Standing     Number of Occurrences:   1     Order Specific Question:   Diet:     Answer:   Cardiac [6]     Medical Decision Making, by Problem:  Active Hospital Problems    Diagnosis    • *AML (acute myeloblastic leukemia) (HCC) [C92.00]    • Post-nasal drip [R09.82]    • Elevated LDH [R74.02]    • Hyponatremia [E87.1]    • Leukocytosis [D72.829]    • Thrombocytopenia (HCC) [D69.6]    • Atrial fibrillation (HCC) [I48.91]    • Anemia [D64.9]    • Essential hypertension, benign [I10]      Procedures    PICC line right arm 10/22/2021    Impression  1.  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  Negative mutation status  (IDH1/2, FLT3, NPM 1 and CEBPA). FISH negative.  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  Leukocytosis/anemia/thrombocytopenia all secondary to #1  -Transfuse if hemoglobin less than 7 g/dL or platelets less than 10,000,  irradiated, CMV negative blood products    3.  History of atrial fibrillation post cardioversion September 2021 on flecainide    4.  History of hypertension    5.  Covid infection in July 2021 with chronic sinus symptoms  -10/24 repeated Covid test negative by PCR     6.  Tumor lysis prophylaxis  -On allopurinol 300 mg daily    7.  Antibiotic prophylaxis with acyclovir and voriconazole    Plan  -Patient is tolerating treatment fairly well, day 2 today.  -Continue checking daily labs including CBC, CMP, magnesium and phosphorus.  Daily uric acid, continue on allopurinol  -Transfuse with parameters as stated above  -Elevation of liver enzymes, continue following  -Planning to keep patient in hospital at least for his first cycle of  therapy    high complexity/complex decision making      Quality-Core Measures

## 2021-10-28 LAB
ALBUMIN SERPL BCP-MCNC: 2.5 G/DL (ref 3.2–4.9)
ALBUMIN/GLOB SERPL: 1 G/DL
ALP SERPL-CCNC: 115 U/L (ref 30–99)
ALT SERPL-CCNC: 27 U/L (ref 2–50)
ANION GAP SERPL CALC-SCNC: 9 MMOL/L (ref 7–16)
ANISOCYTOSIS BLD QL SMEAR: ABNORMAL
AST SERPL-CCNC: 72 U/L (ref 12–45)
BARCODED ABORH UBTYP: 5100
BARCODED ABORH UBTYP: 6200
BARCODED PRD CODE UBPRD: NORMAL
BARCODED PRD CODE UBPRD: NORMAL
BARCODED UNIT NUM UBUNT: NORMAL
BARCODED UNIT NUM UBUNT: NORMAL
BASOPHILS # BLD AUTO: 1.7 % (ref 0–1.8)
BASOPHILS # BLD: 0.13 K/UL (ref 0–0.12)
BILIRUB SERPL-MCNC: 0.8 MG/DL (ref 0.1–1.5)
BLASTS NFR BLD MANUAL: 12.9 %
BUN SERPL-MCNC: 22 MG/DL (ref 8–22)
BURR CELLS BLD QL SMEAR: NORMAL
CALCIUM SERPL-MCNC: 7.4 MG/DL (ref 8.5–10.5)
CHLORIDE SERPL-SCNC: 106 MMOL/L (ref 96–112)
CO2 SERPL-SCNC: 20 MMOL/L (ref 20–33)
COMPONENT R 8504R: NORMAL
COMPONENT R 8504R: NORMAL
CREAT SERPL-MCNC: 0.55 MG/DL (ref 0.5–1.4)
EOSINOPHIL # BLD AUTO: 0 K/UL (ref 0–0.51)
EOSINOPHIL NFR BLD: 0 % (ref 0–6.9)
ERYTHROCYTE [DISTWIDTH] IN BLOOD BY AUTOMATED COUNT: 71.7 FL (ref 35.9–50)
GLOBULIN SER CALC-MCNC: 2.4 G/DL (ref 1.9–3.5)
GLUCOSE SERPL-MCNC: 85 MG/DL (ref 65–99)
HCT VFR BLD AUTO: 23.3 % (ref 42–52)
HGB BLD-MCNC: 7.7 G/DL (ref 14–18)
LYMPHOCYTES # BLD AUTO: 3.7 K/UL (ref 1–4.8)
LYMPHOCYTES NFR BLD: 47.4 % (ref 22–41)
MACROCYTES BLD QL SMEAR: ABNORMAL
MAGNESIUM SERPL-MCNC: 1.9 MG/DL (ref 1.5–2.5)
MANUAL DIFF BLD: NORMAL
MCH RBC QN AUTO: 33 PG (ref 27–33)
MCHC RBC AUTO-ENTMCNC: 33 G/DL (ref 33.7–35.3)
MCV RBC AUTO: 100 FL (ref 81.4–97.8)
METAMYELOCYTES NFR BLD MANUAL: 0.9 %
MICROCYTES BLD QL SMEAR: ABNORMAL
MONOCYTES # BLD AUTO: 0.67 K/UL (ref 0–0.85)
MONOCYTES NFR BLD AUTO: 8.6 % (ref 0–13.4)
MORPHOLOGY BLD-IMP: NORMAL
MYELOCYTES NFR BLD MANUAL: 0.9 %
NEUTROPHILS # BLD AUTO: 2.15 K/UL (ref 1.82–7.42)
NEUTROPHILS NFR BLD: 27.6 % (ref 44–72)
NRBC # BLD AUTO: 1.31 K/UL
NRBC BLD-RTO: 16.8 /100 WBC
OVALOCYTES BLD QL SMEAR: NORMAL
PHOSPHATE SERPL-MCNC: 3.9 MG/DL (ref 2.5–4.5)
PLATELET # BLD AUTO: 49 K/UL (ref 164–446)
PLATELET BLD QL SMEAR: NORMAL
PLATELETS.RETICULATED NFR BLD AUTO: 11.5 K/UL (ref 0.6–13.1)
PMV BLD AUTO: 12 FL (ref 9–12.9)
POIKILOCYTOSIS BLD QL SMEAR: NORMAL
POTASSIUM SERPL-SCNC: 4.3 MMOL/L (ref 3.6–5.5)
PRODUCT TYPE UPROD: NORMAL
PRODUCT TYPE UPROD: NORMAL
PROT SERPL-MCNC: 4.9 G/DL (ref 6–8.2)
RBC # BLD AUTO: 2.33 M/UL (ref 4.7–6.1)
RBC BLD AUTO: PRESENT
SMUDGE CELLS BLD QL SMEAR: NORMAL
SODIUM SERPL-SCNC: 135 MMOL/L (ref 135–145)
UNIT STATUS USTAT: NORMAL
UNIT STATUS USTAT: NORMAL
URATE SERPL-MCNC: 4.4 MG/DL (ref 2.5–8.3)
WBC # BLD AUTO: 7.8 K/UL (ref 4.8–10.8)

## 2021-10-28 PROCEDURE — 80053 COMPREHEN METABOLIC PANEL: CPT

## 2021-10-28 PROCEDURE — A9270 NON-COVERED ITEM OR SERVICE: HCPCS | Performed by: GENERAL PRACTICE

## 2021-10-28 PROCEDURE — 85055 RETICULATED PLATELET ASSAY: CPT

## 2021-10-28 PROCEDURE — 700102 HCHG RX REV CODE 250 W/ 637 OVERRIDE(OP): Performed by: NURSE PRACTITIONER

## 2021-10-28 PROCEDURE — 99233 SBSQ HOSP IP/OBS HIGH 50: CPT | Performed by: NURSE PRACTITIONER

## 2021-10-28 PROCEDURE — 85007 BL SMEAR W/DIFF WBC COUNT: CPT

## 2021-10-28 PROCEDURE — 700102 HCHG RX REV CODE 250 W/ 637 OVERRIDE(OP): Performed by: STUDENT IN AN ORGANIZED HEALTH CARE EDUCATION/TRAINING PROGRAM

## 2021-10-28 PROCEDURE — 86923 COMPATIBILITY TEST ELECTRIC: CPT

## 2021-10-28 PROCEDURE — A9270 NON-COVERED ITEM OR SERVICE: HCPCS | Performed by: STUDENT IN AN ORGANIZED HEALTH CARE EDUCATION/TRAINING PROGRAM

## 2021-10-28 PROCEDURE — 85027 COMPLETE CBC AUTOMATED: CPT

## 2021-10-28 PROCEDURE — 700102 HCHG RX REV CODE 250 W/ 637 OVERRIDE(OP): Performed by: INTERNAL MEDICINE

## 2021-10-28 PROCEDURE — 84100 ASSAY OF PHOSPHORUS: CPT

## 2021-10-28 PROCEDURE — 700102 HCHG RX REV CODE 250 W/ 637 OVERRIDE(OP): Performed by: GENERAL PRACTICE

## 2021-10-28 PROCEDURE — 36430 TRANSFUSION BLD/BLD COMPNT: CPT

## 2021-10-28 PROCEDURE — 86945 BLOOD PRODUCT/IRRADIATION: CPT

## 2021-10-28 PROCEDURE — 83735 ASSAY OF MAGNESIUM: CPT

## 2021-10-28 PROCEDURE — P9016 RBC LEUKOCYTES REDUCED: HCPCS

## 2021-10-28 PROCEDURE — 84550 ASSAY OF BLOOD/URIC ACID: CPT

## 2021-10-28 PROCEDURE — 770004 HCHG ROOM/CARE - ONCOLOGY PRIVATE *

## 2021-10-28 PROCEDURE — A9270 NON-COVERED ITEM OR SERVICE: HCPCS | Performed by: NURSE PRACTITIONER

## 2021-10-28 PROCEDURE — 700105 HCHG RX REV CODE 258: Performed by: INTERNAL MEDICINE

## 2021-10-28 PROCEDURE — A9270 NON-COVERED ITEM OR SERVICE: HCPCS | Performed by: INTERNAL MEDICINE

## 2021-10-28 PROCEDURE — 700111 HCHG RX REV CODE 636 W/ 250 OVERRIDE (IP): Mod: JG | Performed by: INTERNAL MEDICINE

## 2021-10-28 RX ORDER — FEXOFENADINE HCL 60 MG/1
60 TABLET, FILM COATED ORAL DAILY
Status: DISCONTINUED | OUTPATIENT
Start: 2021-10-28 | End: 2021-11-05 | Stop reason: HOSPADM

## 2021-10-28 RX ORDER — ALBUTEROL SULFATE 90 UG/1
2 AEROSOL, METERED RESPIRATORY (INHALATION)
Status: DISCONTINUED | OUTPATIENT
Start: 2021-10-28 | End: 2021-11-05 | Stop reason: HOSPADM

## 2021-10-28 RX ORDER — ONDANSETRON 2 MG/ML
8 INJECTION INTRAMUSCULAR; INTRAVENOUS ONCE
Status: COMPLETED | OUTPATIENT
Start: 2021-10-28 | End: 2021-10-28

## 2021-10-28 RX ADMIN — ONDANSETRON 8 MG: 2 INJECTION INTRAMUSCULAR; INTRAVENOUS at 13:38

## 2021-10-28 RX ADMIN — GUAIFENESIN 200 MG: 100 SOLUTION ORAL at 02:19

## 2021-10-28 RX ADMIN — ALBUTEROL SULFATE 2 PUFF: 90 AEROSOL, METERED RESPIRATORY (INHALATION) at 23:26

## 2021-10-28 RX ADMIN — Medication 2000 UNITS: at 05:27

## 2021-10-28 RX ADMIN — VORICONAZOLE 200 MG: 200 TABLET ORAL at 05:27

## 2021-10-28 RX ADMIN — AZACITIDINE 150.8 MG: 100 INJECTION, POWDER, LYOPHILIZED, FOR SOLUTION INTRAVENOUS; SUBCUTANEOUS at 14:20

## 2021-10-28 RX ADMIN — ACYCLOVIR 400 MG: 200 CAPSULE ORAL at 05:27

## 2021-10-28 RX ADMIN — TRAZODONE HYDROCHLORIDE 50 MG: 100 TABLET ORAL at 02:20

## 2021-10-28 RX ADMIN — DOCUSATE SODIUM 50 MG AND SENNOSIDES 8.6 MG 2 TABLET: 8.6; 5 TABLET, FILM COATED ORAL at 17:08

## 2021-10-28 RX ADMIN — GUAIFENESIN 600 MG: 600 TABLET, EXTENDED RELEASE ORAL at 17:07

## 2021-10-28 RX ADMIN — VORICONAZOLE 200 MG: 200 TABLET ORAL at 17:07

## 2021-10-28 RX ADMIN — FLECAINIDE ACETATE 50 MG: 50 TABLET ORAL at 05:27

## 2021-10-28 RX ADMIN — ALBUTEROL SULFATE 2 PUFF: 90 AEROSOL, METERED RESPIRATORY (INHALATION) at 13:46

## 2021-10-28 RX ADMIN — ALBUTEROL SULFATE 2 PUFF: 90 AEROSOL, METERED RESPIRATORY (INHALATION) at 17:08

## 2021-10-28 RX ADMIN — VENETOCLAX 50 MG: 50 TABLET, FILM COATED ORAL at 15:27

## 2021-10-28 RX ADMIN — GUAIFENESIN 600 MG: 600 TABLET, EXTENDED RELEASE ORAL at 05:27

## 2021-10-28 RX ADMIN — ACYCLOVIR 400 MG: 200 CAPSULE ORAL at 17:07

## 2021-10-28 RX ADMIN — DIPHENHYDRAMINE HYDROCHLORIDE 25 MG: 25 TABLET ORAL at 02:20

## 2021-10-28 RX ADMIN — DIPHENHYDRAMINE HYDROCHLORIDE 25 MG: 25 TABLET ORAL at 23:37

## 2021-10-28 RX ADMIN — FERROUS SULFATE TAB 325 MG (65 MG ELEMENTAL FE) 325 MG: 325 (65 FE) TAB at 05:27

## 2021-10-28 RX ADMIN — FLECAINIDE ACETATE 50 MG: 50 TABLET ORAL at 17:07

## 2021-10-28 RX ADMIN — ALLOPURINOL 300 MG: 300 TABLET ORAL at 05:27

## 2021-10-28 RX ADMIN — TRAZODONE HYDROCHLORIDE 50 MG: 100 TABLET ORAL at 23:37

## 2021-10-28 RX ADMIN — Medication 1 TABLET: at 05:27

## 2021-10-28 RX ADMIN — FEXOFENADINE HCL 60 MG: 60 TABLET, FILM COATED ORAL at 11:30

## 2021-10-28 RX ADMIN — ENALAPRIL MALEATE 10 MG: 10 TABLET ORAL at 05:27

## 2021-10-28 RX ADMIN — LORATADINE 10 MG: 10 TABLET ORAL at 02:20

## 2021-10-28 ASSESSMENT — ENCOUNTER SYMPTOMS
BRUISES/BLEEDS EASILY: 1
HEADACHES: 0
VOMITING: 0
SORE THROAT: 0
FEVER: 0
BLURRED VISION: 0
CONSTIPATION: 0
COUGH: 1
DIZZINESS: 0
MYALGIAS: 0
DIARRHEA: 0
SPUTUM PRODUCTION: 0
PHOTOPHOBIA: 0
CHILLS: 0
NAUSEA: 0
COUGH: 0
HEARTBURN: 0
HEMOPTYSIS: 0
ABDOMINAL PAIN: 0
BRUISES/BLEEDS EASILY: 0
ORTHOPNEA: 0
FOCAL WEAKNESS: 0
PALPITATIONS: 0
DEPRESSION: 0
SHORTNESS OF BREATH: 0
DOUBLE VISION: 0
WEIGHT LOSS: 0

## 2021-10-28 ASSESSMENT — FIBROSIS 4 INDEX: FIB4 SCORE: 21.77

## 2021-10-28 ASSESSMENT — PAIN DESCRIPTION - PAIN TYPE
TYPE: ACUTE PAIN
TYPE: ACUTE PAIN

## 2021-10-28 ASSESSMENT — LIFESTYLE VARIABLES: SUBSTANCE_ABUSE: 0

## 2021-10-28 NOTE — PROGRESS NOTES
Chemotherapy Verification - SECONDARY RN  Cycle 1 day 2       Height = 177.8 cm  Weight = 81.4 kg  BSA = 2.01 m2       Medication: vidaza  Dose: 75 mg/m2  Calculated Dose: 150.75 mg ordered=  148.5 mg                            (In mg/m2, AUC, mg/kg)     I confirm that this process was performed independently.

## 2021-10-28 NOTE — ASSESSMENT & PLAN NOTE
Severe bleeding from BMB wound after removing bandage  Secondary to thrombocytopenia  Ordered 4 units of plts  Apply pressure wound  Follow hgb.  10/28:  Resolved this am.

## 2021-10-28 NOTE — PROGRESS NOTES
"Pharmacy Chemotherapy Verification    Patient Name: Alfonso Gaspar   Dx; AML  Protocol: AzaCITIDine + venetoclax       *Dosing Reference*  AzaCITIDine 75 mg/m2 IV over 10-40 minutes daily on Days 1-7  Venetoclax dose titration for voriconazole drug interaction              -10 mg PO on Day 1 followed by              -20 mg PO on Day 2 followed by               -50 mg PO on Day 3 followed by               -100 mg PO daily on Days 4-28  28 day cycle for 1 cycle  NCCN Guidelines for Acute Myeloid Leukemia.V.3.2020  Aron MYLES et al. Lancet Oncol. 2018;19(2):216-228  Aron MYLES et al. Blood. 2019;133(1):7-17    Allergies:  Pcn [penicillins]     /57   Pulse 90   Temp 36.6 °C (97.9 °F) (Oral)   Resp 16   Ht 1.778 m (5' 10\")   Wt 81.7 kg (180 lb 1.9 oz)   SpO2 95%   BMI 25.84 kg/m²  Body surface area is 2.01 meters squared.    Labs 10/29/21  ANC 1180 Hgb 7.3 Plt 33k  SCr 0.73 CrCl 97 mL/min   AST/ALT/AP = 56/28/119 Tbili = 0.6  Uric Acid 4.8     **MD aware of labs, OK to proceed with treatment as outlined below.Transfusion parameters in place for Hgb <7 and platelets <10k.**    Drug Order   (Drug name, dose, route, IV Fluid & volume, frequency, number of doses) Cycle: 1 Day 4 of 7    Previous treatment: N/A     Medication = azaCITIDtine   Base Dose = 75 mg/m2   Calc Dose: Base Dose x 2.01 m2 = 150.75 mg  Final Dose = 150.8 mg  Route = IV  Fluid & Volume =  mL  Admin Duration = Over 10-40 minutes           <10% difference, OK to treat with final dose      By my signature below, I confirm this process was performed independently with the BSA and all final chemotherapy dosing calculations congruent. I have reviewed the above chemotherapy order and that my calculation of the final dose and BSA (when applicable) corroborate those calculations of the  pharmacist. Discrepancies of 5% or greater in the written dose have been addressed and documented within the [facility] Progress notes.    Signature: " Diane Leroy, PharmD, BCOP

## 2021-10-28 NOTE — PROGRESS NOTES
[facility] Medicine Daily Progress Note    Date of Service    10/25/21    Chief Complaint  Alfonso Gaspar is a 76 y.o. male admitted 10/18/2021 with leukocytosis.     Hospital Course  This is a 76 year old male with PMHx of atrial fibrillation (s/p cardioversion, off AC, Dr. Low), hypertension, hyperlipidemia, and gout who was admitted on 10/18/2021 due to abnormal labs. Patient was seen by cardiology, Dr. Low, patient had a cardioversion in September 2021, patient returned to NSR, patient taken off of Eliquis due to recurrent nosebleeds.  Patient is currently on flecainide.  Patient had 4-5 ER visits due to this recurrent epistaxis, requiring packing, cauterization, and surgical procedure performed by ENT.  Patient is currently off of any anticoagulation.    Patient was sent in by PCP due to findings of leukocytosis, anemia, and thrombocytopenia on repeat labs. Labs on admission noted hemoglobin of 7.4, patient was transfused 1 unit, hemoglobin remained at 7.3.  We will continue with serial hemoglobins and transfuse if less than 7. Oncology was consulted, they do recommend inpatient bone marrow biopsy, this was performed 10/19, by my colleague, Dr. Arthur Caruso.  Oncology following.    Interval Problem Update  10/26:  Initiated on vidaza and venetoclax today.  He denies acute pain, nausea, or vomiting.  His only complaint is nasal congestion which has been chronic since his recent episode epistaxis.  VSS.   10/27:  Patient developed severe bleed from BMB wound in setting of thrombocytopenia.  Ordered 4 units plts.  Pressure wound to be applied.  Follow hgb.  No current clinical evidence of hypovolemia.     Consultants/Specialty  oncology    Code Status  Full Code    Disposition  Patient is not medically cleared.   Anticipate discharge to to home with close outpatient follow-up.  I have placed the appropriate orders for post-discharge needs.    Review of Systems  Review of Systems   Constitutional: Positive for  malaise/fatigue. Negative for chills and fever.   HENT: Positive for congestion.         Post nasal drip; hoarseness   Eyes: Negative for blurred vision, double vision and discharge.   Respiratory: Positive for cough. Negative for shortness of breath.    Cardiovascular: Negative for chest pain and palpitations.   Gastrointestinal: Negative for abdominal pain, diarrhea, heartburn, nausea and vomiting.   Genitourinary: Negative for dysuria and hematuria.   Musculoskeletal: Negative for myalgias.   Skin: Negative for itching and rash.   Neurological: Negative for dizziness and focal weakness.   Endo/Heme/Allergies: Bruises/bleeds easily.   Psychiatric/Behavioral: Negative for depression.   All other systems reviewed and are negative.       Physical Exam  Temp:  [36.4 °C (97.5 °F)-36.7 °C (98.1 °F)] 36.4 °C (97.5 °F)  Pulse:  [90-97] 97  Resp:  [18-20] 20  BP: ()/(49-60) 102/49  SpO2:  [92 %-96 %] 96 %    Physical Exam  Vitals and nursing note reviewed.   Constitutional:       General: He is not in acute distress.     Appearance: Normal appearance. He is not ill-appearing.      Interventions: Nasal cannula in place.   HENT:      Head: Normocephalic and atraumatic.      Nose: Congestion present.      Mouth/Throat:      Mouth: Mucous membranes are dry.      Pharynx: No oropharyngeal exudate or posterior oropharyngeal erythema.   Eyes:      General: No scleral icterus.        Right eye: No discharge.         Left eye: No discharge.      Conjunctiva/sclera: Conjunctivae normal.      Pupils: Pupils are equal, round, and reactive to light.   Cardiovascular:      Rate and Rhythm: Normal rate and regular rhythm.      Pulses: Normal pulses.      Heart sounds: Normal heart sounds. No murmur heard.     Pulmonary:      Effort: Pulmonary effort is normal. No respiratory distress.      Breath sounds: Normal breath sounds. No wheezing or rales.   Abdominal:      General: Bowel sounds are normal. There is no distension.       Palpations: Abdomen is soft.      Tenderness: There is no abdominal tenderness.   Musculoskeletal:         General: Normal range of motion.      Cervical back: Neck supple. No rigidity.   Skin:     General: Skin is warm and dry.      Coloration: Skin is not jaundiced or pale.      Comments: Bleeding wound from BMB site.  Pressure dressing applied.    Neurological:      General: No focal deficit present.      Mental Status: He is alert and oriented to person, place, and time.   Psychiatric:         Attention and Perception: Attention normal.         Mood and Affect: Mood normal.         Fluids  No intake or output data in the 24 hours ending 10/27/21 1902    Laboratory  Recent Labs     10/25/21  0310 10/26/21  0005 10/27/21  0005   WBC 49.4* 47.0* 30.6*   RBC 2.28* 2.31* 2.29*   HEMOGLOBIN 7.6* 7.6* 7.7*   HEMATOCRIT 22.9* 23.5* 23.5*   .4* 101.7* 102.6*   MCH 33.3* 32.9 33.6*   MCHC 33.2* 32.3* 32.8*   RDW 83.3* 83.7* 83.2*   PLATELETCT 33* 34* 34*     Recent Labs     10/26/21  0005 10/26/21  1345 10/27/21  0005   SODIUM 133* 133* 132*   POTASSIUM 4.2 4.0 4.8   CHLORIDE 103 102 102   CO2 22 21 20   GLUCOSE 87 127* 102*   BUN 18 20 22   CREATININE 0.48* 0.51 0.61   CALCIUM 7.9* 7.9* 7.5*                   Imaging  DX-CHEST-PORTABLE (1 VIEW)   Final Result      Right PICC line placement with the tip projecting over the proximal right atrium.      IR-PICC LINE PLACEMENT W/ GUIDANCE > AGE 5   Final Result                  Ultrasound-guided PICC placement performed by qualified nursing staff as    above.               Assessment/Plan  * AML (acute myeloblastic leukemia) (HCC)  Assessment & Plan  Based on initial interpretation of BMB  10/21 PICC  10/26 initiated on venetoclax and vidaza for 7 day regimen.  Prophylactic antifungal and antiviral initiated.  Oncology following.  Follow labs closely.     Bleeding from wound  Assessment & Plan  Severe bleeding from BMB wound after removing bandage  Secondary to  thrombocytopenia  Ordered 4 units of plts  Apply pressure wound  Follow hgb.    Post-nasal drip  Assessment & Plan  W hoarseness and dry cough -- present since last COVID infection; likely post-infectous  Sudafed as needed and follow blood pressure  Humidified O2  Ocean mist nasal spray as needed  Antihistamine PRN  10/24: Repeat COVID PCR negative. CXR clear.  10/25 Mucinex  10/27:  Much improved with benadryl and robitussin.     Hyponatremia  Assessment & Plan  Stable; monitor    Elevated LDH  Assessment & Plan  LDH baseline 347  Monitor following induction    Thrombocytopenia (HCC)- (present on admission)  Assessment & Plan  Tranfuse platelets prn  Likely secondary to marrow dysfunction  Daily CBC  10/27:  4 units plts ordered secondary to active bleed.     Leukocytosis- (present on admission)  Assessment & Plan  Likely secondary to Leukemia  S/p bone marrow biopsy 10/19/2021 by Dr. Arthur Caruso  Oncology consulted, Dr. ALEX Anguiano   Daily CBC w diff  Continue treatment per oncology      Atrial fibrillation (HCC)- (present on admission)  Assessment & Plan  Patient follows up with Dr. Low  Currently had a cardioversion in September 2021  As per cardiology no longer on anticoagulation  Patient to continue with flecainide  10/21: EKG - SR    Anemia- (present on admission)  Assessment & Plan  S/p bone marrow biopsy 10/19/2021 by Dr. Arthur Caruso  Oncology consulted, Dr. ALEX Anguiano   Required 1 unit PRBC on 10/18/2021  Monitor with serial hemoglobins, transfuse if hemoglobin is less than 7  10/21 Transfused; daily CBC w diff  Standing transfusion order placed by Onc    Essential hypertension, benign- (present on admission)  Assessment & Plan  Continue Home Medications  Labetalol ivp prn with parameters       VTE prophylaxis: SCDs/TEDs and pharmacologic prophylaxis contraindicated due to low platelets    I have performed a physical exam and reviewed and updated ROS and Plan today (10/27/2021). In review of yesterday's note  (10/26/2021), there are no changes except as documented above.

## 2021-10-28 NOTE — PROGRESS NOTES
Oncology/Hematology Progress Note               Author: Chau Marquis M.D. Date & Time created: 10/28/2021  11:56 AM     CC: Pancytopenia/leukemia  Interval History:  Patient with no new complaints besides of chronic congestion, no fever, chills, there is no maxillary sinus pain.  He did receive 1 unit of blood yesterday, tolerating treatment with Vidaza and venetoclax fairly well so far.    PMHx, PSurgHx, SocHx, FAMHx: Reviewed and unchanged    Review of Systems:  Review of Systems   Constitutional: Positive for malaise/fatigue. Negative for chills, fever and weight loss.   HENT: Positive for congestion. Negative for ear discharge, ear pain, hearing loss, nosebleeds and tinnitus.    Eyes: Negative for blurred vision, double vision and photophobia.   Respiratory: Negative for cough, hemoptysis, sputum production and shortness of breath.    Cardiovascular: Negative for chest pain, palpitations and orthopnea.   Gastrointestinal: Negative for abdominal pain, heartburn, nausea and vomiting.   Genitourinary: Negative for dysuria and urgency.   Skin: Negative for rash.   Neurological: Negative for dizziness and headaches.   Endo/Heme/Allergies: Negative for environmental allergies. Does not bruise/bleed easily.   Psychiatric/Behavioral: Negative for depression, substance abuse and suicidal ideas.       Physical Exam:  Physical Exam  Constitutional:       Appearance: Normal appearance.   HENT:      Head: Normocephalic and atraumatic.      Nose: Nose normal.      Mouth/Throat:      Mouth: Mucous membranes are moist.   Eyes:      Extraocular Movements: Extraocular movements intact.      Conjunctiva/sclera: Conjunctivae normal.   Cardiovascular:      Rate and Rhythm: Normal rate and regular rhythm.      Pulses: Normal pulses.      Heart sounds: Normal heart sounds.   Pulmonary:      Effort: Pulmonary effort is normal.      Breath sounds: Normal breath sounds.   Abdominal:      General: Abdomen is flat.   Musculoskeletal:          General: No swelling or tenderness.      Cervical back: Normal range of motion.   Neurological:      General: No focal deficit present.      Mental Status: He is alert and oriented to person, place, and time.   Psychiatric:         Mood and Affect: Mood normal.         Labs:          Recent Labs     10/26/21  1345 10/27/21  0005 10/28/21  0530   SODIUM 133* 132* 135   POTASSIUM 4.0 4.8 4.3   CHLORIDE 102 102 106   CO2 21 20 20   BUN 20 22 22   CREATININE 0.51 0.61 0.55   MAGNESIUM  --   --  1.9   PHOSPHORUS 3.3 4.4 3.9   CALCIUM 7.9* 7.5* 7.4*     Recent Labs     10/26/21  1345 10/27/21  0005 10/28/21  0530   ALTSGPT 32 31 27   ASTSGOT 58* 71* 72*   ALKPHOSPHAT 125* 136* 115*   TBILIRUBIN 0.7 0.6 0.8   GLUCOSE 127* 102* 85     Recent Labs     10/26/21  0005 10/26/21  0005 10/27/21  0005 10/27/21  1950 10/28/21  0530   RBC 2.31*  --  2.29*  --  2.33*   HEMOGLOBIN 7.6*   < > 7.7* 6.0* 7.7*   HEMATOCRIT 23.5*  --  23.5*  --  23.3*   PLATELETCT 34*  --  34*  --  49*    < > = values in this interval not displayed.     Recent Labs     10/26/21  0005 10/26/21  1345 10/27/21  0005 10/28/21  0530   WBC 47.0*  --  30.6* 7.8   NEUTSPOLYS 7.50*  --  11.50* 27.60*   LYMPHOCYTES 60.60*  --  37.20 47.40*   MONOCYTES 31.70*  --  6.20 8.60   EOSINOPHILS 0.00  --  0.00 0.00   BASOPHILS 0.20  --  0.00 1.70   ASTSGOT  --  58* 71* 72*   ALTSGPT  --  32 31 27   ALKPHOSPHAT  --  125* 136* 115*   TBILIRUBIN  --  0.7 0.6 0.8     Recent Labs     10/26/21  1345 10/27/21  0005 10/28/21  0530   SODIUM 133* 132* 135   POTASSIUM 4.0 4.8 4.3   CHLORIDE 102 102 106   CO2 21 20 20   GLUCOSE 127* 102* 85   BUN 20 22 22   CREATININE 0.51 0.61 0.55   CALCIUM 7.9* 7.5* 7.4*     Hemodynamics:  Temp (24hrs), Av.7 °C (98.1 °F), Min:36.6 °C (97.8 °F), Max:37.2 °C (98.9 °F)  Temperature: 36.6 °C (97.9 °F)  Pulse  Av.3  Min: 67  Max: 106   Blood Pressure : 101/60     Respiratory:    Respiration: 20, Pulse Oximetry: 94 %     Work Of Breathing /  Effort: Mild  RUL Breath Sounds: Expiratory Wheezes (anterior), RML Breath Sounds: Diminished, RLL Breath Sounds: Diminished, JOE Breath Sounds: Expiratory Wheezes (anterior), LLL Breath Sounds: Diminished  Fluids:    Intake/Output Summary (Last 24 hours) at 10/23/2021 1150  Last data filed at 10/23/2021 0900  Gross per 24 hour   Intake 1310 ml   Output --   Net 1310 ml     Weight: 81.7 kg (180 lb 1.9 oz)  GI/Nutrition:  Orders Placed This Encounter   Procedures   • Diet Order Diet: Cardiac     Standing Status:   Standing     Number of Occurrences:   1     Order Specific Question:   Diet:     Answer:   Cardiac [6]     Medical Decision Making, by Problem:  Active Hospital Problems    Diagnosis    • *AML (acute myeloblastic leukemia) (HCC) [C92.00]    • Post-nasal drip [R09.82]    • Elevated LDH [R74.02]    • Hyponatremia [E87.1]    • Leukocytosis [D72.829]    • Thrombocytopenia (HCC) [D69.6]    • Atrial fibrillation (HCC) [I48.91]    • Anemia [D64.9]    • Essential hypertension, benign [I10]      Procedures    PICC line right arm 10/22/2021    Impression  1.  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  Negative mutation status  (IDH1/2, FLT3, NPM 1 and CEBPA). FISH negative.  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  Leukocytosis/anemia/thrombocytopenia all secondary to #1  -Transfuse if hemoglobin less than 7 g/dL or platelets less than 10,000,  irradiated, CMV negative blood products    3.  History of atrial fibrillation post cardioversion September 2021 on flecainide    4.  History of hypertension    5.  Covid infection in July 2021 with chronic sinus symptoms  -10/24 repeated Covid test negative by PCR     6.  Tumor lysis prophylaxis  -On allopurinol 300 mg daily    7.  Antibiotic prophylaxis with  acyclovir and voriconazole    Plan  -Patient is doing fairly well, day 3 after treatment today.  No major side effects  -Transfuse if hemoglobin less than 7 g/dL or platelets less than 10,000 or bleeding  -Plan to keep him in the hospital at least for the first cycle of therapy (4 weeks)  -Chronic sinus congestion,  likely allergic, no signs of infection  -Continue on allopurinol, daily labs for tumor lysis prevention    high complexity/complex decision making      Quality-Core Measures

## 2021-10-28 NOTE — PROGRESS NOTES
[facility] Medicine Daily Progress Note    Date of Service    10/25/21    Chief Complaint  Alfonso Gaspar is a 76 y.o. male admitted 10/18/2021 with leukocytosis.     Hospital Course  This is a 76 year old male with PMHx of atrial fibrillation (s/p cardioversion, off AC, Dr. Low), hypertension, hyperlipidemia, and gout who was admitted on 10/18/2021 due to abnormal labs. Patient was seen by cardiology, Dr. Low, patient had a cardioversion in September 2021, patient returned to NSR, patient taken off of Eliquis due to recurrent nosebleeds.  Patient is currently on flecainide.  Patient had 4-5 ER visits due to this recurrent epistaxis, requiring packing, cauterization, and surgical procedure performed by ENT.  Patient is currently off of any anticoagulation.    Patient was sent in by PCP due to findings of leukocytosis, anemia, and thrombocytopenia on repeat labs. Labs on admission noted hemoglobin of 7.4, patient was transfused 1 unit, hemoglobin remained at 7.3.  We will continue with serial hemoglobins and transfuse if less than 7. Oncology was consulted, they do recommend inpatient bone marrow biopsy, this was performed 10/19, by my colleague, Dr. Arthur Caruso.  Oncology following.    Interval Problem Update  10/26:  Initiated on vidaza and venetoclax today.  He denies acute pain, nausea, or vomiting.  His only complaint is nasal congestion which has been chronic since his recent episode epistaxis.  VSS.   10/27:  Patient developed severe bleed from BMB wound in setting of thrombocytopenia.  Ordered 4 units plts.  Pressure wound to be applied.  Follow hgb.  No current clinical evidence of hypovolemia.   10/28:  Active bleed resolved today.  He did require PRBC transfusion overnight, however hgb stable this morning.  He does complain of ongoing nasal congestion.  Trial Allegra.  Denies acute pain or discomfort.     Consultants/Specialty  oncology    Code Status  Full Code    Disposition  Patient is not medically  cleared.   Anticipate discharge to to home with close outpatient follow-up.  I have placed the appropriate orders for post-discharge needs.    Review of Systems  Review of Systems   Constitutional: Negative for chills, fever and malaise/fatigue.   HENT: Positive for congestion. Negative for sore throat.         Post nasal drip; hoarseness   Eyes: Negative for blurred vision and double vision.   Respiratory: Positive for cough. Negative for shortness of breath.    Cardiovascular: Negative for chest pain, palpitations and leg swelling.   Gastrointestinal: Negative for abdominal pain, constipation, diarrhea, heartburn, nausea and vomiting.   Genitourinary: Negative for dysuria.   Musculoskeletal: Negative for myalgias.   Skin: Negative for itching and rash.   Neurological: Negative for dizziness and focal weakness.   Endo/Heme/Allergies: Bruises/bleeds easily.   Psychiatric/Behavioral: Negative for depression.   All other systems reviewed and are negative.       Physical Exam  Temp:  [36.6 °C (97.8 °F)-37.2 °C (98.9 °F)] 36.6 °C (97.9 °F)  Pulse:  [] 100  Resp:  [18-24] 20  BP: ()/(39-60) 101/60  SpO2:  [90 %-96 %] 94 %    Physical Exam  Vitals and nursing note reviewed.   Constitutional:       General: He is not in acute distress.     Appearance: Normal appearance. He is not toxic-appearing.      Interventions: Nasal cannula in place.   HENT:      Head: Normocephalic and atraumatic.      Nose: Congestion present.      Mouth/Throat:      Mouth: Mucous membranes are dry.      Pharynx: No oropharyngeal exudate or posterior oropharyngeal erythema.   Eyes:      General:         Right eye: No discharge.         Left eye: No discharge.      Conjunctiva/sclera: Conjunctivae normal.      Pupils: Pupils are equal, round, and reactive to light.   Cardiovascular:      Rate and Rhythm: Normal rate and regular rhythm.      Pulses: Normal pulses.      Heart sounds: Normal heart sounds. No murmur heard.     Pulmonary:       Effort: Pulmonary effort is normal. No respiratory distress.      Breath sounds: Normal breath sounds. No wheezing.   Abdominal:      General: Bowel sounds are normal. There is no distension.      Palpations: Abdomen is soft.      Tenderness: There is no abdominal tenderness. There is no guarding.   Musculoskeletal:         General: Normal range of motion.      Cervical back: Neck supple. No rigidity or tenderness.   Skin:     General: Skin is warm and dry.      Coloration: Skin is not jaundiced or pale.      Comments: Bleeding wound from BMB site resolved.    Neurological:      General: No focal deficit present.      Mental Status: He is alert and oriented to person, place, and time.   Psychiatric:         Attention and Perception: Attention normal.         Mood and Affect: Mood normal.         Fluids    Intake/Output Summary (Last 24 hours) at 10/28/2021 0920  Last data filed at 10/28/2021 0345  Gross per 24 hour   Intake 674 ml   Output --   Net 674 ml       Laboratory  Recent Labs     10/26/21  0005 10/26/21  0005 10/27/21  0005 10/27/21  1950 10/28/21  0530   WBC 47.0*  --  30.6*  --  7.8   RBC 2.31*  --  2.29*  --  2.33*   HEMOGLOBIN 7.6*   < > 7.7* 6.0* 7.7*   HEMATOCRIT 23.5*  --  23.5*  --  23.3*   .7*  --  102.6*  --  100.0*   MCH 32.9  --  33.6*  --  33.0   MCHC 32.3*  --  32.8*  --  33.0*   RDW 83.7*  --  83.2*  --  71.7*   PLATELETCT 34*  --  34*  --  49*   MPV  --   --   --   --  12.0    < > = values in this interval not displayed.     Recent Labs     10/26/21  1345 10/27/21  0005 10/28/21  0530   SODIUM 133* 132* 135   POTASSIUM 4.0 4.8 4.3   CHLORIDE 102 102 106   CO2 21 20 20   GLUCOSE 127* 102* 85   BUN 20 22 22   CREATININE 0.51 0.61 0.55   CALCIUM 7.9* 7.5* 7.4*                   Imaging  DX-CHEST-PORTABLE (1 VIEW)   Final Result      Right PICC line placement with the tip projecting over the proximal right atrium.      IR-PICC LINE PLACEMENT W/ GUIDANCE > AGE 5   Final Result                   Ultrasound-guided PICC placement performed by qualified nursing staff as    above.               Assessment/Plan  * AML (acute myeloblastic leukemia) (HCC)  Assessment & Plan  Based on initial interpretation of BMB  10/21 PICC  10/26 initiated on venetoclax and vidaza for 7 day regimen.  Prophylactic antifungal and antiviral initiated.  Oncology following.  Follow labs closely.     Bleeding from wound  Assessment & Plan  Severe bleeding from BMB wound after removing bandage  Secondary to thrombocytopenia  Ordered 4 units of plts  Apply pressure wound  Follow hgb.  10/28:  Resolved this am.     Post-nasal drip  Assessment & Plan  W hoarseness and dry cough -- present since last COVID infection; likely post-infectous  Sudafed as needed and follow blood pressure  Humidified O2  Ocean mist nasal spray as needed  Antihistamine PRN  10/24: Repeat COVID PCR negative. CXR clear.  10/25 Mucinex  10/27:  Much improved with benadryl and robitussin.   10/28:  Worse today.  Trial allegra.     Hyponatremia  Assessment & Plan  Stable; monitor    Elevated LDH  Assessment & Plan  LDH baseline 347  Monitor following induction    Thrombocytopenia (HCC)- (present on admission)  Assessment & Plan  Tranfuse platelets prn  Likely secondary to marrow dysfunction  Daily CBC  10/27:  4 units plts ordered secondary to active bleed.     Leukocytosis- (present on admission)  Assessment & Plan  Likely secondary to Leukemia  S/p bone marrow biopsy 10/19/2021 by Dr. Arthur Caruso  Oncology consulted, Dr. ALEX Anguiano   Daily CBC w diff  Continue treatment per oncology      Atrial fibrillation (HCC)- (present on admission)  Assessment & Plan  Patient follows up with Dr. Low  Currently had a cardioversion in September 2021  As per cardiology no longer on anticoagulation  Patient to continue with flecainide  10/21: EKG - SR    Anemia- (present on admission)  Assessment & Plan  S/p bone marrow biopsy 10/19/2021 by Dr. Arthur Caruso  Oncology consulted,  Dr. ALEX Anguiano   Required 1 unit PRBC on 10/18/2021  Monitor with serial hemoglobins, transfuse if hemoglobin is less than 7  10/21 Transfused; daily CBC w diff  Standing transfusion order placed by Onc  10/28:  Required blood transfusion overnight secondary to active bleed. Bleed has resolved.  Hgb stable this am.     Essential hypertension, benign- (present on admission)  Assessment & Plan  Continue Home Medications  Labetalol ivp prn with parameters       VTE prophylaxis: SCDs/TEDs and pharmacologic prophylaxis contraindicated due to low platelets    I have performed a physical exam and reviewed and updated ROS and Plan today (10/28/2021). In review of yesterday's note (10/27/2021), there are no changes except as documented above.

## 2021-10-28 NOTE — CARE PLAN
The patient is Stable - Low risk of patient condition declining or worsening    Shift Goals  Clinical Goals: Stablize hemodynamics  Patient Goals: Rest, Stop bleeding form biopsy site     Progress made toward(s) clinical / shift goals:    Problem: Knowledge Deficit - Standard  Goal: Patient and family/care givers will demonstrate understanding of plan of care, disease process/condition, diagnostic tests and medications  10/28/2021 0650 by Esthela Mary, R.N.  Outcome: Progressing  Note: Pt wasn't able to rest very much throughout the night due to multiple infusions. Was able to get the biopsy site to stop bleeding and pt was able to rest comfortably on his side.   10/28/2021 0650 by Esthela Mary, R.N.  Outcome: Progressing     Problem: Hemodynamics  Goal: Patient's hemodynamics, fluid balance and neurologic status will be stable or improve  Outcome: Progressing  Note: After Pt Plt and RBC infusion, Pt numbers did increase. Pt resting comfortably at this time with no active bleeding.        Patient is not progressing towards the following goals:

## 2021-10-28 NOTE — PROGRESS NOTES
Chemotherapy Verification - PRIMARY RN  Cycle 1, Day 3      Height = 177.8 cm  Weight = 81.7 kg  BSA = 2.01m2       Medication: azacitidine (vidaza)  Dose: 75 mg/m2  Calculated Dose: 150.75 mg, Dose ordered 150.8 mg.                             (In mg/m2, AUC, mg/kg)       I confirm this process was performed independently with the BSA and all final chemotherapy dosing calculations congruent.  Any discrepancies of 10% or greater have been addressed with the chemotherapy pharmacist. The resolution of the discrepancy has been documented in the EPIC progress notes.

## 2021-10-28 NOTE — WOUND TEAM
In to see per RN request bleeding back wound prior biopsy site. RN had removed band-aid disrupting scab and wound has continuous oozing of blood. Cleaned with NS, dried. Applied surgicel to wound bed and stacked multiple layers holding pressure trying to get bleeding to stop. Then placed folded 4x4 x 2 and covered with non-adhesive foam. Secured with hypafix tape placed under tension to create pressure drsg. Pt had no c/o pain and drsg dated and timed for record if bleeding continues.

## 2021-10-28 NOTE — CARE PLAN
Problem: Knowledge Deficit - Standard  Goal: Patient and family/care givers will demonstrate understanding of plan of care, disease process/condition, diagnostic tests and medications  Outcome: Progressing     Problem: Fall Risk  Goal: Patient will remain free from falls  Outcome: Progressing     Problem: Mobility  Goal: Patient's capacity to carry out activities will improve  Outcome: Progressing     Problem: Self Care  Goal: Patient will have the ability to perform ADLs independently or with assistance (bathe, groom, dress, toilet and feed)  Outcome: Progressing   The patient is stable    Shift Goals  Clinical Goals: rest  Patient Goals: rest    Progress made toward(s) clinical / shift goals: walking    Patient is not progressing towards the following goals:none

## 2021-10-28 NOTE — PROGRESS NOTES
Chemotherapy Verification - SECONDARY RN       Height = 177.8 cm  Weight = 81.7 kg  BSA = 2.01 m2       Medication: Azacitidine  Dose: 75 mg/m2  Calculated Dose: 150.75 mg                      Ordered dose: 150.8 mg       (In mg/m2, AUC, mg/kg)       I confirm that this process was performed independently.

## 2021-10-29 LAB
ALBUMIN SERPL BCP-MCNC: 2.3 G/DL (ref 3.2–4.9)
ALBUMIN/GLOB SERPL: 0.9 G/DL
ALP SERPL-CCNC: 119 U/L (ref 30–99)
ALT SERPL-CCNC: 28 U/L (ref 2–50)
ANION GAP SERPL CALC-SCNC: 8 MMOL/L (ref 7–16)
ANISOCYTOSIS BLD QL SMEAR: ABNORMAL
AST SERPL-CCNC: 56 U/L (ref 12–45)
BASOPHILS # BLD AUTO: 0 % (ref 0–1.8)
BASOPHILS # BLD: 0 K/UL (ref 0–0.12)
BILIRUB SERPL-MCNC: 0.6 MG/DL (ref 0.1–1.5)
BLASTS NFR BLD MANUAL: 6.3 %
BUN SERPL-MCNC: 24 MG/DL (ref 8–22)
BURR CELLS BLD QL SMEAR: NORMAL
CALCIUM SERPL-MCNC: 7.8 MG/DL (ref 8.5–10.5)
CHLORIDE SERPL-SCNC: 107 MMOL/L (ref 96–112)
CO2 SERPL-SCNC: 21 MMOL/L (ref 20–33)
CREAT SERPL-MCNC: 0.73 MG/DL (ref 0.5–1.4)
EOSINOPHIL # BLD AUTO: 0 K/UL (ref 0–0.51)
EOSINOPHIL NFR BLD: 0 % (ref 0–6.9)
ERYTHROCYTE [DISTWIDTH] IN BLOOD BY AUTOMATED COUNT: 78.3 FL (ref 35.9–50)
GLOBULIN SER CALC-MCNC: 2.7 G/DL (ref 1.9–3.5)
GLUCOSE SERPL-MCNC: 104 MG/DL (ref 65–99)
HCT VFR BLD AUTO: 22.6 % (ref 42–52)
HGB BLD-MCNC: 7.3 G/DL (ref 14–18)
LYMPHOCYTES # BLD AUTO: 5.13 K/UL (ref 1–4.8)
LYMPHOCYTES NFR BLD: 70.3 % (ref 22–41)
MACROCYTES BLD QL SMEAR: ABNORMAL
MAGNESIUM SERPL-MCNC: 1.9 MG/DL (ref 1.5–2.5)
MANUAL DIFF BLD: NORMAL
MCH RBC QN AUTO: 32.7 PG (ref 27–33)
MCHC RBC AUTO-ENTMCNC: 32.3 G/DL (ref 33.7–35.3)
MCV RBC AUTO: 101.3 FL (ref 81.4–97.8)
MICROCYTES BLD QL SMEAR: ABNORMAL
MONOCYTES # BLD AUTO: 0.53 K/UL (ref 0–0.85)
MONOCYTES NFR BLD AUTO: 7.2 % (ref 0–13.4)
MORPHOLOGY BLD-IMP: NORMAL
NEUTROPHILS # BLD AUTO: 1.18 K/UL (ref 1.82–7.42)
NEUTROPHILS NFR BLD: 16.2 % (ref 44–72)
NRBC # BLD AUTO: 1.02 K/UL
NRBC BLD-RTO: 14 /100 WBC
OVALOCYTES BLD QL SMEAR: NORMAL
PHOSPHATE SERPL-MCNC: 4.4 MG/DL (ref 2.5–4.5)
PLATELET # BLD AUTO: 33 K/UL (ref 164–446)
PLATELET BLD QL SMEAR: NORMAL
PLATELETS.RETICULATED NFR BLD AUTO: 15.1 K/UL (ref 0.6–13.1)
PMV BLD AUTO: 11.2 FL (ref 9–12.9)
POIKILOCYTOSIS BLD QL SMEAR: NORMAL
POTASSIUM SERPL-SCNC: 4.7 MMOL/L (ref 3.6–5.5)
PROT SERPL-MCNC: 5 G/DL (ref 6–8.2)
RBC # BLD AUTO: 2.23 M/UL (ref 4.7–6.1)
RBC BLD AUTO: PRESENT
SMUDGE CELLS BLD QL SMEAR: NORMAL
SODIUM SERPL-SCNC: 136 MMOL/L (ref 135–145)
URATE SERPL-MCNC: 4.8 MG/DL (ref 2.5–8.3)
WBC # BLD AUTO: 7.3 K/UL (ref 4.8–10.8)

## 2021-10-29 PROCEDURE — A9270 NON-COVERED ITEM OR SERVICE: HCPCS | Performed by: INTERNAL MEDICINE

## 2021-10-29 PROCEDURE — A9270 NON-COVERED ITEM OR SERVICE: HCPCS | Performed by: NURSE PRACTITIONER

## 2021-10-29 PROCEDURE — 700102 HCHG RX REV CODE 250 W/ 637 OVERRIDE(OP): Performed by: STUDENT IN AN ORGANIZED HEALTH CARE EDUCATION/TRAINING PROGRAM

## 2021-10-29 PROCEDURE — A9270 NON-COVERED ITEM OR SERVICE: HCPCS | Performed by: STUDENT IN AN ORGANIZED HEALTH CARE EDUCATION/TRAINING PROGRAM

## 2021-10-29 PROCEDURE — 700102 HCHG RX REV CODE 250 W/ 637 OVERRIDE(OP): Performed by: NURSE PRACTITIONER

## 2021-10-29 PROCEDURE — 700102 HCHG RX REV CODE 250 W/ 637 OVERRIDE(OP): Performed by: GENERAL PRACTICE

## 2021-10-29 PROCEDURE — 700105 HCHG RX REV CODE 258: Performed by: INTERNAL MEDICINE

## 2021-10-29 PROCEDURE — 99231 SBSQ HOSP IP/OBS SF/LOW 25: CPT | Performed by: NURSE PRACTITIONER

## 2021-10-29 PROCEDURE — 700102 HCHG RX REV CODE 250 W/ 637 OVERRIDE(OP): Performed by: INTERNAL MEDICINE

## 2021-10-29 PROCEDURE — 700111 HCHG RX REV CODE 636 W/ 250 OVERRIDE (IP): Mod: JG | Performed by: INTERNAL MEDICINE

## 2021-10-29 PROCEDURE — 770004 HCHG ROOM/CARE - ONCOLOGY PRIVATE *

## 2021-10-29 PROCEDURE — A9270 NON-COVERED ITEM OR SERVICE: HCPCS | Performed by: GENERAL PRACTICE

## 2021-10-29 RX ORDER — ONDANSETRON 2 MG/ML
8 INJECTION INTRAMUSCULAR; INTRAVENOUS ONCE
Status: COMPLETED | OUTPATIENT
Start: 2021-10-29 | End: 2021-10-29

## 2021-10-29 RX ADMIN — DOCUSATE SODIUM 50 MG AND SENNOSIDES 8.6 MG 2 TABLET: 8.6; 5 TABLET, FILM COATED ORAL at 17:33

## 2021-10-29 RX ADMIN — Medication 1 TABLET: at 05:10

## 2021-10-29 RX ADMIN — GUAIFENESIN 600 MG: 600 TABLET, EXTENDED RELEASE ORAL at 17:33

## 2021-10-29 RX ADMIN — FLECAINIDE ACETATE 50 MG: 50 TABLET ORAL at 05:09

## 2021-10-29 RX ADMIN — VORICONAZOLE 200 MG: 200 TABLET ORAL at 17:33

## 2021-10-29 RX ADMIN — ALLOPURINOL 300 MG: 300 TABLET ORAL at 05:10

## 2021-10-29 RX ADMIN — ALBUTEROL SULFATE 2 PUFF: 90 AEROSOL, METERED RESPIRATORY (INHALATION) at 15:26

## 2021-10-29 RX ADMIN — VORICONAZOLE 200 MG: 200 TABLET ORAL at 05:10

## 2021-10-29 RX ADMIN — ALBUTEROL SULFATE 2 PUFF: 90 AEROSOL, METERED RESPIRATORY (INHALATION) at 12:05

## 2021-10-29 RX ADMIN — ONDANSETRON 8 MG: 2 INJECTION INTRAMUSCULAR; INTRAVENOUS at 14:08

## 2021-10-29 RX ADMIN — ACYCLOVIR 400 MG: 200 CAPSULE ORAL at 17:34

## 2021-10-29 RX ADMIN — AZACITIDINE 150.8 MG: 100 INJECTION, POWDER, LYOPHILIZED, FOR SOLUTION SUBCUTANEOUS at 14:33

## 2021-10-29 RX ADMIN — FLECAINIDE ACETATE 50 MG: 50 TABLET ORAL at 17:34

## 2021-10-29 RX ADMIN — FEXOFENADINE HCL 60 MG: 60 TABLET, FILM COATED ORAL at 05:09

## 2021-10-29 RX ADMIN — ALBUTEROL SULFATE 2 PUFF: 90 AEROSOL, METERED RESPIRATORY (INHALATION) at 19:35

## 2021-10-29 RX ADMIN — ACYCLOVIR 400 MG: 200 CAPSULE ORAL at 05:10

## 2021-10-29 RX ADMIN — ENALAPRIL MALEATE 10 MG: 10 TABLET ORAL at 05:09

## 2021-10-29 RX ADMIN — ALBUTEROL SULFATE 2 PUFF: 90 AEROSOL, METERED RESPIRATORY (INHALATION) at 05:09

## 2021-10-29 RX ADMIN — VENETOCLAX 100 MG: 100 TABLET, FILM COATED ORAL at 14:33

## 2021-10-29 RX ADMIN — FERROUS SULFATE TAB 325 MG (65 MG ELEMENTAL FE) 325 MG: 325 (65 FE) TAB at 05:10

## 2021-10-29 RX ADMIN — Medication 2000 UNITS: at 05:11

## 2021-10-29 RX ADMIN — GUAIFENESIN 600 MG: 600 TABLET, EXTENDED RELEASE ORAL at 05:10

## 2021-10-29 ASSESSMENT — ENCOUNTER SYMPTOMS
DIARRHEA: 0
BRUISES/BLEEDS EASILY: 1
FEVER: 0
SHORTNESS OF BREATH: 0
HEARTBURN: 0
BLURRED VISION: 0
ORTHOPNEA: 0
PALPITATIONS: 0
MYALGIAS: 0
NAUSEA: 0
WEIGHT LOSS: 0
FOCAL WEAKNESS: 0
DOUBLE VISION: 0
VOMITING: 0
HEMOPTYSIS: 0
DEPRESSION: 0
COUGH: 1
CONSTIPATION: 0
PHOTOPHOBIA: 0
ABDOMINAL PAIN: 0
BRUISES/BLEEDS EASILY: 0
COUGH: 0
HEADACHES: 0
DIZZINESS: 0
SPUTUM PRODUCTION: 0
SORE THROAT: 0
CHILLS: 0

## 2021-10-29 ASSESSMENT — PAIN DESCRIPTION - PAIN TYPE
TYPE: ACUTE PAIN
TYPE: ACUTE PAIN

## 2021-10-29 ASSESSMENT — FIBROSIS 4 INDEX: FIB4 SCORE: 24.69

## 2021-10-29 ASSESSMENT — LIFESTYLE VARIABLES: SUBSTANCE_ABUSE: 0

## 2021-10-29 NOTE — CARE PLAN
Problem: Fall Risk  Goal: Patient will remain free from falls  Outcome: Progressing     Problem: Mobility  Goal: Patient's capacity to carry out activities will improve  Outcome: Progressing     The patient is Watcher - Medium risk of patient condition declining or worsening    Shift Goals  Clinical Goals: Stablize hemodynamics  Patient Goals: Rest, Stop bleeding form biopsy site     Progress made toward(s) clinical / shift goals:  Pt educated on fall precautions    Patient is not progressing towards the following goals:

## 2021-10-29 NOTE — PROGRESS NOTES
Chemotherapy Verification - SECONDARY RN       Height = 177.8 cm  Weight = 80.2 kg  BSA = 1.99 m2       Medication: Azacitidine  Dose: 75 mg/m2  Calculated Dose: 149.25 mg                     Ordered dose: 150.8 mg       (In mg/m2, AUC, mg/kg)       I confirm that this process was performed independently.

## 2021-10-29 NOTE — PROGRESS NOTES
Chemotherapy Verification - PRIMARY RN  Cycle 1 Day 4      Height = 177.8 cm  Weight = 80.2 kg  BSA = 1.99       Medication: Azacitidine  Dose: 75 mg/m2  Calculated Dose: 149.25 mg (150.8 mg ordered)                             (In mg/m2, AUC, mg/kg)       I confirm this process was performed independently with the BSA and all final chemotherapy dosing calculations congruent.  Any discrepancies of 10% or greater have been addressed with the chemotherapy pharmacist. The resolution of the discrepancy has been documented in the EPIC progress notes.

## 2021-10-29 NOTE — CARE PLAN
The patient is Watcher - Medium risk of patient condition declining or worsening    Shift Goals  Clinical Goals: Day 4 chemo  Patient Goals: Rest; no bleeding from biopsy site    Progress made toward(s) clinical / shift goals:    Problem: Knowledge Deficit - Standard  Goal: Patient and family/care givers will demonstrate understanding of plan of care, disease process/condition, diagnostic tests and medications  Outcome: Progressing     Problem: Fall Risk  Goal: Patient will remain free from falls  Outcome: Progressing     Problem: Mobility  Goal: Patient's capacity to carry out activities will improve  Outcome: Progressing     Problem: Self Care  Goal: Patient will have the ability to perform ADLs independently or with assistance (bathe, groom, dress, toilet and feed)  Outcome: Progressing     Problem: Hemodynamics  Goal: Patient's hemodynamics, fluid balance and neurologic status will be stable or improve  Outcome: Progressing       COVID 19 surge in effect

## 2021-10-29 NOTE — PROGRESS NOTES
Oncology/Hematology Progress Note               Author: Chau Marquis M.D. Date & Time created: 10/29/2021  10:37 AM     CC: Pancytopenia/leukemia  Interval History:  The patient has no new complaints.  Blood counts stable.  He has been tolerating treatment fairly well.  Day 4 of Vidaza and venetoclax today.  Patient has been ambulating in hallway, he does feel tired and fatigued but it is not getting worse and mild to moderate.  He did not complain of congestion today    PMHx, PSurgHx, SocHx, FAMHx: Reviewed and unchanged    Review of Systems:  Review of Systems   Constitutional: Positive for malaise/fatigue. Negative for chills, fever and weight loss.   HENT: Negative for congestion, ear discharge, ear pain, hearing loss, nosebleeds and tinnitus.    Eyes: Negative for blurred vision, double vision and photophobia.   Respiratory: Negative for cough, hemoptysis, sputum production and shortness of breath.    Cardiovascular: Negative for chest pain, palpitations and orthopnea.   Gastrointestinal: Negative for abdominal pain, heartburn, nausea and vomiting.   Genitourinary: Negative for dysuria and urgency.   Skin: Negative for rash.   Neurological: Negative for dizziness and headaches.   Endo/Heme/Allergies: Negative for environmental allergies. Does not bruise/bleed easily.   Psychiatric/Behavioral: Negative for depression, substance abuse and suicidal ideas.       Physical Exam:  Physical Exam  Constitutional:       Appearance: Normal appearance.   HENT:      Head: Normocephalic and atraumatic.      Nose: Nose normal.      Mouth/Throat:      Mouth: Mucous membranes are moist.   Eyes:      Extraocular Movements: Extraocular movements intact.      Conjunctiva/sclera: Conjunctivae normal.   Cardiovascular:      Rate and Rhythm: Normal rate and regular rhythm.      Pulses: Normal pulses.      Heart sounds: Normal heart sounds.   Pulmonary:      Effort: Pulmonary effort is normal.      Breath sounds: Normal breath  sounds.   Abdominal:      General: Abdomen is flat.   Musculoskeletal:         General: No swelling or tenderness.      Cervical back: Normal range of motion.   Neurological:      General: No focal deficit present.      Mental Status: He is alert and oriented to person, place, and time.   Psychiatric:         Mood and Affect: Mood normal.         Labs:          Recent Labs     10/27/21  0005 10/28/21  0530 10/28/21  2342   SODIUM 132* 135 136   POTASSIUM 4.8 4.3 4.7   CHLORIDE 102 106 107   CO2 20 20    BUN 22 22 24*   CREATININE 0.61 0.55 0.73   MAGNESIUM  --  1.9 1.9   PHOSPHORUS 4.4 3.9 4.4   CALCIUM 7.5* 7.4* 7.8*     Recent Labs     10/27/21  0005 10/28/21  0530 10/28/21  2342   ALTSGPT    ASTSGOT 71* 72* 56*   ALKPHOSPHAT 136* 115* 119*   TBILIRUBIN 0.6 0.8 0.6   GLUCOSE 102* 85 104*     Recent Labs     10/27/21  0005 10/27/21  0005 10/27/21  1950 10/28/21  0530 10/28/21  2342   RBC 2.29*  --   --  2.33* 2.23*   HEMOGLOBIN 7.7*   < > 6.0* 7.7* 7.3*   HEMATOCRIT 23.5*  --   --  23.3* 22.6*   PLATELETCT 34*  --   --  49* 33*    < > = values in this interval not displayed.     Recent Labs     10/27/21  0005 10/28/21  0530 10/28/21  2342   WBC 30.6* 7.8 7.3   NEUTSPOLYS 11.50* 27.60* 16.20*   LYMPHOCYTES 37.20 47.40* 70.30*   MONOCYTES 6.20 8.60 7.20   EOSINOPHILS 0.00 0.00 0.00   BASOPHILS 0.00 1.70 0.00   ASTSGOT 71* 72* 56*   ALTSGPT    ALKPHOSPHAT 136* 115* 119*   TBILIRUBIN 0.6 0.8 0.6     Recent Labs     10/27/21  0005 10/28/21  0530 10/28/21  2342   SODIUM 132* 135 136   POTASSIUM 4.8 4.3 4.7   CHLORIDE 102 106 107   CO2  21   GLUCOSE 102* 85 104*   BUN 22 22 24*   CREATININE 0.61 0.55 0.73   CALCIUM 7.5* 7.4* 7.8*     Hemodynamics:  Temp (24hrs), Av.6 °C (97.8 °F), Min:36.4 °C (97.6 °F), Max:36.6 °C (97.9 °F)  Temperature: 36.6 °C (97.9 °F)  Pulse  Av.4  Min: 67  Max: 106   Blood Pressure : 119/57     Respiratory:    Respiration: 16, Pulse Oximetry: 95 %     Work Of  Breathing / Effort: Mild  RUL Breath Sounds: Diminished, RML Breath Sounds: Diminished, RLL Breath Sounds: Diminished, JOE Breath Sounds: Diminished, LLL Breath Sounds: Diminished  Fluids:    Intake/Output Summary (Last 24 hours) at 10/23/2021 1150  Last data filed at 10/23/2021 0900  Gross per 24 hour   Intake 1310 ml   Output --   Net 1310 ml        GI/Nutrition:  Orders Placed This Encounter   Procedures   • Diet Order Diet: Cardiac     Standing Status:   Standing     Number of Occurrences:   1     Order Specific Question:   Diet:     Answer:   Cardiac [6]     Medical Decision Making, by Problem:  Active Hospital Problems    Diagnosis    • *AML (acute myeloblastic leukemia) (HCC) [C92.00]    • Post-nasal drip [R09.82]    • Elevated LDH [R74.02]    • Hyponatremia [E87.1]    • Leukocytosis [D72.829]    • Thrombocytopenia (HCC) [D69.6]    • Atrial fibrillation (HCC) [I48.91]    • Anemia [D64.9]    • Essential hypertension, benign [I10]      Procedures    PICC line right arm 10/22/2021    Impression  1.  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  Negative mutation status  (IDH1/2, FLT3, NPM 1 and CEBPA). FISH negative.  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  Leukocytosis/anemia/thrombocytopenia all secondary to #1  -Transfuse if hemoglobin less than 7 g/dL or platelets less than 10,000,  irradiated, CMV negative blood products    3.  History of atrial fibrillation post cardioversion September 2021 on flecainide    4.  History of hypertension    5.  Covid infection in July 2021 with chronic sinus symptoms  -10/24 repeated Covid test negative by PCR     6.  Tumor lysis prophylaxis  -On allopurinol 300 mg daily    7.  Antibiotic prophylaxis with acyclovir and voriconazole    Plan  -Patient is doing fairly  well, day 4 after treatment today.  No major side effects  -Transfuse if hemoglobin less than 7 g/dL or platelets less than 10,000 or bleeding  -Discussed with the patient we may be able to discharge him sooner than the 4 weeks if he continues to be very stable and active during hospital stay  -Continue on allopurinol, daily labs for tumor lysis prevention    high complexity/complex decision making      Quality-Core Measures

## 2021-10-29 NOTE — PROGRESS NOTES
[facility] Medicine Daily Progress Note    Date of Service    10/25/21    Chief Complaint  Alfonso Gaspar is a 76 y.o. male admitted 10/18/2021 with leukocytosis.     Hospital Course  This is a 76 year old male with PMHx of atrial fibrillation (s/p cardioversion, off AC, Dr. Low), hypertension, hyperlipidemia, and gout who was admitted on 10/18/2021 due to abnormal labs. Patient was seen by cardiology, Dr. Low, patient had a cardioversion in September 2021, patient returned to [facility], patient taken off of Eliquis due to recurrent nosebleeds.  Patient is currently on flecainide.  Patient had 4-5 ER visits due to this recurrent epistaxis, requiring packing, cauterization, and surgical procedure performed by ENT.  Patient is currently off of any anticoagulation.    Patient was sent in by PCP due to findings of leukocytosis, anemia, and thrombocytopenia on repeat labs. Labs on admission noted hemoglobin of 7.4, patient was transfused 1 unit, hemoglobin remained at 7.3.  We will continue with serial hemoglobins and transfuse if less than 7. Oncology was consulted, they do recommend inpatient bone marrow biopsy, this was performed 10/19, by my colleague, Dr. Arthur Caruso.  Oncology following.    Interval Problem Update  10/26:  Initiated on vidaza and venetoclax today.  He denies acute pain, nausea, or vomiting.  His only complaint is nasal congestion which has been chronic since his recent episode epistaxis.  VSS.   10/27:  Patient developed severe bleed from BMB wound in setting of thrombocytopenia.  Ordered 4 units plts.  Pressure wound to be applied.  Follow hgb.  No current clinical evidence of hypovolemia.   10/28:  Active bleed resolved today.  He did require PRBC transfusion overnight, however hgb stable this morning.  He does complain of ongoing nasal congestion.  Trial Allegra.  Denies acute pain or discomfort.   10/29:  Fatigued from treatment.  Congestion improved today.  No other acute needs.      Consultants/Specialty  oncology    Code Status  Full Code    Disposition  Patient is not medically cleared.   Anticipate discharge to to home with close outpatient follow-up.  I have placed the appropriate orders for post-discharge needs.    Review of Systems  Review of Systems   Constitutional: Negative for chills, fever and malaise/fatigue.   HENT: Positive for congestion. Negative for sore throat.         Post nasal drip; hoarseness   Eyes: Negative for blurred vision and double vision.   Respiratory: Positive for cough. Negative for shortness of breath.    Cardiovascular: Negative for chest pain, palpitations and leg swelling.   Gastrointestinal: Negative for abdominal pain, constipation, diarrhea, heartburn, nausea and vomiting.   Genitourinary: Negative for dysuria.   Musculoskeletal: Negative for myalgias.   Skin: Negative for itching and rash.   Neurological: Negative for dizziness and focal weakness.   Endo/Heme/Allergies: Bruises/bleeds easily.   Psychiatric/Behavioral: Negative for depression.   All other systems reviewed and are negative.       Physical Exam  Temp:  [36.4 °C (97.6 °F)-36.6 °C (97.9 °F)] 36.6 °C (97.9 °F)  Pulse:  [89-96] 90  Resp:  [16-22] 16  BP: (100-119)/(56-62) 119/57  SpO2:  [94 %-95 %] 95 %    Physical Exam  Vitals and nursing note reviewed.   Constitutional:       General: He is not in acute distress.     Appearance: Normal appearance. He is not toxic-appearing.      Interventions: Nasal cannula in place.   HENT:      Head: Normocephalic and atraumatic.      Nose: Congestion present.      Mouth/Throat:      Mouth: Mucous membranes are dry.      Pharynx: No oropharyngeal exudate or posterior oropharyngeal erythema.   Eyes:      General:         Right eye: No discharge.         Left eye: No discharge.      Conjunctiva/sclera: Conjunctivae normal.      Pupils: Pupils are equal, round, and reactive to light.   Cardiovascular:      Rate and Rhythm: Normal rate and regular rhythm.       Pulses: Normal pulses.      Heart sounds: Normal heart sounds. No murmur heard.     Pulmonary:      Effort: Pulmonary effort is normal. No respiratory distress.      Breath sounds: Normal breath sounds. No wheezing.   Abdominal:      General: Bowel sounds are normal. There is no distension.      Palpations: Abdomen is soft.      Tenderness: There is no abdominal tenderness. There is no guarding.   Musculoskeletal:         General: Normal range of motion.      Cervical back: Neck supple. No rigidity or tenderness.   Skin:     General: Skin is warm and dry.      Coloration: Skin is not jaundiced or pale.      Comments: Bleeding wound from BMB site resolved.    Neurological:      General: No focal deficit present.      Mental Status: He is alert and oriented to person, place, and time.   Psychiatric:         Attention and Perception: Attention normal.         Mood and Affect: Mood normal.         Fluids  No intake or output data in the 24 hours ending 10/29/21 1102    Laboratory  Recent Labs     10/27/21  0005 10/27/21  0005 10/27/21  1950 10/28/21  0530 10/28/21  2342   WBC 30.6*  --   --  7.8 7.3   RBC 2.29*  --   --  2.33* 2.23*   HEMOGLOBIN 7.7*   < > 6.0* 7.7* 7.3*   HEMATOCRIT 23.5*  --   --  23.3* 22.6*   .6*  --   --  100.0* 101.3*   MCH 33.6*  --   --  33.0 32.7   MCHC 32.8*  --   --  33.0* 32.3*   RDW 83.2*  --   --  71.7* 78.3*   PLATELETCT 34*  --   --  49* 33*   MPV  --   --   --  12.0 11.2    < > = values in this interval not displayed.     Recent Labs     10/27/21  0005 10/28/21  0530 10/28/21  2342   SODIUM 132* 135 136   POTASSIUM 4.8 4.3 4.7   CHLORIDE 102 106 107   CO2 20 20 21   GLUCOSE 102* 85 104*   BUN 22 22 24*   CREATININE 0.61 0.55 0.73   CALCIUM 7.5* 7.4* 7.8*                   Imaging  DX-CHEST-PORTABLE (1 VIEW)   Final Result      Right PICC line placement with the tip projecting over the proximal right atrium.      IR-PICC LINE PLACEMENT W/ GUIDANCE > AGE 5   Final Result                   Ultrasound-guided PICC placement performed by qualified nursing staff as    above.               Assessment/Plan  * AML (acute myeloblastic leukemia) (HCC)  Assessment & Plan  Based on initial interpretation of BMB  10/21 PICC  10/26 initiated on venetoclax and vidaza for 7 day regimen.  Prophylactic antifungal and antiviral initiated.  Oncology following.  Follow labs closely.     Bleeding from wound  Assessment & Plan  Severe bleeding from BMB wound after removing bandage  Secondary to thrombocytopenia  Ordered 4 units of plts  Apply pressure wound  Follow hgb.  10/28:  Resolved this am.     Post-nasal drip  Assessment & Plan  W hoarseness and dry cough -- present since last COVID infection; likely post-infectous  Sudafed as needed and follow blood pressure  Humidified O2  Ocean mist nasal spray as needed  Antihistamine PRN  10/24: Repeat COVID PCR negative. CXR clear.  10/25 Mucinex  10/27:  Much improved with benadryl and robitussin.   10/28:  Worse today.  Trial allegra.   10/29:  Improving.    Hyponatremia  Assessment & Plan  Stable; monitor    Elevated LDH  Assessment & Plan  LDH baseline 347  Monitor following induction    Thrombocytopenia (HCC)- (present on admission)  Assessment & Plan  Tranfuse platelets prn  Likely secondary to marrow dysfunction  Daily CBC  10/27:  4 units plts ordered secondary to active bleed.     Leukocytosis- (present on admission)  Assessment & Plan  Likely secondary to Leukemia  S/p bone marrow biopsy 10/19/2021 by Dr. Arthur Caruso  Oncology consulted, Dr. ALEX Anguiano   Daily CBC w diff  Continue treatment per oncology      Atrial fibrillation (HCC)- (present on admission)  Assessment & Plan  Patient follows up with Dr. Low  Currently had a cardioversion in September 2021  As per cardiology no longer on anticoagulation  Patient to continue with flecainide  10/21: EKG - SR    Anemia- (present on admission)  Assessment & Plan  S/p bone marrow biopsy 10/19/2021 by Dr. Gibson  [place]  Oncology consulted, Dr. ALEX Anguiano   Required 1 unit PRBC on 10/18/2021  Monitor with serial hemoglobins, transfuse if hemoglobin is less than 7  10/21 Transfused; daily CBC w diff  Standing transfusion order placed by Onc  10/28:  Required blood transfusion overnight secondary to active bleed. Bleed has resolved.  Hgb stable this am.   10/29:  Hgb 7.3.  Continue to follow.      Essential hypertension, benign- (present on admission)  Assessment & Plan  Continue Home Medications  Labetalol ivp prn with parameters       VTE prophylaxis: SCDs/TEDs and pharmacologic prophylaxis contraindicated due to low platelets    I have performed a physical exam and reviewed and updated ROS and Plan today (10/29/2021). In review of yesterday's note (10/28/2021), there are no changes except as documented above.

## 2021-10-30 LAB
ALBUMIN SERPL BCP-MCNC: 2.3 G/DL (ref 3.2–4.9)
ALBUMIN/GLOB SERPL: 0.8 G/DL
ALP SERPL-CCNC: 121 U/L (ref 30–99)
ALT SERPL-CCNC: 25 U/L (ref 2–50)
ANION GAP SERPL CALC-SCNC: 8 MMOL/L (ref 7–16)
ANISOCYTOSIS BLD QL SMEAR: ABNORMAL
AST SERPL-CCNC: 51 U/L (ref 12–45)
BASOPHILS # BLD AUTO: 0 % (ref 0–1.8)
BASOPHILS # BLD: 0 K/UL (ref 0–0.12)
BILIRUB SERPL-MCNC: 0.5 MG/DL (ref 0.1–1.5)
BLASTS NFR BLD MANUAL: 9.8 %
BUN SERPL-MCNC: 27 MG/DL (ref 8–22)
BURR CELLS BLD QL SMEAR: NORMAL
CALCIUM SERPL-MCNC: 7.9 MG/DL (ref 8.5–10.5)
CHLORIDE SERPL-SCNC: 107 MMOL/L (ref 96–112)
CO2 SERPL-SCNC: 21 MMOL/L (ref 20–33)
CREAT SERPL-MCNC: 0.87 MG/DL (ref 0.5–1.4)
EOSINOPHIL # BLD AUTO: 0 K/UL (ref 0–0.51)
EOSINOPHIL NFR BLD: 0 % (ref 0–6.9)
ERYTHROCYTE [DISTWIDTH] IN BLOOD BY AUTOMATED COUNT: 77.3 FL (ref 35.9–50)
GLOBULIN SER CALC-MCNC: 2.8 G/DL (ref 1.9–3.5)
GLUCOSE SERPL-MCNC: 109 MG/DL (ref 65–99)
HCT VFR BLD AUTO: 21.7 % (ref 42–52)
HGB BLD-MCNC: 7.1 G/DL (ref 14–18)
LYMPHOCYTES # BLD AUTO: 2.99 K/UL (ref 1–4.8)
LYMPHOCYTES NFR BLD: 59.8 % (ref 22–41)
MACROCYTES BLD QL SMEAR: ABNORMAL
MAGNESIUM SERPL-MCNC: 1.9 MG/DL (ref 1.5–2.5)
MANUAL DIFF BLD: NORMAL
MCH RBC QN AUTO: 33.2 PG (ref 27–33)
MCHC RBC AUTO-ENTMCNC: 32.7 G/DL (ref 33.7–35.3)
MCV RBC AUTO: 101.4 FL (ref 81.4–97.8)
MICROCYTES BLD QL SMEAR: ABNORMAL
MONOCYTES # BLD AUTO: 0.09 K/UL (ref 0–0.85)
MONOCYTES NFR BLD AUTO: 1.8 % (ref 0–13.4)
MORPHOLOGY BLD-IMP: NORMAL
NEUTROPHILS # BLD AUTO: 1.43 K/UL (ref 1.82–7.42)
NEUTROPHILS NFR BLD: 26.8 % (ref 44–72)
NEUTS BAND NFR BLD MANUAL: 1.8 % (ref 0–10)
NRBC # BLD AUTO: 0.55 K/UL
NRBC BLD-RTO: 11 /100 WBC
OVALOCYTES BLD QL SMEAR: NORMAL
PHOSPHATE SERPL-MCNC: 4.3 MG/DL (ref 2.5–4.5)
PLATELET # BLD AUTO: 22 K/UL (ref 164–446)
PLATELET BLD QL SMEAR: NORMAL
PLATELETS.RETICULATED NFR BLD AUTO: 21.2 K/UL (ref 0.6–13.1)
PMV BLD AUTO: 13.3 FL (ref 9–12.9)
POIKILOCYTOSIS BLD QL SMEAR: NORMAL
POTASSIUM SERPL-SCNC: 4.9 MMOL/L (ref 3.6–5.5)
PROT SERPL-MCNC: 5.1 G/DL (ref 6–8.2)
RBC # BLD AUTO: 2.14 M/UL (ref 4.7–6.1)
RBC BLD AUTO: PRESENT
SCHISTOCYTES BLD QL SMEAR: NORMAL
SMUDGE CELLS BLD QL SMEAR: NORMAL
SODIUM SERPL-SCNC: 136 MMOL/L (ref 135–145)
TARGETS BLD QL SMEAR: NORMAL
URATE SERPL-MCNC: 5.2 MG/DL (ref 2.5–8.3)
WBC # BLD AUTO: 5 K/UL (ref 4.8–10.8)

## 2021-10-30 PROCEDURE — 700102 HCHG RX REV CODE 250 W/ 637 OVERRIDE(OP): Performed by: GENERAL PRACTICE

## 2021-10-30 PROCEDURE — 85027 COMPLETE CBC AUTOMATED: CPT

## 2021-10-30 PROCEDURE — 84550 ASSAY OF BLOOD/URIC ACID: CPT

## 2021-10-30 PROCEDURE — 85055 RETICULATED PLATELET ASSAY: CPT

## 2021-10-30 PROCEDURE — 80053 COMPREHEN METABOLIC PANEL: CPT

## 2021-10-30 PROCEDURE — A9270 NON-COVERED ITEM OR SERVICE: HCPCS | Performed by: STUDENT IN AN ORGANIZED HEALTH CARE EDUCATION/TRAINING PROGRAM

## 2021-10-30 PROCEDURE — 700111 HCHG RX REV CODE 636 W/ 250 OVERRIDE (IP): Performed by: INTERNAL MEDICINE

## 2021-10-30 PROCEDURE — 700105 HCHG RX REV CODE 258: Performed by: INTERNAL MEDICINE

## 2021-10-30 PROCEDURE — A9270 NON-COVERED ITEM OR SERVICE: HCPCS | Performed by: NURSE PRACTITIONER

## 2021-10-30 PROCEDURE — A9270 NON-COVERED ITEM OR SERVICE: HCPCS | Performed by: INTERNAL MEDICINE

## 2021-10-30 PROCEDURE — 83735 ASSAY OF MAGNESIUM: CPT

## 2021-10-30 PROCEDURE — 700102 HCHG RX REV CODE 250 W/ 637 OVERRIDE(OP): Performed by: STUDENT IN AN ORGANIZED HEALTH CARE EDUCATION/TRAINING PROGRAM

## 2021-10-30 PROCEDURE — 700102 HCHG RX REV CODE 250 W/ 637 OVERRIDE(OP): Performed by: INTERNAL MEDICINE

## 2021-10-30 PROCEDURE — 700102 HCHG RX REV CODE 250 W/ 637 OVERRIDE(OP): Performed by: NURSE PRACTITIONER

## 2021-10-30 PROCEDURE — 84100 ASSAY OF PHOSPHORUS: CPT

## 2021-10-30 PROCEDURE — 770004 HCHG ROOM/CARE - ONCOLOGY PRIVATE *

## 2021-10-30 PROCEDURE — A9270 NON-COVERED ITEM OR SERVICE: HCPCS | Performed by: GENERAL PRACTICE

## 2021-10-30 PROCEDURE — 99233 SBSQ HOSP IP/OBS HIGH 50: CPT | Performed by: NURSE PRACTITIONER

## 2021-10-30 PROCEDURE — 85007 BL SMEAR W/DIFF WBC COUNT: CPT

## 2021-10-30 RX ORDER — ONDANSETRON 2 MG/ML
8 INJECTION INTRAMUSCULAR; INTRAVENOUS ONCE
Status: COMPLETED | OUTPATIENT
Start: 2021-10-30 | End: 2021-10-30

## 2021-10-30 RX ADMIN — GUAIFENESIN 600 MG: 600 TABLET, EXTENDED RELEASE ORAL at 05:07

## 2021-10-30 RX ADMIN — FLECAINIDE ACETATE 50 MG: 50 TABLET ORAL at 17:01

## 2021-10-30 RX ADMIN — FEXOFENADINE HCL 60 MG: 60 TABLET, FILM COATED ORAL at 05:08

## 2021-10-30 RX ADMIN — FERROUS SULFATE TAB 325 MG (65 MG ELEMENTAL FE) 325 MG: 325 (65 FE) TAB at 05:08

## 2021-10-30 RX ADMIN — ACYCLOVIR 400 MG: 200 CAPSULE ORAL at 05:07

## 2021-10-30 RX ADMIN — ALBUTEROL SULFATE 2 PUFF: 90 AEROSOL, METERED RESPIRATORY (INHALATION) at 23:05

## 2021-10-30 RX ADMIN — VENETOCLAX 100 MG: 100 TABLET, FILM COATED ORAL at 05:08

## 2021-10-30 RX ADMIN — DIPHENHYDRAMINE HYDROCHLORIDE 25 MG: 25 TABLET ORAL at 00:57

## 2021-10-30 RX ADMIN — ALBUTEROL SULFATE 2 PUFF: 90 AEROSOL, METERED RESPIRATORY (INHALATION) at 00:56

## 2021-10-30 RX ADMIN — ALLOPURINOL 300 MG: 300 TABLET ORAL at 05:08

## 2021-10-30 RX ADMIN — TRAZODONE HYDROCHLORIDE 50 MG: 100 TABLET ORAL at 00:57

## 2021-10-30 RX ADMIN — ALBUTEROL SULFATE 2 PUFF: 90 AEROSOL, METERED RESPIRATORY (INHALATION) at 14:47

## 2021-10-30 RX ADMIN — ALBUTEROL SULFATE 2 PUFF: 90 AEROSOL, METERED RESPIRATORY (INHALATION) at 05:10

## 2021-10-30 RX ADMIN — VORICONAZOLE 200 MG: 200 TABLET ORAL at 05:07

## 2021-10-30 RX ADMIN — AZACITIDINE 149.3 MG: 100 INJECTION, POWDER, LYOPHILIZED, FOR SOLUTION SUBCUTANEOUS at 14:26

## 2021-10-30 RX ADMIN — ALBUTEROL SULFATE 2 PUFF: 90 AEROSOL, METERED RESPIRATORY (INHALATION) at 10:23

## 2021-10-30 RX ADMIN — Medication 1 TABLET: at 05:07

## 2021-10-30 RX ADMIN — ACYCLOVIR 400 MG: 200 CAPSULE ORAL at 17:01

## 2021-10-30 RX ADMIN — ONDANSETRON 8 MG: 2 INJECTION INTRAMUSCULAR; INTRAVENOUS at 14:15

## 2021-10-30 RX ADMIN — GUAIFENESIN 600 MG: 600 TABLET, EXTENDED RELEASE ORAL at 17:01

## 2021-10-30 RX ADMIN — FLECAINIDE ACETATE 50 MG: 50 TABLET ORAL at 05:08

## 2021-10-30 RX ADMIN — Medication 2000 UNITS: at 05:07

## 2021-10-30 RX ADMIN — DOCUSATE SODIUM 50 MG AND SENNOSIDES 8.6 MG 2 TABLET: 8.6; 5 TABLET, FILM COATED ORAL at 17:01

## 2021-10-30 RX ADMIN — VORICONAZOLE 200 MG: 200 TABLET ORAL at 17:01

## 2021-10-30 RX ADMIN — ALBUTEROL SULFATE 2 PUFF: 90 AEROSOL, METERED RESPIRATORY (INHALATION) at 19:49

## 2021-10-30 ASSESSMENT — ENCOUNTER SYMPTOMS
DIZZINESS: 0
SORE THROAT: 0
SPUTUM PRODUCTION: 0
ABDOMINAL PAIN: 0
COUGH: 0
ORTHOPNEA: 0
HEMOPTYSIS: 0
FEVER: 0
BLURRED VISION: 0
BRUISES/BLEEDS EASILY: 1
NAUSEA: 0
CHILLS: 0
PHOTOPHOBIA: 0
HEADACHES: 0
BRUISES/BLEEDS EASILY: 0
DEPRESSION: 0
MYALGIAS: 0
CONSTIPATION: 0
WEIGHT LOSS: 0
HEARTBURN: 0
PALPITATIONS: 0
DIARRHEA: 0
VOMITING: 0
SHORTNESS OF BREATH: 0
DOUBLE VISION: 0
COUGH: 1
FOCAL WEAKNESS: 0

## 2021-10-30 ASSESSMENT — FIBROSIS 4 INDEX: FIB4 SCORE: 35.7

## 2021-10-30 ASSESSMENT — LIFESTYLE VARIABLES: SUBSTANCE_ABUSE: 0

## 2021-10-30 ASSESSMENT — PAIN DESCRIPTION - PAIN TYPE: TYPE: ACUTE PAIN

## 2021-10-30 NOTE — PROGRESS NOTES
Chemotherapy Verification - SECONDARY RN       Height = 177.8 cm  Weight = 80.2 kg  BSA = 1.99 m2       Medication: Azacitidine  Dose: 75 mg/m2  Calculated Dose: 149.25 mg                      Ordered dose: 149.3 mg       (In mg/m2, AUC, mg/kg)       I confirm that this process was performed independently.

## 2021-10-30 NOTE — CARE PLAN
Problem: Fall Risk  Goal: Patient will remain free from falls  Outcome: Progressing     Problem: Self Care  Goal: Patient will have the ability to perform ADLs independently or with assistance (bathe, groom, dress, toilet and feed)  Outcome: Progressing     The patient is Watcher - Medium risk of patient condition declining or worsening    Shift Goals  Clinical Goals: Day 4 chemo  Patient Goals: Rest; no bleeding from biopsy site    Progress made toward(s) clinical / shift goals:  Pt educated on fall precautions    Patient is not progressing towards the following goals:

## 2021-10-30 NOTE — PROGRESS NOTES
"Pharmacy Chemotherapy Verification    Patient Name: Alfonso Gaspar   Dx: AML    Protocol: AzaCITIDine + venetoclax     *Dosing Reference*  AzaCITIDine 75 mg/m2 IV over 10-40 minutes daily on Days 1-7  Venetoclax dose titration for voriconazole drug interaction              -10 mg PO on Day 1 followed by              -20 mg PO on Day 2 followed by               -50 mg PO on Day 3 followed by               -100 mg PO daily on Days 4-28  28 day cycle for 1 cycle  NCCN Guidelines for Acute Myeloid Leukemia.V.3.2020  Aron MYLES et al. Lancet Oncol. 2018;19(2):216-228  Aron MYLES et al. Blood. 2019;133(1):7-17    Allergies:  Pcn [penicillins]     /52   Pulse 92   Temp (!) 35.2 °C (95.4 °F) (Temporal)   Resp 18   Ht 1.778 m (5' 10\")   Wt 80.2 kg (176 lb 12.9 oz)   SpO2 94%   BMI 25.37 kg/m²  Body surface area is 1.99 meters squared.    Labs 10/29/21  ANC 1180 Hgb 7.3 Plt 33k  SCr 0.73 CrCl 97 mL/min   AST/ALT/AP = 56/28/119 Tbili = 0.6  Uric Acid 4.8     **MD aware of labs, OK to proceed with treatment as outlined below.Transfusion parameters in place for Hgb <7 and platelets <10k.**      Drug Order   (Drug name, dose, route, IV Fluid & volume, frequency, number of doses) Cycle 1, Day 5 of 7  Previous treatment: n/a   Medication = azaCITIDtine (Vidaza)  Base Dose = 75 mg/m2   Calc Dose: Base Dose x 1.99 m2 = 149.3 mg  Final Dose = 149.3 mg  Route = IV  Fluid & Volume =  mL  Admin Duration = Over 10-40 minutes          <10% difference, OK to treat with final dose      By my signature below, I confirm this process was performed independently with the BSA and all final chemotherapy dosing calculations congruent. I have reviewed the above chemotherapy order and that my calculation of the final dose and BSA (when applicable) corroborate those calculations of the  pharmacist. Discrepancies of 10% or greater in the written dose have been addressed and documented within the EPIC Progress " notes.      Jeanne Root, PharmD

## 2021-10-30 NOTE — PROGRESS NOTES
[facility] Medicine Daily Progress Note    Date of Service    10/25/21    Chief Complaint  Alfonso Gaspar is a 76 y.o. male admitted 10/18/2021 with leukocytosis.     Hospital Course  This is a 76 year old male with PMHx of atrial fibrillation (s/p cardioversion, off AC, Dr. Low), hypertension, hyperlipidemia, and gout who was admitted on 10/18/2021 due to abnormal labs. Patient was seen by cardiology, Dr. Low, patient had a cardioversion in September 2021, patient returned to [facility], patient taken off of Eliquis due to recurrent nosebleeds.  Patient is currently on flecainide.  Patient had 4-5 ER visits due to this recurrent epistaxis, requiring packing, cauterization, and surgical procedure performed by ENT.  Patient is currently off of any anticoagulation.    Patient was sent in by PCP due to findings of leukocytosis, anemia, and thrombocytopenia on repeat labs. Labs on admission noted hemoglobin of 7.4, patient was transfused 1 unit, hemoglobin remained at 7.3.  We will continue with serial hemoglobins and transfuse if less than 7. Oncology was consulted, they do recommend inpatient bone marrow biopsy, this was performed 10/19, by my colleague, Dr. Arthur Caruso.  Oncology following.    Interval Problem Update  10/26:  Initiated on vidaza and venetoclax today.  He denies acute pain, nausea, or vomiting.  His only complaint is nasal congestion which has been chronic since his recent episode epistaxis.  VSS.   10/27:  Patient developed severe bleed from BMB wound in setting of thrombocytopenia.  Ordered 4 units plts.  Pressure wound to be applied.  Follow hgb.  No current clinical evidence of hypovolemia.   10/28:  Active bleed resolved today.  He did require PRBC transfusion overnight, however hgb stable this morning.  He does complain of ongoing nasal congestion.  Trial Allegra.  Denies acute pain or discomfort.   10/29:  Fatigued from treatment.  Congestion improved today.  No other acute needs.   10/30:  Hgb 7.1  this am.  Transfuse if needed.  No active bleed. Congestion continues to improve.  No acute needs at this time.     Consultants/Specialty  oncology    Code Status  Full Code    Disposition  Patient is not medically cleared.   Anticipate discharge to to home with close outpatient follow-up.  I have placed the appropriate orders for post-discharge needs.    Review of Systems  Review of Systems   Constitutional: Negative for chills, fever and malaise/fatigue.   HENT: Positive for congestion. Negative for sore throat.         Post nasal drip; hoarseness   Eyes: Negative for blurred vision and double vision.   Respiratory: Positive for cough. Negative for shortness of breath.    Cardiovascular: Negative for chest pain, palpitations and leg swelling.   Gastrointestinal: Negative for abdominal pain, constipation, diarrhea, heartburn, nausea and vomiting.   Genitourinary: Negative for dysuria.   Musculoskeletal: Negative for myalgias.   Skin: Negative for itching and rash.   Neurological: Negative for dizziness and focal weakness.   Endo/Heme/Allergies: Bruises/bleeds easily.   Psychiatric/Behavioral: Negative for depression.   All other systems reviewed and are negative.       Physical Exam  Temp:  [35.2 °C (95.4 °F)-36.7 °C (98.1 °F)] 35.2 °C (95.4 °F)  Pulse:  [89-99] 92  Resp:  [17-18] 18  BP: ()/(50-56) 110/52  SpO2:  [93 %-95 %] 94 %    Physical Exam  Vitals and nursing note reviewed.   Constitutional:       General: He is not in acute distress.     Appearance: Normal appearance. He is not toxic-appearing.      Interventions: Nasal cannula in place.   HENT:      Head: Normocephalic and atraumatic.      Nose: Congestion present.      Mouth/Throat:      Mouth: Mucous membranes are dry.      Pharynx: No oropharyngeal exudate or posterior oropharyngeal erythema.   Eyes:      General:         Right eye: No discharge.         Left eye: No discharge.      Conjunctiva/sclera: Conjunctivae normal.      Pupils: Pupils are  equal, round, and reactive to light.   Cardiovascular:      Rate and Rhythm: Normal rate and regular rhythm.      Pulses: Normal pulses.      Heart sounds: Normal heart sounds. No murmur heard.     Pulmonary:      Effort: Pulmonary effort is normal. No respiratory distress.      Breath sounds: Normal breath sounds. No wheezing.   Abdominal:      General: Bowel sounds are normal. There is no distension.      Palpations: Abdomen is soft.      Tenderness: There is no abdominal tenderness. There is no guarding.   Musculoskeletal:         General: Normal range of motion.      Cervical back: Neck supple. No rigidity or tenderness.   Skin:     General: Skin is warm and dry.      Coloration: Skin is not jaundiced or pale.      Comments: Bleeding wound from BMB site resolved.    Neurological:      General: No focal deficit present.      Mental Status: He is alert and oriented to person, place, and time.   Psychiatric:         Attention and Perception: Attention normal.         Mood and Affect: Mood normal.         Fluids    Intake/Output Summary (Last 24 hours) at 10/30/2021 1128  Last data filed at 10/30/2021 0518  Gross per 24 hour   Intake --   Output 700 ml   Net -700 ml       Laboratory  Recent Labs     10/28/21  0530 10/28/21  2342 10/30/21  0055   WBC 7.8 7.3 5.0   RBC 2.33* 2.23* 2.14*   HEMOGLOBIN 7.7* 7.3* 7.1*   HEMATOCRIT 23.3* 22.6* 21.7*   .0* 101.3* 101.4*   MCH 33.0 32.7 33.2*   MCHC 33.0* 32.3* 32.7*   RDW 71.7* 78.3* 77.3*   PLATELETCT 49* 33* 22*   MPV 12.0 11.2 13.3*     Recent Labs     10/28/21  0530 10/28/21  2342 10/30/21  0055   SODIUM 135 136 136   POTASSIUM 4.3 4.7 4.9   CHLORIDE 106 107 107   CO2 20 21 21   GLUCOSE 85 104* 109*   BUN 22 24* 27*   CREATININE 0.55 0.73 0.87   CALCIUM 7.4* 7.8* 7.9*                   Imaging  DX-CHEST-PORTABLE (1 VIEW)   Final Result      Right PICC line placement with the tip projecting over the proximal right atrium.      IR-PICC LINE PLACEMENT W/ GUIDANCE >  AGE 5   Final Result                  Ultrasound-guided PICC placement performed by qualified nursing staff as    above.               Assessment/Plan  * AML (acute myeloblastic leukemia) (HCC)  Assessment & Plan  Based on initial interpretation of BMB  10/21 PICC  10/26 initiated on venetoclax and vidaza for 7 day regimen.  Prophylactic antifungal and antiviral initiated.  Oncology following.  Follow labs closely.     Bleeding from wound  Assessment & Plan  Severe bleeding from BMB wound after removing bandage  Secondary to thrombocytopenia  Ordered 4 units of plts  Apply pressure wound  Follow hgb.  10/28:  Resolved this am.     Post-nasal drip  Assessment & Plan  W hoarseness and dry cough -- present since last COVID infection; likely post-infectous  Sudafed as needed and follow blood pressure  Humidified O2  Ocean mist nasal spray as needed  Antihistamine PRN  10/24: Repeat COVID PCR negative. CXR clear.  10/25 Mucinex  10/27:  Much improved with benadryl and robitussin.   10/28:  Worse today.  Trial allegra.   10/29:  Improving.    Hyponatremia  Assessment & Plan  Stable; monitor    Elevated LDH  Assessment & Plan  LDH baseline 347  Monitor following induction    Thrombocytopenia (HCC)- (present on admission)  Assessment & Plan  Tranfuse platelets prn  Likely secondary to marrow dysfunction  Daily CBC  10/27:  4 units plts ordered secondary to active bleed.     Leukocytosis- (present on admission)  Assessment & Plan  Likely secondary to Leukemia  S/p bone marrow biopsy 10/19/2021 by Dr. Arthur Caruso  Oncology consulted, Dr. ALEX Anguiano   Daily CBC w diff  Continue treatment per oncology      Atrial fibrillation (HCC)- (present on admission)  Assessment & Plan  Patient follows up with Dr. Low  Currently had a cardioversion in September 2021  As per cardiology no longer on anticoagulation  Patient to continue with flecainide  10/21: EKG - SR    Anemia- (present on admission)  Assessment & Plan  S/p bone marrow  biopsy 10/19/2021 by Dr. Arthur Caruso  Oncology consulted, Dr. ALEX Anguiano   Required 1 unit PRBC on 10/18/2021  Monitor with serial hemoglobins, transfuse if hemoglobin is less than 7  10/21 Transfused; daily CBC w diff  Standing transfusion order placed by Onc  10/28:  Required blood transfusion overnight secondary to active bleed. Bleed has resolved.  Hgb stable this am.   10/29:  Hgb 7.3.  Continue to follow.    10/30:  Hgb 7.1.  Transfuse if needed.     Essential hypertension, benign- (present on admission)  Assessment & Plan  Continue Home Medications  Labetalol ivp prn with parameters       VTE prophylaxis: SCDs/TEDs and pharmacologic prophylaxis contraindicated due to low platelets    I have performed a physical exam and reviewed and updated ROS and Plan today (10/30/2021). In review of yesterday's note (10/29/2021), there are no changes except as documented above.

## 2021-10-30 NOTE — PROGRESS NOTES
Chemotherapy Verification - PRIMARY RN      Height = 177.8cm  Weight = 80.2kg  BSA = 1.99m2       Medication: Azacitidine  Dose: 75mg/m2  Calculated Dose: 149.25mg Ordered Dose: 149.3mg  <10% difference                             (In mg/m2, AUC, mg/kg)           I confirm this process was performed independently with the BSA and all final chemotherapy dosing calculations congruent.  Any discrepancies of 10% or greater have been addressed with the chemotherapy pharmacist. The resolution of the discrepancy has been documented in the EPIC progress notes.

## 2021-10-30 NOTE — PROGRESS NOTES
Oncology/Hematology Progress Note               Author: Chau Marquis M.D. Date & Time created: 10/30/2021  11:16 AM     CC: Pancytopenia/leukemia  Interval History:  No events overnight, tolerating treatment fairly well, CBC counts dropping slowly.  There is no fever, chills.  He is asymptomatic, he is sitting in chair comfortably.  No shortness of breath, chest pain, cough, he looks great on physical exam    PMHx, PSurgHx, SocHx, FAMHx: Reviewed and unchanged    Review of Systems:  Review of Systems   Constitutional: Positive for malaise/fatigue. Negative for chills, fever and weight loss.   HENT: Negative for congestion, ear discharge, ear pain, hearing loss, nosebleeds and tinnitus.    Eyes: Negative for blurred vision, double vision and photophobia.   Respiratory: Negative for cough, hemoptysis, sputum production and shortness of breath.    Cardiovascular: Negative for chest pain, palpitations and orthopnea.   Gastrointestinal: Negative for abdominal pain, heartburn, nausea and vomiting.   Genitourinary: Negative for dysuria and urgency.   Skin: Negative for rash.   Neurological: Negative for dizziness and headaches.   Endo/Heme/Allergies: Negative for environmental allergies. Does not bruise/bleed easily.   Psychiatric/Behavioral: Negative for depression, substance abuse and suicidal ideas.       Physical Exam:  Physical Exam  Constitutional:       Appearance: Normal appearance.   HENT:      Head: Normocephalic and atraumatic.      Nose: Nose normal.      Mouth/Throat:      Mouth: Mucous membranes are moist.   Eyes:      Extraocular Movements: Extraocular movements intact.      Conjunctiva/sclera: Conjunctivae normal.   Cardiovascular:      Rate and Rhythm: Normal rate and regular rhythm.      Pulses: Normal pulses.      Heart sounds: Normal heart sounds.   Pulmonary:      Effort: Pulmonary effort is normal.      Breath sounds: Normal breath sounds.   Abdominal:      General: Abdomen is flat.    Musculoskeletal:         General: No swelling or tenderness.      Cervical back: Normal range of motion.   Neurological:      General: No focal deficit present.      Mental Status: He is alert and oriented to person, place, and time.   Psychiatric:         Mood and Affect: Mood normal.         Labs:          Recent Labs     10/28/21  0530 10/28/21  2342 10/30/21  005   SODIUM 135 136 136   POTASSIUM 4.3 4.7 4.9   CHLORIDE 106 107 107   CO2 20  21   BUN 22 24* 27*   CREATININE 0.55 0.73 0.87   MAGNESIUM 1.9 1.9 1.9   PHOSPHORUS 3.9 4.4 4.3   CALCIUM 7.4* 7.8* 7.9*     Recent Labs     10/28/21  0530 10/28/21  2342 10/30/21  0055   ALTSGPT    ASTSGOT 72* 56* 51*   ALKPHOSPHAT 115* 119* 121*   TBILIRUBIN 0.8 0.6 0.5   GLUCOSE 85 104* 109*     Recent Labs     10/28/21  0530 10/28/21  2342 10/30/21  0055   RBC 2.33* 2.23* 2.14*   HEMOGLOBIN 7.7* 7.3* 7.1*   HEMATOCRIT 23.3* 22.6* 21.7*   PLATELETCT 49* 33* 22*     Recent Labs     10/28/21  0530 10/28/21  2342 10/30/21  0055   WBC 7.8 7.3 5.0   NEUTSPOLYS 27.60* 16.20* 26.80*   LYMPHOCYTES 47.40* 70.30* 59.80*   MONOCYTES 8.60 7.20 1.80   EOSINOPHILS 0.00 0.00 0.00   BASOPHILS 1.70 0.00 0.00   ASTSGOT 72* 56* 51*   ALTSGPT    ALKPHOSPHAT 115* 119* 121*   TBILIRUBIN 0.8 0.6 0.5     Recent Labs     10/28/21  0530 10/28/21  2342 10/30/21  005   SODIUM 135 136 136   POTASSIUM 4.3 4.7 4.9   CHLORIDE 106 107 107   CO2  21   GLUCOSE 85 104* 109*   BUN 22 24* 27*   CREATININE 0.55 0.73 0.87   CALCIUM 7.4* 7.8* 7.9*     Hemodynamics:  Temp (24hrs), Av.3 °C (97.4 °F), Min:35.2 °C (95.4 °F), Max:36.7 °C (98.1 °F)  Temperature: (!) 35.2 °C (95.4 °F)  Pulse  Av.6  Min: 67  Max: 106   Blood Pressure : 110/52     Respiratory:    Respiration: 18, Pulse Oximetry: 94 %     Work Of Breathing / Effort: Mild  RUL Breath Sounds: Diminished, RML Breath Sounds: Diminished, RLL Breath Sounds: Diminished, JOE Breath Sounds: Diminished, LLL Breath Sounds:  Diminished  Fluids:    Intake/Output Summary (Last 24 hours) at 10/23/2021 1150  Last data filed at 10/23/2021 0900  Gross per 24 hour   Intake 1310 ml   Output --   Net 1310 ml     Weight: 80.2 kg (176 lb 12.9 oz)  GI/Nutrition:  Orders Placed This Encounter   Procedures   • Diet Order Diet: Cardiac     Standing Status:   Standing     Number of Occurrences:   1     Order Specific Question:   Diet:     Answer:   Cardiac [6]     Medical Decision Making, by Problem:  Active Hospital Problems    Diagnosis    • *AML (acute myeloblastic leukemia) (HCC) [C92.00]    • Post-nasal drip [R09.82]    • Elevated LDH [R74.02]    • Hyponatremia [E87.1]    • Leukocytosis [D72.829]    • Thrombocytopenia (HCC) [D69.6]    • Atrial fibrillation (HCC) [I48.91]    • Anemia [D64.9]    • Essential hypertension, benign [I10]      Procedures    PICC line right arm 10/22/2021    Impression  1.  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  Negative mutation status  (IDH1/2, FLT3, NPM 1 and CEBPA). FISH negative.  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  Leukocytosis/anemia/thrombocytopenia all secondary to #1  -Transfuse if hemoglobin less than 7 g/dL or platelets less than 10,000,  irradiated, CMV negative blood products    3.  History of atrial fibrillation post cardioversion September 2021 on flecainide    4.  History of hypertension    5.  Covid infection in July 2021 with chronic sinus symptoms  -10/24 repeated Covid test negative by PCR     6.  Tumor lysis prophylaxis  -On allopurinol 300 mg daily    7.  Antibiotic prophylaxis with acyclovir and voriconazole    Plan  -Day five of therapy today, patient tolerating treatment fairly well  -Continue checking daily labs  -Transfuse with parameters as stated above  -No signs of tumor  lysis  -Encourage patient to ambulate in hallway  -Continue with prophylactic antibiotics    high complexity/complex decision making      Quality-Core Measures

## 2021-10-31 LAB
ALBUMIN SERPL BCP-MCNC: 2.4 G/DL (ref 3.2–4.9)
ALBUMIN/GLOB SERPL: 0.9 G/DL
ALP SERPL-CCNC: 118 U/L (ref 30–99)
ALT SERPL-CCNC: 23 U/L (ref 2–50)
ANION GAP SERPL CALC-SCNC: 7 MMOL/L (ref 7–16)
ANISOCYTOSIS BLD QL SMEAR: ABNORMAL
AST SERPL-CCNC: 36 U/L (ref 12–45)
BASOPHILS # BLD AUTO: 0 % (ref 0–1.8)
BASOPHILS # BLD: 0 K/UL (ref 0–0.12)
BILIRUB SERPL-MCNC: 0.5 MG/DL (ref 0.1–1.5)
BLASTS NFR BLD MANUAL: 5.3 %
BUN SERPL-MCNC: 25 MG/DL (ref 8–22)
BURR CELLS BLD QL SMEAR: NORMAL
CALCIUM SERPL-MCNC: 7.8 MG/DL (ref 8.5–10.5)
CHLORIDE SERPL-SCNC: 105 MMOL/L (ref 96–112)
CO2 SERPL-SCNC: 21 MMOL/L (ref 20–33)
CREAT SERPL-MCNC: 0.8 MG/DL (ref 0.5–1.4)
EOSINOPHIL # BLD AUTO: 0 K/UL (ref 0–0.51)
EOSINOPHIL NFR BLD: 0 % (ref 0–6.9)
ERYTHROCYTE [DISTWIDTH] IN BLOOD BY AUTOMATED COUNT: 74.6 FL (ref 35.9–50)
GLOBULIN SER CALC-MCNC: 2.7 G/DL (ref 1.9–3.5)
GLUCOSE SERPL-MCNC: 97 MG/DL (ref 65–99)
HCT VFR BLD AUTO: 22.1 % (ref 42–52)
HGB BLD-MCNC: 7.3 G/DL (ref 14–18)
LYMPHOCYTES # BLD AUTO: 2.29 K/UL (ref 1–4.8)
LYMPHOCYTES NFR BLD: 50.9 % (ref 22–41)
MACROCYTES BLD QL SMEAR: ABNORMAL
MAGNESIUM SERPL-MCNC: 1.8 MG/DL (ref 1.5–2.5)
MANUAL DIFF BLD: NORMAL
MCH RBC QN AUTO: 33.2 PG (ref 27–33)
MCHC RBC AUTO-ENTMCNC: 33 G/DL (ref 33.7–35.3)
MCV RBC AUTO: 100.5 FL (ref 81.4–97.8)
MICROCYTES BLD QL SMEAR: ABNORMAL
MONOCYTES # BLD AUTO: 0.47 K/UL (ref 0–0.85)
MONOCYTES NFR BLD AUTO: 10.5 % (ref 0–13.4)
MORPHOLOGY BLD-IMP: NORMAL
NEUTROPHILS # BLD AUTO: 1.5 K/UL (ref 1.82–7.42)
NEUTROPHILS NFR BLD: 33.3 % (ref 44–72)
NRBC # BLD AUTO: 0.36 K/UL
NRBC BLD-RTO: 8.1 /100 WBC
OVALOCYTES BLD QL SMEAR: NORMAL
PHOSPHATE SERPL-MCNC: 4 MG/DL (ref 2.5–4.5)
PLATELET # BLD AUTO: 16 K/UL (ref 164–446)
PLATELET BLD QL SMEAR: NORMAL
PLATELETS.RETICULATED NFR BLD AUTO: 21.9 K/UL (ref 0.6–13.1)
PMV BLD AUTO: 11.5 FL (ref 9–12.9)
POIKILOCYTOSIS BLD QL SMEAR: NORMAL
POTASSIUM SERPL-SCNC: 4.6 MMOL/L (ref 3.6–5.5)
PROT SERPL-MCNC: 5.1 G/DL (ref 6–8.2)
RBC # BLD AUTO: 2.2 M/UL (ref 4.7–6.1)
RBC BLD AUTO: PRESENT
SMUDGE CELLS BLD QL SMEAR: NORMAL
SODIUM SERPL-SCNC: 133 MMOL/L (ref 135–145)
URATE SERPL-MCNC: 5.4 MG/DL (ref 2.5–8.3)
WBC # BLD AUTO: 4.5 K/UL (ref 4.8–10.8)

## 2021-10-31 PROCEDURE — 80053 COMPREHEN METABOLIC PANEL: CPT

## 2021-10-31 PROCEDURE — 700102 HCHG RX REV CODE 250 W/ 637 OVERRIDE(OP): Performed by: NURSE PRACTITIONER

## 2021-10-31 PROCEDURE — 700105 HCHG RX REV CODE 258: Performed by: INTERNAL MEDICINE

## 2021-10-31 PROCEDURE — A9270 NON-COVERED ITEM OR SERVICE: HCPCS | Performed by: NURSE PRACTITIONER

## 2021-10-31 PROCEDURE — 700111 HCHG RX REV CODE 636 W/ 250 OVERRIDE (IP): Mod: JG | Performed by: INTERNAL MEDICINE

## 2021-10-31 PROCEDURE — 83735 ASSAY OF MAGNESIUM: CPT

## 2021-10-31 PROCEDURE — 85055 RETICULATED PLATELET ASSAY: CPT

## 2021-10-31 PROCEDURE — 700102 HCHG RX REV CODE 250 W/ 637 OVERRIDE(OP): Performed by: INTERNAL MEDICINE

## 2021-10-31 PROCEDURE — 99231 SBSQ HOSP IP/OBS SF/LOW 25: CPT | Performed by: NURSE PRACTITIONER

## 2021-10-31 PROCEDURE — 770004 HCHG ROOM/CARE - ONCOLOGY PRIVATE *

## 2021-10-31 PROCEDURE — 84100 ASSAY OF PHOSPHORUS: CPT

## 2021-10-31 PROCEDURE — 84550 ASSAY OF BLOOD/URIC ACID: CPT

## 2021-10-31 PROCEDURE — A9270 NON-COVERED ITEM OR SERVICE: HCPCS | Performed by: INTERNAL MEDICINE

## 2021-10-31 PROCEDURE — A9270 NON-COVERED ITEM OR SERVICE: HCPCS | Performed by: GENERAL PRACTICE

## 2021-10-31 PROCEDURE — 85027 COMPLETE CBC AUTOMATED: CPT

## 2021-10-31 PROCEDURE — 700102 HCHG RX REV CODE 250 W/ 637 OVERRIDE(OP): Performed by: GENERAL PRACTICE

## 2021-10-31 PROCEDURE — 85007 BL SMEAR W/DIFF WBC COUNT: CPT

## 2021-10-31 RX ORDER — ONDANSETRON 2 MG/ML
8 INJECTION INTRAMUSCULAR; INTRAVENOUS ONCE
Status: COMPLETED | OUTPATIENT
Start: 2021-10-31 | End: 2021-10-31

## 2021-10-31 RX ADMIN — FERROUS SULFATE TAB 325 MG (65 MG ELEMENTAL FE) 325 MG: 325 (65 FE) TAB at 04:13

## 2021-10-31 RX ADMIN — ALBUTEROL SULFATE 2 PUFF: 90 AEROSOL, METERED RESPIRATORY (INHALATION) at 11:57

## 2021-10-31 RX ADMIN — VORICONAZOLE 200 MG: 200 TABLET ORAL at 04:13

## 2021-10-31 RX ADMIN — Medication 2000 UNITS: at 04:12

## 2021-10-31 RX ADMIN — GUAIFENESIN 600 MG: 600 TABLET, EXTENDED RELEASE ORAL at 18:42

## 2021-10-31 RX ADMIN — GUAIFENESIN 600 MG: 600 TABLET, EXTENDED RELEASE ORAL at 04:13

## 2021-10-31 RX ADMIN — ALBUTEROL SULFATE 2 PUFF: 90 AEROSOL, METERED RESPIRATORY (INHALATION) at 04:12

## 2021-10-31 RX ADMIN — ACYCLOVIR 400 MG: 200 CAPSULE ORAL at 04:12

## 2021-10-31 RX ADMIN — VORICONAZOLE 200 MG: 200 TABLET ORAL at 18:42

## 2021-10-31 RX ADMIN — FLECAINIDE ACETATE 50 MG: 50 TABLET ORAL at 18:43

## 2021-10-31 RX ADMIN — AZACITIDINE 149.3 MG: 100 INJECTION, POWDER, LYOPHILIZED, FOR SOLUTION INTRAVENOUS; SUBCUTANEOUS at 14:41

## 2021-10-31 RX ADMIN — VENETOCLAX 100 MG: 100 TABLET, FILM COATED ORAL at 04:14

## 2021-10-31 RX ADMIN — FLECAINIDE ACETATE 50 MG: 50 TABLET ORAL at 04:14

## 2021-10-31 RX ADMIN — ACYCLOVIR 400 MG: 200 CAPSULE ORAL at 18:42

## 2021-10-31 RX ADMIN — ALLOPURINOL 300 MG: 300 TABLET ORAL at 04:13

## 2021-10-31 RX ADMIN — Medication 1 TABLET: at 04:13

## 2021-10-31 RX ADMIN — FEXOFENADINE HCL 60 MG: 60 TABLET, FILM COATED ORAL at 04:14

## 2021-10-31 RX ADMIN — ALBUTEROL SULFATE 2 PUFF: 90 AEROSOL, METERED RESPIRATORY (INHALATION) at 14:51

## 2021-10-31 RX ADMIN — ALBUTEROL SULFATE 2 PUFF: 90 AEROSOL, METERED RESPIRATORY (INHALATION) at 18:42

## 2021-10-31 RX ADMIN — ONDANSETRON 8 MG: 2 INJECTION INTRAMUSCULAR; INTRAVENOUS at 14:10

## 2021-10-31 RX ADMIN — ALBUTEROL SULFATE 2 PUFF: 90 AEROSOL, METERED RESPIRATORY (INHALATION) at 23:07

## 2021-10-31 ASSESSMENT — ENCOUNTER SYMPTOMS
COUGH: 1
MYALGIAS: 0
ABDOMINAL PAIN: 0
BRUISES/BLEEDS EASILY: 1
CONSTIPATION: 0
DEPRESSION: 0
SORE THROAT: 0
PHOTOPHOBIA: 0
BRUISES/BLEEDS EASILY: 0
DIARRHEA: 0
PALPITATIONS: 0
CHILLS: 0
SPUTUM PRODUCTION: 0
ORTHOPNEA: 0
DIZZINESS: 0
NAUSEA: 0
FEVER: 0
BLURRED VISION: 0
HEADACHES: 0
FOCAL WEAKNESS: 0
VOMITING: 0
DOUBLE VISION: 0
SHORTNESS OF BREATH: 0
WEIGHT LOSS: 0
HEARTBURN: 0
HEMOPTYSIS: 0
COUGH: 0

## 2021-10-31 ASSESSMENT — LIFESTYLE VARIABLES: SUBSTANCE_ABUSE: 0

## 2021-10-31 ASSESSMENT — FIBROSIS 4 INDEX: FIB4 SCORE: 36.13

## 2021-10-31 ASSESSMENT — PAIN DESCRIPTION - PAIN TYPE: TYPE: ACUTE PAIN

## 2021-10-31 NOTE — PROGRESS NOTES
"Pharmacy Chemotherapy Verification    Patient Name: Alfonso Gaspar   Dx: AML    Protocol: AzaCITIDine + venetoclax     *Dosing Reference*  AzaCITIDine 75 mg/m2 IV over 10-40 minutes daily on Days 1-7  Venetoclax dose titration for voriconazole drug interaction              -10 mg PO on Day 1 followed by              -20 mg PO on Day 2 followed by               -50 mg PO on Day 3 followed by               -100 mg PO daily on Days 4-28  28 day cycle for 1 cycle  NCCN Guidelines for Acute Myeloid Leukemia.V.3.2020  Aron MYLES et al. Lancet Oncol. 2018;19(2):216-228  Aron YMLES et al. Blood. 2019;133(1):7-17    Allergies:  Pcn [penicillins]     /59   Pulse 85   Temp 36.9 °C (98.4 °F) (Temporal)   Resp 18   Ht 1.778 m (5' 10\")   Wt 80.2 kg (176 lb 12.9 oz)   SpO2 94%   BMI 25.37 kg/m²  Body surface area is 1.99 meters squared.    All lab results 10/31/21 reviewed.    **MD aware of labs, OK to proceed with treatment as outlined below.Transfusion parameters in place for Hgb <7 and platelets <10k.**    Drug Order   (Drug name, dose, route, IV Fluid & volume, frequency, number of doses) Cycle 1, Day 6 of 7  Previous treatment: n/a   Medication = azaCITIDtine (Vidaza)   Base Dose = 75 mg/m2   Calc Dose: Base Dose x 1.99 m2 = 149.3 mg  Final Dose = 149.3 mg  Route = IV  Fluid & Volume =  mL  Admin Duration = Over 10-40 minutes          <10% difference, OK to treat with final dose      By my signature below, I confirm this process was performed independently with the BSA and all final chemotherapy dosing calculations congruent. I have reviewed the above chemotherapy order and that my calculation of the final dose and BSA (when applicable) corroborate those calculations of the  pharmacist. Discrepancies of 10% or greater in the written dose have been addressed and documented within the [facility] Progress notes.      Jossy Sauceda, PharmD        "

## 2021-10-31 NOTE — PROGRESS NOTES
[facility] Medicine Daily Progress Note    Date of Service    10/25/21    Chief Complaint  Alfonso Gaspar is a 76 y.o. male admitted 10/18/2021 with leukocytosis.     Hospital Course  This is a 76 year old male with PMHx of atrial fibrillation (s/p cardioversion, off AC, Dr. Low), hypertension, hyperlipidemia, and gout who was admitted on 10/18/2021 due to abnormal labs. Patient was seen by cardiology, Dr. Low, patient had a cardioversion in September 2021, patient returned to [facility], patient taken off of Eliquis due to recurrent nosebleeds.  Patient is currently on flecainide.  Patient had 4-5 ER visits due to this recurrent epistaxis, requiring packing, cauterization, and surgical procedure performed by ENT.  Patient is currently off of any anticoagulation.    Patient was sent in by PCP due to findings of leukocytosis, anemia, and thrombocytopenia on repeat labs. Labs on admission noted hemoglobin of 7.4, patient was transfused 1 unit, hemoglobin remained at 7.3.  We will continue with serial hemoglobins and transfuse if less than 7. Oncology was consulted, they do recommend inpatient bone marrow biopsy, this was performed 10/19, by my colleague, Dr. Arthur Caruso.  Oncology following.    Interval Problem Update  10/26:  Initiated on vidaza and venetoclax today.  He denies acute pain, nausea, or vomiting.  His only complaint is nasal congestion which has been chronic since his recent episode epistaxis.  VSS.   10/27:  Patient developed severe bleed from BMB wound in setting of thrombocytopenia.  Ordered 4 units plts.  Pressure wound to be applied.  Follow hgb.  No current clinical evidence of hypovolemia.   10/28:  Active bleed resolved today.  He did require PRBC transfusion overnight, however hgb stable this morning.  He does complain of ongoing nasal congestion.  Trial Allegra.  Denies acute pain or discomfort.   10/29:  Fatigued from treatment.  Congestion improved today.  No other acute needs.   10/30:  Hgb 7.1  this am.  Transfuse if needed.  No active bleed. Congestion continues to improve.  No acute needs at this time.  10/31:  Plt 16.  Transfusion ordered.  No acute changes overnight.  Patient remains clinically stable.       Consultants/Specialty  oncology    Code Status  Full Code    Disposition  Patient is not medically cleared.   Anticipate discharge to to home with close outpatient follow-up.  I have placed the appropriate orders for post-discharge needs.    Review of Systems  Review of Systems   Constitutional: Negative for chills, fever and malaise/fatigue.   HENT: Positive for congestion. Negative for sore throat.         Post nasal drip; hoarseness   Eyes: Negative for blurred vision and double vision.   Respiratory: Positive for cough. Negative for shortness of breath.    Cardiovascular: Negative for chest pain, palpitations and leg swelling.   Gastrointestinal: Negative for abdominal pain, constipation, diarrhea, heartburn, nausea and vomiting.   Genitourinary: Negative for dysuria.   Musculoskeletal: Negative for myalgias.   Skin: Negative for itching and rash.   Neurological: Negative for dizziness and focal weakness.   Endo/Heme/Allergies: Bruises/bleeds easily.   Psychiatric/Behavioral: Negative for depression.   All other systems reviewed and are negative.       Physical Exam  Temp:  [36.6 °C (97.9 °F)-37.1 °C (98.7 °F)] 36.9 °C (98.4 °F)  Pulse:  [85-98] 85  Resp:  [18-19] 18  BP: ()/(56-59) 104/59  SpO2:  [91 %-96 %] 94 %    Physical Exam  Vitals and nursing note reviewed.   Constitutional:       General: He is not in acute distress.     Appearance: Normal appearance. He is not toxic-appearing.      Interventions: Nasal cannula in place.   HENT:      Head: Normocephalic and atraumatic.      Nose: Congestion present.      Mouth/Throat:      Mouth: Mucous membranes are dry.      Pharynx: No oropharyngeal exudate or posterior oropharyngeal erythema.   Eyes:      General:         Right eye: No  discharge.         Left eye: No discharge.      Conjunctiva/sclera: Conjunctivae normal.      Pupils: Pupils are equal, round, and reactive to light.   Cardiovascular:      Rate and Rhythm: Normal rate and regular rhythm.      Pulses: Normal pulses.      Heart sounds: Normal heart sounds. No murmur heard.     Pulmonary:      Effort: Pulmonary effort is normal. No respiratory distress.      Breath sounds: Normal breath sounds. No wheezing.   Abdominal:      General: Bowel sounds are normal. There is no distension.      Palpations: Abdomen is soft.      Tenderness: There is no abdominal tenderness. There is no guarding.   Musculoskeletal:         General: Normal range of motion.      Cervical back: Neck supple. No rigidity or tenderness.   Skin:     General: Skin is warm and dry.      Coloration: Skin is not jaundiced or pale.      Comments: Bleeding wound from BMB site resolved.    Neurological:      General: No focal deficit present.      Mental Status: He is alert and oriented to person, place, and time.   Psychiatric:         Attention and Perception: Attention normal.         Mood and Affect: Mood normal.         Fluids  No intake or output data in the 24 hours ending 10/31/21 0924    Laboratory  Recent Labs     10/28/21  2342 10/30/21  0055 10/31/21  0021   WBC 7.3 5.0 4.5*   RBC 2.23* 2.14* 2.20*   HEMOGLOBIN 7.3* 7.1* 7.3*   HEMATOCRIT 22.6* 21.7* 22.1*   .3* 101.4* 100.5*   MCH 32.7 33.2* 33.2*   MCHC 32.3* 32.7* 33.0*   RDW 78.3* 77.3* 74.6*   PLATELETCT 33* 22* 16*   MPV 11.2 13.3* 11.5     Recent Labs     10/28/21  2342 10/30/21  0055 10/31/21  0021   SODIUM 136 136 133*   POTASSIUM 4.7 4.9 4.6   CHLORIDE 107 107 105   CO2 21 21 21   GLUCOSE 104* 109* 97   BUN 24* 27* 25*   CREATININE 0.73 0.87 0.80   CALCIUM 7.8* 7.9* 7.8*                   Imaging  DX-CHEST-PORTABLE (1 VIEW)   Final Result      Right PICC line placement with the tip projecting over the proximal right atrium.      IR-PICC LINE  PLACEMENT W/ GUIDANCE > AGE 5   Final Result                  Ultrasound-guided PICC placement performed by qualified nursing staff as    above.               Assessment/Plan  * AML (acute myeloblastic leukemia) (HCC)  Assessment & Plan  Based on initial interpretation of BMB  10/21 PICC  10/26 initiated on venetoclax and vidaza for 7 day regimen.  Prophylactic antifungal and antiviral initiated.  Oncology following.  Follow labs closely.     Bleeding from wound  Assessment & Plan  Severe bleeding from BMB wound after removing bandage  Secondary to thrombocytopenia  Ordered 4 units of plts  Apply pressure wound  Follow hgb.  10/28:  Resolved this am.     Post-nasal drip  Assessment & Plan  W hoarseness and dry cough -- present since last COVID infection; likely post-infectous  Sudafed as needed and follow blood pressure  Humidified O2  Ocean mist nasal spray as needed  Antihistamine PRN  10/24: Repeat COVID PCR negative. CXR clear.  10/25 Mucinex  10/27:  Much improved with benadryl and robitussin.   10/28:  Worse today.  Trial allegra.   10/29:  Improving.    Hyponatremia  Assessment & Plan  Stable; monitor    Elevated LDH  Assessment & Plan  LDH baseline 347  Monitor following induction    Thrombocytopenia (HCC)- (present on admission)  Assessment & Plan  Tranfuse platelets prn  Likely secondary to marrow dysfunction  Daily CBC  10/27:  4 units plts ordered secondary to active bleed.   10/31:  Plt 16.  Transfuse.     Leukocytosis- (present on admission)  Assessment & Plan  Likely secondary to Leukemia  S/p bone marrow biopsy 10/19/2021 by Dr. Arthur Caruso  Oncology consulted, Dr. ALEX Anguiano   Daily CBC w diff  Continue treatment per oncology      Atrial fibrillation (HCC)- (present on admission)  Assessment & Plan  Patient follows up with Dr. Low  Currently had a cardioversion in September 2021  As per cardiology no longer on anticoagulation  Patient to continue with flecainide  10/21: EKG - SR    Anemia- (present  on admission)  Assessment & Plan  S/p bone marrow biopsy 10/19/2021 by Dr. Arthur Caruso  Oncology consulted, Dr. ALEX Anguiano   Required 1 unit PRBC on 10/18/2021  Monitor with serial hemoglobins, transfuse if hemoglobin is less than 7  10/21 Transfused; daily CBC w diff  Standing transfusion order placed by Onc  10/28:  Required blood transfusion overnight secondary to active bleed. Bleed has resolved.  Hgb stable this am.   10/29:  Hgb 7.3.  Continue to follow.    10/30:  Hgb 7.1.  Transfuse if needed.     Essential hypertension, benign- (present on admission)  Assessment & Plan  Continue Home Medications  Labetalol ivp prn with parameters       VTE prophylaxis: SCDs/TEDs and pharmacologic prophylaxis contraindicated due to low platelets    I have performed a physical exam and reviewed and updated ROS and Plan today (10/31/2021). In review of yesterday's note (10/30/2021), there are no changes except as documented above.

## 2021-10-31 NOTE — PROGRESS NOTES
Francine from Lab called with critical result of Platelets at 16. Critical lab result read back to Francine.   This critical lab result is within parameters established by Dr. Eng for this patient.

## 2021-10-31 NOTE — PROGRESS NOTES
Chemotherapy Verification - SECONDARY RN   Cycle 1, Day 6      Height = 177.8 cm  Weight = 80.2 kg  BSA = 1.99m2       Medication: Vidaza  Dose: 75 mg/m2  Calculated Dose: 149.25 mg dose ordered 149.3 mg.                              (In mg/m2, AUC, mg/kg)       I confirm that this process was performed independently.

## 2021-11-01 LAB
ABO GROUP BLD: ABNORMAL
ALBUMIN SERPL BCP-MCNC: 2.4 G/DL (ref 3.2–4.9)
ALBUMIN/GLOB SERPL: 0.9 G/DL
ALP SERPL-CCNC: 111 U/L (ref 30–99)
ALT SERPL-CCNC: 24 U/L (ref 2–50)
ANION GAP SERPL CALC-SCNC: 8 MMOL/L (ref 7–16)
ANISOCYTOSIS BLD QL SMEAR: ABNORMAL
AST SERPL-CCNC: 39 U/L (ref 12–45)
BARCODED ABORH UBTYP: 600
BARCODED ABORH UBTYP: 6200
BARCODED PRD CODE UBPRD: ABNORMAL
BARCODED PRD CODE UBPRD: ABNORMAL
BARCODED UNIT NUM UBUNT: ABNORMAL
BARCODED UNIT NUM UBUNT: ABNORMAL
BASOPHILS # BLD AUTO: 0.9 % (ref 0–1.8)
BASOPHILS # BLD: 0.04 K/UL (ref 0–0.12)
BILIRUB SERPL-MCNC: 0.4 MG/DL (ref 0.1–1.5)
BLASTS NFR BLD MANUAL: 6.4 %
BLD GP AB SCN SERPL QL: ABNORMAL
BUN SERPL-MCNC: 22 MG/DL (ref 8–22)
CALCIUM SERPL-MCNC: 7.9 MG/DL (ref 8.5–10.5)
CHLORIDE SERPL-SCNC: 104 MMOL/L (ref 96–112)
CO2 SERPL-SCNC: 22 MMOL/L (ref 20–33)
COMPONENT R 8504R: ABNORMAL
COMPONENT R 8504R: ABNORMAL
CREAT SERPL-MCNC: 0.96 MG/DL (ref 0.5–1.4)
EOSINOPHIL # BLD AUTO: 0 K/UL (ref 0–0.51)
EOSINOPHIL NFR BLD: 0 % (ref 0–6.9)
ERYTHROCYTE [DISTWIDTH] IN BLOOD BY AUTOMATED COUNT: 75 FL (ref 35.9–50)
GLOBULIN SER CALC-MCNC: 2.7 G/DL (ref 1.9–3.5)
GLUCOSE SERPL-MCNC: 101 MG/DL (ref 65–99)
HCT VFR BLD AUTO: 18.4 % (ref 42–52)
HGB BLD-MCNC: 6.2 G/DL (ref 14–18)
HYPOCHROMIA BLD QL SMEAR: ABNORMAL
LYMPHOCYTES # BLD AUTO: 2.78 K/UL (ref 1–4.8)
LYMPHOCYTES NFR BLD: 66.1 % (ref 22–41)
MACROCYTES BLD QL SMEAR: ABNORMAL
MAGNESIUM SERPL-MCNC: 1.7 MG/DL (ref 1.5–2.5)
MANUAL DIFF BLD: NORMAL
MCH RBC QN AUTO: 33.5 PG (ref 27–33)
MCHC RBC AUTO-ENTMCNC: 33.7 G/DL (ref 33.7–35.3)
MCV RBC AUTO: 99.5 FL (ref 81.4–97.8)
MICROCYTES BLD QL SMEAR: ABNORMAL
MONOCYTES # BLD AUTO: 0.16 K/UL (ref 0–0.85)
MONOCYTES NFR BLD AUTO: 3.7 % (ref 0–13.4)
MORPHOLOGY BLD-IMP: NORMAL
NEUTROPHILS # BLD AUTO: 0.96 K/UL (ref 1.82–7.42)
NEUTROPHILS NFR BLD: 22.9 % (ref 44–72)
NRBC # BLD AUTO: 0.27 K/UL
NRBC BLD-RTO: 6.4 /100 WBC
PHOSPHATE SERPL-MCNC: 3.6 MG/DL (ref 2.5–4.5)
PLATELET # BLD AUTO: 13 K/UL (ref 164–446)
PLATELET BLD QL SMEAR: NORMAL
PLATELETS.RETICULATED NFR BLD AUTO: 25 K/UL (ref 0.6–13.1)
POTASSIUM SERPL-SCNC: 4.4 MMOL/L (ref 3.6–5.5)
PRODUCT TYPE UPROD: ABNORMAL
PRODUCT TYPE UPROD: ABNORMAL
PROT SERPL-MCNC: 5.1 G/DL (ref 6–8.2)
RBC # BLD AUTO: 1.85 M/UL (ref 4.7–6.1)
RBC BLD AUTO: PRESENT
RH BLD: ABNORMAL
SMUDGE CELLS BLD QL SMEAR: NORMAL
SODIUM SERPL-SCNC: 134 MMOL/L (ref 135–145)
UNIT STATUS USTAT: ABNORMAL
UNIT STATUS USTAT: ABNORMAL
URATE SERPL-MCNC: 5.2 MG/DL (ref 2.5–8.3)
WBC # BLD AUTO: 4.2 K/UL (ref 4.8–10.8)

## 2021-11-01 PROCEDURE — 86901 BLOOD TYPING SEROLOGIC RH(D): CPT

## 2021-11-01 PROCEDURE — A9270 NON-COVERED ITEM OR SERVICE: HCPCS | Performed by: GENERAL PRACTICE

## 2021-11-01 PROCEDURE — A9270 NON-COVERED ITEM OR SERVICE: HCPCS | Performed by: INTERNAL MEDICINE

## 2021-11-01 PROCEDURE — 700102 HCHG RX REV CODE 250 W/ 637 OVERRIDE(OP): Performed by: INTERNAL MEDICINE

## 2021-11-01 PROCEDURE — 86850 RBC ANTIBODY SCREEN: CPT

## 2021-11-01 PROCEDURE — A9270 NON-COVERED ITEM OR SERVICE: HCPCS | Performed by: NURSE PRACTITIONER

## 2021-11-01 PROCEDURE — 86945 BLOOD PRODUCT/IRRADIATION: CPT

## 2021-11-01 PROCEDURE — 700102 HCHG RX REV CODE 250 W/ 637 OVERRIDE(OP): Performed by: STUDENT IN AN ORGANIZED HEALTH CARE EDUCATION/TRAINING PROGRAM

## 2021-11-01 PROCEDURE — 700111 HCHG RX REV CODE 636 W/ 250 OVERRIDE (IP): Mod: JG | Performed by: INTERNAL MEDICINE

## 2021-11-01 PROCEDURE — 700105 HCHG RX REV CODE 258: Performed by: INTERNAL MEDICINE

## 2021-11-01 PROCEDURE — 700102 HCHG RX REV CODE 250 W/ 637 OVERRIDE(OP): Performed by: NURSE PRACTITIONER

## 2021-11-01 PROCEDURE — 83735 ASSAY OF MAGNESIUM: CPT

## 2021-11-01 PROCEDURE — 770004 HCHG ROOM/CARE - ONCOLOGY PRIVATE *

## 2021-11-01 PROCEDURE — 99233 SBSQ HOSP IP/OBS HIGH 50: CPT | Performed by: NURSE PRACTITIONER

## 2021-11-01 PROCEDURE — 84550 ASSAY OF BLOOD/URIC ACID: CPT

## 2021-11-01 PROCEDURE — P9016 RBC LEUKOCYTES REDUCED: HCPCS

## 2021-11-01 PROCEDURE — 86900 BLOOD TYPING SEROLOGIC ABO: CPT

## 2021-11-01 PROCEDURE — 85007 BL SMEAR W/DIFF WBC COUNT: CPT

## 2021-11-01 PROCEDURE — 36430 TRANSFUSION BLD/BLD COMPNT: CPT

## 2021-11-01 PROCEDURE — 85055 RETICULATED PLATELET ASSAY: CPT

## 2021-11-01 PROCEDURE — 86923 COMPATIBILITY TEST ELECTRIC: CPT

## 2021-11-01 PROCEDURE — 85027 COMPLETE CBC AUTOMATED: CPT

## 2021-11-01 PROCEDURE — 84100 ASSAY OF PHOSPHORUS: CPT

## 2021-11-01 PROCEDURE — 700102 HCHG RX REV CODE 250 W/ 637 OVERRIDE(OP): Performed by: GENERAL PRACTICE

## 2021-11-01 PROCEDURE — A9270 NON-COVERED ITEM OR SERVICE: HCPCS | Performed by: STUDENT IN AN ORGANIZED HEALTH CARE EDUCATION/TRAINING PROGRAM

## 2021-11-01 PROCEDURE — 80053 COMPREHEN METABOLIC PANEL: CPT

## 2021-11-01 RX ORDER — ACETAMINOPHEN 325 MG/1
650 TABLET ORAL
Status: DISCONTINUED | OUTPATIENT
Start: 2021-11-01 | End: 2021-11-05 | Stop reason: HOSPADM

## 2021-11-01 RX ORDER — ONDANSETRON 2 MG/ML
8 INJECTION INTRAMUSCULAR; INTRAVENOUS ONCE
Status: COMPLETED | OUTPATIENT
Start: 2021-11-01 | End: 2021-11-01

## 2021-11-01 RX ADMIN — VORICONAZOLE 200 MG: 200 TABLET ORAL at 04:23

## 2021-11-01 RX ADMIN — ALBUTEROL SULFATE 2 PUFF: 90 AEROSOL, METERED RESPIRATORY (INHALATION) at 17:57

## 2021-11-01 RX ADMIN — ALLOPURINOL 300 MG: 300 TABLET ORAL at 04:23

## 2021-11-01 RX ADMIN — DOCUSATE SODIUM 50 MG AND SENNOSIDES 8.6 MG 2 TABLET: 8.6; 5 TABLET, FILM COATED ORAL at 04:23

## 2021-11-01 RX ADMIN — FLECAINIDE ACETATE 50 MG: 50 TABLET ORAL at 17:57

## 2021-11-01 RX ADMIN — ALBUTEROL SULFATE 2 PUFF: 90 AEROSOL, METERED RESPIRATORY (INHALATION) at 14:56

## 2021-11-01 RX ADMIN — ALBUTEROL SULFATE 2 PUFF: 90 AEROSOL, METERED RESPIRATORY (INHALATION) at 04:23

## 2021-11-01 RX ADMIN — Medication 1 TABLET: at 04:23

## 2021-11-01 RX ADMIN — FEXOFENADINE HCL 60 MG: 60 TABLET, FILM COATED ORAL at 04:24

## 2021-11-01 RX ADMIN — ALBUTEROL SULFATE 2 PUFF: 90 AEROSOL, METERED RESPIRATORY (INHALATION) at 08:04

## 2021-11-01 RX ADMIN — VENETOCLAX 100 MG: 100 TABLET, FILM COATED ORAL at 04:22

## 2021-11-01 RX ADMIN — Medication 2000 UNITS: at 04:23

## 2021-11-01 RX ADMIN — GUAIFENESIN 600 MG: 600 TABLET, EXTENDED RELEASE ORAL at 04:23

## 2021-11-01 RX ADMIN — ALBUTEROL SULFATE 2 PUFF: 90 AEROSOL, METERED RESPIRATORY (INHALATION) at 11:41

## 2021-11-01 RX ADMIN — ACYCLOVIR 400 MG: 200 CAPSULE ORAL at 17:56

## 2021-11-01 RX ADMIN — ONDANSETRON 8 MG: 2 INJECTION INTRAMUSCULAR; INTRAVENOUS at 14:14

## 2021-11-01 RX ADMIN — AZACITIDINE 148.5 MG: 100 INJECTION, POWDER, LYOPHILIZED, FOR SOLUTION INTRAVENOUS; SUBCUTANEOUS at 14:46

## 2021-11-01 RX ADMIN — ACYCLOVIR 400 MG: 200 CAPSULE ORAL at 04:23

## 2021-11-01 RX ADMIN — VORICONAZOLE 200 MG: 200 TABLET ORAL at 17:57

## 2021-11-01 RX ADMIN — GUAIFENESIN 600 MG: 600 TABLET, EXTENDED RELEASE ORAL at 17:56

## 2021-11-01 RX ADMIN — ALBUTEROL SULFATE 2 PUFF: 90 AEROSOL, METERED RESPIRATORY (INHALATION) at 22:37

## 2021-11-01 RX ADMIN — FLECAINIDE ACETATE 50 MG: 50 TABLET ORAL at 04:23

## 2021-11-01 RX ADMIN — FERROUS SULFATE TAB 325 MG (65 MG ELEMENTAL FE) 325 MG: 325 (65 FE) TAB at 04:24

## 2021-11-01 ASSESSMENT — ENCOUNTER SYMPTOMS
SHORTNESS OF BREATH: 0
SPUTUM PRODUCTION: 0
SORE THROAT: 0
BLURRED VISION: 0
BRUISES/BLEEDS EASILY: 0
COUGH: 0
MYALGIAS: 0
WEIGHT LOSS: 0
ABDOMINAL PAIN: 0
HEADACHES: 0
CHILLS: 0
BRUISES/BLEEDS EASILY: 1
FEVER: 0
DIZZINESS: 0
FOCAL WEAKNESS: 0
DIARRHEA: 0
NERVOUS/ANXIOUS: 0
ORTHOPNEA: 0
PALPITATIONS: 0
DOUBLE VISION: 0
PHOTOPHOBIA: 0
SPEECH CHANGE: 0
HEARTBURN: 0
DEPRESSION: 0
VOMITING: 0
NAUSEA: 0

## 2021-11-01 ASSESSMENT — PAIN DESCRIPTION - PAIN TYPE
TYPE: ACUTE PAIN
TYPE: ACUTE PAIN

## 2021-11-01 ASSESSMENT — FIBROSIS 4 INDEX: FIB4 SCORE: 47.15

## 2021-11-01 ASSESSMENT — LIFESTYLE VARIABLES: SUBSTANCE_ABUSE: 0

## 2021-11-01 NOTE — PROGRESS NOTES
Chemotherapy Verification - PRIMARY RN  Cycle 1 Day 7      Height = 177.8 cm  Weight = 79.3 kg  BSA = 1.98 m2       Medication: Azacitidine  Dose: 75 mg/m2  Calculated Dose: 148.5 mg (148.5 mg ordered)                             (In mg/m2, AUC, mg/kg)       I confirm this process was performed independently with the BSA and all final chemotherapy dosing calculations congruent.  Any discrepancies of 10% or greater have been addressed with the chemotherapy pharmacist. The resolution of the discrepancy has been documented in the EPIC progress notes.

## 2021-11-01 NOTE — PROGRESS NOTES
Oncology/Hematology Progress Note               Author: Leandro Flores M.D. Date & Time created: 11/1/2021  11:43 AM     CC: Acute myeloid leukemia  Interval History:  The patient is doing very well, No shortness of breath, chest pain, cough.  He has been ambulating in hallway, he does have mild fatigue.  Blood counts remain low.  No evidence of any ongoing infection.  No fevers or chills    PMHx, PSurgHx, SocHx, FAMHx: Reviewed and unchanged    Review of Systems:  Review of Systems   Constitutional: Positive for malaise/fatigue. Negative for chills, fever and weight loss.   HENT: Positive for hearing loss. Negative for nosebleeds.    Eyes: Negative for blurred vision, double vision and photophobia.   Respiratory: Negative for cough, sputum production and shortness of breath.    Cardiovascular: Negative for chest pain, palpitations and orthopnea.   Gastrointestinal: Negative for abdominal pain, heartburn, nausea and vomiting.   Genitourinary: Negative for dysuria and urgency.   Skin: Negative for rash.   Neurological: Negative for dizziness and headaches.   Endo/Heme/Allergies: Negative for environmental allergies. Does not bruise/bleed easily.   Psychiatric/Behavioral: Negative for depression, substance abuse and suicidal ideas. The patient is not nervous/anxious.        Physical Exam:  Physical Exam  Constitutional:       Appearance: Normal appearance.   HENT:      Head: Normocephalic and atraumatic.      Nose: Nose normal.      Mouth/Throat:      Mouth: Mucous membranes are moist.   Eyes:      Extraocular Movements: Extraocular movements intact.      Conjunctiva/sclera: Conjunctivae normal.   Cardiovascular:      Rate and Rhythm: Normal rate and regular rhythm.      Pulses: Normal pulses.      Heart sounds: Murmur heard.     Pulmonary:      Effort: Pulmonary effort is normal. No respiratory distress.      Breath sounds: Normal breath sounds. No stridor.   Abdominal:      General: Abdomen is flat.    Musculoskeletal:         General: No swelling or tenderness.      Cervical back: Normal range of motion.   Neurological:      General: No focal deficit present.      Mental Status: He is alert and oriented to person, place, and time.      Sensory: No sensory deficit.   Psychiatric:         Mood and Affect: Mood normal.         Labs:          Recent Labs     10/30/21  0055 10/31/21  0021 11/01/21  012   SODIUM 136 133* 134*   POTASSIUM 4.9 4.6 4.4   CHLORIDE 107 105 104   CO2    BUN 27* 25* 22   CREATININE 0.87 0.80 0.96   MAGNESIUM 1.9 1.8 1.7   PHOSPHORUS 4.3 4.0 3.6   CALCIUM 7.9* 7.8* 7.9*     Recent Labs     10/30/21  0055 10/31/21  0021 11/01/21  012   ALTSGPT 25 23 24   ASTSGOT 51* 36 39   ALKPHOSPHAT 121* 118* 111*   TBILIRUBIN 0.5 0.5 0.4   GLUCOSE 109* 97 101*     Recent Labs     10/30/21  0055 10/31/21  0021 11/01/21  012   RBC 2.14* 2.20* 1.85*   HEMOGLOBIN 7.1* 7.3* 6.2*   HEMATOCRIT 21.7* 22.1* 18.4*   PLATELETCT 22* 16* 13*     Recent Labs     10/30/21  0055 10/31/21  0021 11/01/21  012   WBC 5.0 4.5* 4.2*   NEUTSPOLYS 26.80* 33.30* 22.90*   LYMPHOCYTES 59.80* 50.90* 66.10*   MONOCYTES 1.80 10.50 3.70   EOSINOPHILS 0.00 0.00 0.00   BASOPHILS 0.00 0.00 0.90   ASTSGOT 51* 36 39   ALTSGPT 25 23 24   ALKPHOSPHAT 121* 118* 111*   TBILIRUBIN 0.5 0.5 0.4     Recent Labs     10/30/21  0055 10/31/21  0021 11/01/21  012   SODIUM 136 133* 134*   POTASSIUM 4.9 4.6 4.4   CHLORIDE 107 105 104   CO2    GLUCOSE 109* 97 101*   BUN 27* 25* 22   CREATININE 0.87 0.80 0.96   CALCIUM 7.9* 7.8* 7.9*     Hemodynamics:  Temp (24hrs), Av.8 °C (98.2 °F), Min:36.6 °C (97.8 °F), Max:37.1 °C (98.8 °F)  Temperature: 36.6 °C (97.9 °F)  Pulse  Av.8  Min: 67  Max: 106   Blood Pressure : (!) 93/56     Respiratory:    Respiration: 18, Pulse Oximetry: 96 %     Work Of Breathing / Effort: Mild  RUL Breath Sounds: Diminished, RML Breath Sounds: Diminished, RLL Breath Sounds: Diminished, JOE Breath Sounds:  Diminished, LLL Breath Sounds: Diminished  Fluids:    Intake/Output Summary (Last 24 hours) at 10/23/2021 1150  Last data filed at 10/23/2021 0900  Gross per 24 hour   Intake 1310 ml   Output --   Net 1310 ml        GI/Nutrition:  Orders Placed This Encounter   Procedures   • Diet Order Diet: Cardiac     Standing Status:   Standing     Number of Occurrences:   1     Order Specific Question:   Diet:     Answer:   Cardiac [6]     Medical Decision Making, by Problem:  Active Hospital Problems    Diagnosis    • *AML (acute myeloblastic leukemia) (HCC) [C92.00]    • Post-nasal drip [R09.82]    • Elevated LDH [R74.02]    • Hyponatremia [E87.1]    • Leukocytosis [D72.829]    • Thrombocytopenia (HCC) [D69.6]    • Atrial fibrillation (HCC) [I48.91]    • Anemia [D64.9]    • Essential hypertension, benign [I10]      Procedures    PICC line right arm 10/22/2021    Impression  1.  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  Negative mutation status  (IDH1/2, FLT3, NPM 1 and CEBPA). FISH negative.  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  Well-tolerated so far.    2.  Leukocytosis/anemia/thrombocytopenia all secondary to #1  -Transfuse if hemoglobin less than 7 g/dL or platelets less than 10,000,  irradiated, CMV negative blood products    3.  History of atrial fibrillation post cardioversion September 2021 on flecainide    4.  History of hypertension    5.  Covid infection in July 2021 with chronic sinus symptoms  -10/24 repeated Covid test negative by PCR     6.  Tumor lysis prophylaxis  -On allopurinol 300 mg daily    7.  Antibiotic prophylaxis with acyclovir and voriconazole    Plan  -The patient is doing fairly well, day 7 of treatment today.  Continue with Vidaza and venetoclax.   -Use if hemoglobin less than 7 g/dL or  platelets less than 10,000,  -Communicated with Meryl Anguiano and Benitez regarding ordering venetoclax and voriconazole  -Continue with prophylactic antibiotics  No signs of tumor lysis syndrome, keep checking daily labs, continue on allopurinol-  -will be discharged when above-mentioned drugs are available for the patient    high complexity/complex decision making      Quality-Core Measures

## 2021-11-01 NOTE — PROGRESS NOTES
Chemotherapy Verification - SECONDARY RN       Height = 177.8cm  Weight = 79.3kg  BSA = 1.98m2       Medication: Vidaza  Dose: 75mg/m2  Calculated Dose: 148.5mg                             (In mg/m2, AUC, mg/kg)         I confirm that this process was performed independently.

## 2021-11-01 NOTE — CARE PLAN
The patient is Watcher - Medium risk of patient condition declining or worsening    Shift Goals  Clinical Goals: Day 7 of chemotherapy  Patient Goals: Maintain hemodynamics    Progress made toward(s) clinical / shift goals:    Problem: Knowledge Deficit - Standard  Goal: Patient and family/care givers will demonstrate understanding of plan of care, disease process/condition, diagnostic tests and medications  Outcome: Progressing     Problem: Fall Risk  Goal: Patient will remain free from falls  Outcome: Progressing     Problem: Mobility  Goal: Patient's capacity to carry out activities will improve  Outcome: Progressing     Problem: Self Care  Goal: Patient will have the ability to perform ADLs independently or with assistance (bathe, groom, dress, toilet and feed)  Outcome: Progressing     Problem: Hemodynamics  Goal: Patient's hemodynamics, fluid balance and neurologic status will be stable or improve  Outcome: Progressing       COVID 19 surge in effect

## 2021-11-01 NOTE — PROGRESS NOTES
[facility] Medicine Daily Progress Note    Date of Service    11/1/2021    Chief Complaint  Alfonso Gaspar is a 76 y.o. male admitted 10/18/2021 with leukocytosis.     Hospital Course  This is a 76 year old male with PMHx of atrial fibrillation (s/p cardioversion, off AC, Dr. Low), hypertension, hyperlipidemia, and gout who was admitted on 10/18/2021 due to abnormal labs. Patient was seen by cardiology, Dr. Low, patient had a cardioversion in September 2021, patient returned to [facility], patient taken off of Eliquis due to recurrent nosebleeds.  Patient is currently on flecainide.  Patient had 4-5 ER visits due to this recurrent epistaxis, requiring packing, cauterization, and surgical procedure performed by ENT.  Patient is currently off of any anticoagulation.    Patient was sent in by PCP due to findings of leukocytosis, anemia, and thrombocytopenia on repeat labs. Labs on admission noted hemoglobin of 7.4, patient was transfused 1 unit, hemoglobin remained at 7.3.  We will continue with serial hemoglobins and transfuse if less than 7. Oncology was consulted, they do recommend inpatient bone marrow biopsy, this was performed 10/19, by my colleague, Dr. Arthur Caruso.  Oncology following.    Interval Problem Update  10/26:  Initiated on vidaza and venetoclax today.  He denies acute pain, nausea, or vomiting.  His only complaint is nasal congestion which has been chronic since his recent episode epistaxis.  VSS.   10/27:  Patient developed severe bleed from BMB wound in setting of thrombocytopenia.  Ordered 4 units plts.  Pressure wound to be applied.  Follow hgb.  No current clinical evidence of hypovolemia.   10/28:  Active bleed resolved today.  He did require PRBC transfusion overnight, however hgb stable this morning.  He does complain of ongoing nasal congestion.  Trial Allegra.  Denies acute pain or discomfort.   10/29:  Fatigued from treatment.  Congestion improved today.  No other acute needs.   10/30:  Hgb 7.1  this am.  Transfuse if needed.  No active bleed. Congestion continues to improve.  No acute needs at this time.  10/31:  Plt 16.  Transfusion ordered.  No acute changes overnight.  Patient remains clinically stable.     11/1:  Hgb 6.2.  Transfusion ordered.  Patient otherwise tolerating treatment well.  No acute pain or discomfort.  No acute bleeding.  Oncology attempting to get outpatient medications in place for discharge soon.     Consultants/Specialty  oncology    Code Status  Full Code    Disposition  Patient is not medically cleared.   Anticipate discharge to to home with close outpatient follow-up.  I have placed the appropriate orders for post-discharge needs.    Review of Systems  Review of Systems   Constitutional: Negative for chills, fever and malaise/fatigue.   HENT: Positive for congestion. Negative for sore throat.         Post nasal drip; hoarseness   Eyes: Negative for blurred vision and double vision.   Respiratory: Negative for cough and shortness of breath.    Cardiovascular: Negative for chest pain and leg swelling.   Gastrointestinal: Negative for abdominal pain, diarrhea, heartburn, nausea and vomiting.   Genitourinary: Negative for dysuria and hematuria.   Musculoskeletal: Negative for joint pain and myalgias.   Skin: Negative for itching and rash.   Neurological: Negative for dizziness, speech change and focal weakness.   Endo/Heme/Allergies: Bruises/bleeds easily.   Psychiatric/Behavioral: Negative for depression.   All other systems reviewed and are negative.       Physical Exam  Temp:  [36.6 °C (97.9 °F)-37.1 °C (98.8 °F)] 36.6 °C (97.9 °F)  Pulse:  [87-96] 87  Resp:  [16-18] 18  BP: ()/(49-63) 93/56  SpO2:  [92 %-96 %] 96 %    Physical Exam  Vitals and nursing note reviewed.   Constitutional:       General: He is not in acute distress.     Appearance: Normal appearance. He is not ill-appearing or toxic-appearing.      Interventions: Nasal cannula in place.   HENT:      Head:  Normocephalic and atraumatic.      Nose: Congestion present.      Mouth/Throat:      Mouth: Mucous membranes are dry.      Pharynx: No oropharyngeal exudate.   Eyes:      General: No scleral icterus.        Right eye: No discharge.         Left eye: No discharge.      Conjunctiva/sclera: Conjunctivae normal.      Pupils: Pupils are equal, round, and reactive to light.   Cardiovascular:      Rate and Rhythm: Normal rate and regular rhythm.      Pulses: Normal pulses.      Heart sounds: Normal heart sounds. No murmur heard.     Pulmonary:      Effort: Pulmonary effort is normal. No respiratory distress.      Breath sounds: Normal breath sounds. No wheezing or rales.   Abdominal:      General: Bowel sounds are normal. There is no distension.      Palpations: Abdomen is soft.      Tenderness: There is no abdominal tenderness.   Musculoskeletal:         General: Normal range of motion.      Cervical back: Neck supple. No rigidity.   Skin:     General: Skin is warm and dry.      Coloration: Skin is not jaundiced or pale.      Comments: Bleeding wound from BMB site resolved.    Neurological:      General: No focal deficit present.      Mental Status: He is alert and oriented to person, place, and time.   Psychiatric:         Attention and Perception: Attention normal.         Mood and Affect: Mood normal.         Fluids    Intake/Output Summary (Last 24 hours) at 11/1/2021 1414  Last data filed at 11/1/2021 0804  Gross per 24 hour   Intake 350 ml   Output --   Net 350 ml       Laboratory  Recent Labs     10/30/21  0055 10/31/21  0021 11/01/21  0120   WBC 5.0 4.5* 4.2*   RBC 2.14* 2.20* 1.85*   HEMOGLOBIN 7.1* 7.3* 6.2*   HEMATOCRIT 21.7* 22.1* 18.4*   .4* 100.5* 99.5*   MCH 33.2* 33.2* 33.5*   MCHC 32.7* 33.0* 33.7   RDW 77.3* 74.6* 75.0*   PLATELETCT 22* 16* 13*   MPV 13.3* 11.5  --      Recent Labs     10/30/21  0055 10/31/21  0021 11/01/21  0120   SODIUM 136 133* 134*   POTASSIUM 4.9 4.6 4.4   CHLORIDE 107 105  104   CO2 21 21 22   GLUCOSE 109* 97 101*   BUN 27* 25* 22   CREATININE 0.87 0.80 0.96   CALCIUM 7.9* 7.8* 7.9*                   Imaging  DX-CHEST-PORTABLE (1 VIEW)   Final Result      Right PICC line placement with the tip projecting over the proximal right atrium.      IR-PICC LINE PLACEMENT W/ GUIDANCE > AGE 5   Final Result                  Ultrasound-guided PICC placement performed by qualified nursing staff as    above.               Assessment/Plan  * AML (acute myeloblastic leukemia) (HCC)  Assessment & Plan  Based on initial interpretation of BMB  10/21 PICC  10/26 initiated on venetoclax and vidaza.  Prophylactic antifungal and antiviral initiated.  Oncology following.  Follow labs closely.   11/1:  Day 7 of treatment.  Oncology attempting to setup medications as outpatient for ongoing treatment.     Bleeding from wound  Assessment & Plan  Severe bleeding from BMB wound after removing bandage  Secondary to thrombocytopenia  Ordered 4 units of plts  Apply pressure wound  Follow hgb.  10/28:  Resolved this am.     Post-nasal drip  Assessment & Plan  W hoarseness and dry cough -- present since last COVID infection; likely post-infectous  Sudafed as needed and follow blood pressure  Humidified O2  Ocean mist nasal spray as needed  Antihistamine PRN  10/24: Repeat COVID PCR negative. CXR clear.  10/25 Mucinex  10/27:  Much improved with benadryl and robitussin.   10/28:  Worse today.  Trial allegra.   10/29:  Improving.    Hyponatremia  Assessment & Plan  Stable; monitor    Elevated LDH  Assessment & Plan  LDH baseline 347  Monitor following induction    Thrombocytopenia (HCC)- (present on admission)  Assessment & Plan  Tranfuse platelets prn  Likely secondary to marrow dysfunction  Daily CBC  10/27:  4 units plts ordered secondary to active bleed.   10/31:  Plt 16.  Transfuse.     Leukocytosis- (present on admission)  Assessment & Plan  Likely secondary to Leukemia  S/p bone marrow biopsy 10/19/2021 by   Arthur Caruso  Oncology consulted, Dr. ALEX Anguiano   Daily CBC w diff  Continue treatment per oncology      Atrial fibrillation (HCC)- (present on admission)  Assessment & Plan  Patient follows up with Dr. Low  Currently had a cardioversion in September 2021  As per cardiology no longer on anticoagulation  Patient to continue with flecainide  10/21: EKG - SR    Anemia- (present on admission)  Assessment & Plan  S/p bone marrow biopsy 10/19/2021 by Dr. Arthur Caruso  Oncology consulted, Dr. ALEX Anguiano   Required 1 unit PRBC on 10/18/2021  Monitor with serial hemoglobins, transfuse if hemoglobin is less than 7  10/21 Transfused; daily CBC w diff  Standing transfusion order placed by Onc  10/28:  Required blood transfusion overnight secondary to active bleed. Bleed has resolved.  Hgb stable this am.   10/29:  Hgb 7.3.  Continue to follow.    10/30:  Hgb 7.1.  Transfuse if needed.   11/1:  Hgb 6.2.  Transfusion ordered.     Essential hypertension, benign- (present on admission)  Assessment & Plan  Continue Home Medications  Labetalol ivp prn with parameters       VTE prophylaxis: SCDs/TEDs and pharmacologic prophylaxis contraindicated due to low platelets    I have performed a physical exam and reviewed and updated ROS and Plan today (11/1/2021). In review of yesterday's note (10/31/2021), there are no changes except as documented above.

## 2021-11-01 NOTE — PROGRESS NOTES
"Pharmacy Chemotherapy Verification    Patient Name: Alfonso Gaspar   Dx: AML    Protocol: AzaCITIDine + venetoclax     *Dosing Reference*  AzaCITIDine 75 mg/m2 IV over 10-40 minutes daily on Days 1-7  Venetoclax dose titration for voriconazole drug interaction              -10 mg PO on Day 1 followed by              -20 mg PO on Day 2 followed by               -50 mg PO on Day 3 followed by               -100 mg PO daily on Days 4-28  28 day cycle for 1 cycle  NCCN Guidelines for Acute Myeloid Leukemia.V.3.2020  Aron MYLES et al. Lancet Oncol. 2018;19(2):216-228  Aron MYLES et al. Blood. 2019;133(1):7-17    Allergies:  Pcn [penicillins]     BP (!) 93/56   Pulse 87   Temp 36.6 °C (97.9 °F) (Oral)   Resp 18   Ht 1.778 m (5' 10\")   Wt 79.3 kg (174 lb 13.2 oz)   SpO2 96%   BMI 25.08 kg/m²  Body surface area is 1.98 meters squared.    All lab results 11/1/21 reviewed.    **MD aware of labs, OK to proceed with treatment as outlined below.Transfusion parameters in place for Hgb <7 and platelets <10k.**    Drug Order   (Drug name, dose, route, IV Fluid & volume, frequency, number of doses) Cycle 1, Day 7 of 7  Previous treatment: n/a   Medication = azaCITIDtine (Vidaza)   Base Dose = 75 mg/m2   Calc Dose: Base Dose x 1.98 m2 = 148.5 mg  Final Dose = 148.5 mg  Route = IV  Fluid & Volume =  mL  Admin Duration = Over 10-40 minutes          <10% difference, OK to treat with final dose      By my signature below, I confirm this process was performed independently with the BSA and all final chemotherapy dosing calculations congruent. I have reviewed the above chemotherapy order and that my calculation of the final dose and BSA (when applicable) corroborate those calculations of the  pharmacist. Discrepancies of 10% or greater in the written dose have been addressed and documented within the [facility] Progress notes.      Cynthia Menendez, PharmD        "

## 2021-11-02 PROBLEM — T14.8XXA BLEEDING FROM WOUND: Status: RESOLVED | Noted: 2021-10-27 | Resolved: 2021-11-02

## 2021-11-02 PROBLEM — R74.02 ELEVATED LDH: Status: RESOLVED | Noted: 2021-10-20 | Resolved: 2021-11-02

## 2021-11-02 LAB
ALBUMIN SERPL BCP-MCNC: 2.6 G/DL (ref 3.2–4.9)
ALBUMIN/GLOB SERPL: 1 G/DL
ALP SERPL-CCNC: 114 U/L (ref 30–99)
ALT SERPL-CCNC: 23 U/L (ref 2–50)
ANION GAP SERPL CALC-SCNC: 10 MMOL/L (ref 7–16)
ANISOCYTOSIS BLD QL SMEAR: ABNORMAL
AST SERPL-CCNC: 38 U/L (ref 12–45)
BASOPHILS # BLD AUTO: 0 % (ref 0–1.8)
BASOPHILS # BLD: 0 K/UL (ref 0–0.12)
BILIRUB SERPL-MCNC: 0.5 MG/DL (ref 0.1–1.5)
BLASTS NFR BLD MANUAL: 7.7 %
BUN SERPL-MCNC: 18 MG/DL (ref 8–22)
CALCIUM SERPL-MCNC: 7.8 MG/DL (ref 8.5–10.5)
CHLORIDE SERPL-SCNC: 102 MMOL/L (ref 96–112)
CO2 SERPL-SCNC: 22 MMOL/L (ref 20–33)
CREAT SERPL-MCNC: 0.78 MG/DL (ref 0.5–1.4)
EOSINOPHIL # BLD AUTO: 0 K/UL (ref 0–0.51)
EOSINOPHIL NFR BLD: 0 % (ref 0–6.9)
ERYTHROCYTE [DISTWIDTH] IN BLOOD BY AUTOMATED COUNT: 73.7 FL (ref 35.9–50)
GLOBULIN SER CALC-MCNC: 2.6 G/DL (ref 1.9–3.5)
GLUCOSE SERPL-MCNC: 89 MG/DL (ref 65–99)
HCT VFR BLD AUTO: 22.3 % (ref 42–52)
HGB BLD-MCNC: 7.4 G/DL (ref 14–18)
HOWELL-JOLLY BOD BLD QL SMEAR: NORMAL
LYMPHOCYTES # BLD AUTO: 3.47 K/UL (ref 1–4.8)
LYMPHOCYTES NFR BLD: 80.8 % (ref 22–41)
MACROCYTES BLD QL SMEAR: ABNORMAL
MAGNESIUM SERPL-MCNC: 1.7 MG/DL (ref 1.5–2.5)
MANUAL DIFF BLD: NORMAL
MCH RBC QN AUTO: 32.7 PG (ref 27–33)
MCHC RBC AUTO-ENTMCNC: 33.2 G/DL (ref 33.7–35.3)
MCV RBC AUTO: 98.7 FL (ref 81.4–97.8)
MONOCYTES # BLD AUTO: 0.16 K/UL (ref 0–0.85)
MONOCYTES NFR BLD AUTO: 3.8 % (ref 0–13.4)
MORPHOLOGY BLD-IMP: NORMAL
NEUTROPHILS # BLD AUTO: 0.33 K/UL (ref 1.82–7.42)
NEUTROPHILS NFR BLD: 6.7 % (ref 44–72)
NEUTS BAND NFR BLD MANUAL: 1 % (ref 0–10)
NRBC # BLD AUTO: 0.23 K/UL
NRBC BLD-RTO: 5.3 /100 WBC
PHOSPHATE SERPL-MCNC: 3.4 MG/DL (ref 2.5–4.5)
PLATELET # BLD AUTO: 9 K/UL (ref 164–446)
PLATELET BLD QL SMEAR: NORMAL
PLATELETS.RETICULATED NFR BLD AUTO: 21.7 K/UL (ref 0.6–13.1)
POTASSIUM SERPL-SCNC: 4.2 MMOL/L (ref 3.6–5.5)
PROT SERPL-MCNC: 5.2 G/DL (ref 6–8.2)
RBC # BLD AUTO: 2.26 M/UL (ref 4.7–6.1)
RBC BLD AUTO: PRESENT
SMUDGE CELLS BLD QL SMEAR: NORMAL
SODIUM SERPL-SCNC: 134 MMOL/L (ref 135–145)
URATE SERPL-MCNC: 4.8 MG/DL (ref 2.5–8.3)
WBC # BLD AUTO: 4.3 K/UL (ref 4.8–10.8)

## 2021-11-02 PROCEDURE — A9270 NON-COVERED ITEM OR SERVICE: HCPCS | Performed by: INTERNAL MEDICINE

## 2021-11-02 PROCEDURE — 85027 COMPLETE CBC AUTOMATED: CPT

## 2021-11-02 PROCEDURE — 84550 ASSAY OF BLOOD/URIC ACID: CPT

## 2021-11-02 PROCEDURE — 85055 RETICULATED PLATELET ASSAY: CPT

## 2021-11-02 PROCEDURE — A9270 NON-COVERED ITEM OR SERVICE: HCPCS | Performed by: STUDENT IN AN ORGANIZED HEALTH CARE EDUCATION/TRAINING PROGRAM

## 2021-11-02 PROCEDURE — 36430 TRANSFUSION BLD/BLD COMPNT: CPT

## 2021-11-02 PROCEDURE — 770004 HCHG ROOM/CARE - ONCOLOGY PRIVATE *

## 2021-11-02 PROCEDURE — 700102 HCHG RX REV CODE 250 W/ 637 OVERRIDE(OP): Performed by: STUDENT IN AN ORGANIZED HEALTH CARE EDUCATION/TRAINING PROGRAM

## 2021-11-02 PROCEDURE — 85007 BL SMEAR W/DIFF WBC COUNT: CPT

## 2021-11-02 PROCEDURE — 700102 HCHG RX REV CODE 250 W/ 637 OVERRIDE(OP): Performed by: INTERNAL MEDICINE

## 2021-11-02 PROCEDURE — 86945 BLOOD PRODUCT/IRRADIATION: CPT

## 2021-11-02 PROCEDURE — P9034 PLATELETS, PHERESIS: HCPCS

## 2021-11-02 PROCEDURE — 83735 ASSAY OF MAGNESIUM: CPT

## 2021-11-02 PROCEDURE — 99233 SBSQ HOSP IP/OBS HIGH 50: CPT | Performed by: NURSE PRACTITIONER

## 2021-11-02 PROCEDURE — A9270 NON-COVERED ITEM OR SERVICE: HCPCS | Performed by: NURSE PRACTITIONER

## 2021-11-02 PROCEDURE — 700102 HCHG RX REV CODE 250 W/ 637 OVERRIDE(OP): Performed by: NURSE PRACTITIONER

## 2021-11-02 PROCEDURE — 80053 COMPREHEN METABOLIC PANEL: CPT

## 2021-11-02 PROCEDURE — A9270 NON-COVERED ITEM OR SERVICE: HCPCS | Performed by: GENERAL PRACTICE

## 2021-11-02 PROCEDURE — 84100 ASSAY OF PHOSPHORUS: CPT

## 2021-11-02 PROCEDURE — 700102 HCHG RX REV CODE 250 W/ 637 OVERRIDE(OP): Performed by: GENERAL PRACTICE

## 2021-11-02 RX ADMIN — Medication 2000 UNITS: at 04:10

## 2021-11-02 RX ADMIN — GUAIFENESIN 600 MG: 600 TABLET, EXTENDED RELEASE ORAL at 04:11

## 2021-11-02 RX ADMIN — ALBUTEROL SULFATE 2 PUFF: 90 AEROSOL, METERED RESPIRATORY (INHALATION) at 04:10

## 2021-11-02 RX ADMIN — ACYCLOVIR 400 MG: 200 CAPSULE ORAL at 17:19

## 2021-11-02 RX ADMIN — VORICONAZOLE 200 MG: 200 TABLET ORAL at 17:20

## 2021-11-02 RX ADMIN — DOCUSATE SODIUM 50 MG AND SENNOSIDES 8.6 MG 2 TABLET: 8.6; 5 TABLET, FILM COATED ORAL at 04:10

## 2021-11-02 RX ADMIN — DOCUSATE SODIUM 50 MG AND SENNOSIDES 8.6 MG 2 TABLET: 8.6; 5 TABLET, FILM COATED ORAL at 17:19

## 2021-11-02 RX ADMIN — ALBUTEROL SULFATE 2 PUFF: 90 AEROSOL, METERED RESPIRATORY (INHALATION) at 18:46

## 2021-11-02 RX ADMIN — VORICONAZOLE 200 MG: 200 TABLET ORAL at 04:10

## 2021-11-02 RX ADMIN — ALBUTEROL SULFATE 2 PUFF: 90 AEROSOL, METERED RESPIRATORY (INHALATION) at 12:44

## 2021-11-02 RX ADMIN — FERROUS SULFATE TAB 325 MG (65 MG ELEMENTAL FE) 325 MG: 325 (65 FE) TAB at 04:11

## 2021-11-02 RX ADMIN — ALBUTEROL SULFATE 2 PUFF: 90 AEROSOL, METERED RESPIRATORY (INHALATION) at 09:05

## 2021-11-02 RX ADMIN — Medication 1 TABLET: at 04:10

## 2021-11-02 RX ADMIN — VENETOCLAX 100 MG: 100 TABLET, FILM COATED ORAL at 04:09

## 2021-11-02 RX ADMIN — GUAIFENESIN 600 MG: 600 TABLET, EXTENDED RELEASE ORAL at 17:19

## 2021-11-02 RX ADMIN — ALBUTEROL SULFATE 2 PUFF: 90 AEROSOL, METERED RESPIRATORY (INHALATION) at 16:19

## 2021-11-02 RX ADMIN — ALLOPURINOL 300 MG: 300 TABLET ORAL at 04:11

## 2021-11-02 RX ADMIN — FEXOFENADINE HCL 60 MG: 60 TABLET, FILM COATED ORAL at 04:10

## 2021-11-02 RX ADMIN — ACYCLOVIR 400 MG: 200 CAPSULE ORAL at 04:10

## 2021-11-02 RX ADMIN — FLECAINIDE ACETATE 50 MG: 50 TABLET ORAL at 04:11

## 2021-11-02 RX ADMIN — FLECAINIDE ACETATE 50 MG: 50 TABLET ORAL at 17:25

## 2021-11-02 ASSESSMENT — ENCOUNTER SYMPTOMS
FOCAL WEAKNESS: 0
PHOTOPHOBIA: 0
BACK PAIN: 0
SPUTUM PRODUCTION: 0
BLURRED VISION: 0
COUGH: 0
WEIGHT LOSS: 0
DEPRESSION: 0
HEADACHES: 0
ORTHOPNEA: 0
PALPITATIONS: 0
CHILLS: 0
SPEECH CHANGE: 0
ABDOMINAL PAIN: 0
BRUISES/BLEEDS EASILY: 1
BRUISES/BLEEDS EASILY: 0
MYALGIAS: 0
DOUBLE VISION: 0
HEARTBURN: 0
VOMITING: 0
NECK PAIN: 0
DIZZINESS: 0
NERVOUS/ANXIOUS: 0
SHORTNESS OF BREATH: 0
DIARRHEA: 0
NAUSEA: 0
FEVER: 0
SORE THROAT: 0

## 2021-11-02 ASSESSMENT — FIBROSIS 4 INDEX: FIB4 SCORE: 67.79

## 2021-11-02 ASSESSMENT — LIFESTYLE VARIABLES: SUBSTANCE_ABUSE: 0

## 2021-11-02 ASSESSMENT — PAIN DESCRIPTION - PAIN TYPE: TYPE: ACUTE PAIN

## 2021-11-02 NOTE — PROGRESS NOTES
Oncology/Hematology Progress Note               Author: Leandro Flores M.D. Date & Time created: 11/2/2021  9:05 AM     CC: Acute myeloid leukemia  Interval History:  The patient is doing very well, No shortness of breath, chest pain, cough.  He has been ambulating in hallway, he does have mild fatigue.  Blood counts remain low.  No evidence of any ongoing infection.  No fevers or chills    PMHx, PSurgHx, SocHx, FAMHx: Reviewed and unchanged    Review of Systems:  Review of Systems   Constitutional: Positive for malaise/fatigue. Negative for fever and weight loss.   HENT: Positive for hearing loss. Negative for nosebleeds.    Eyes: Negative for blurred vision, double vision and photophobia.   Respiratory: Negative for cough, sputum production and shortness of breath.    Cardiovascular: Negative for chest pain, palpitations and orthopnea.   Gastrointestinal: Negative for abdominal pain, heartburn, nausea and vomiting.   Genitourinary: Negative for dysuria and urgency.   Musculoskeletal: Negative for back pain, myalgias and neck pain.   Skin: Negative for rash.   Neurological: Negative for dizziness and headaches.   Endo/Heme/Allergies: Negative for environmental allergies. Does not bruise/bleed easily.   Psychiatric/Behavioral: Negative for depression, substance abuse and suicidal ideas. The patient is not nervous/anxious.        Physical Exam:  Physical Exam  Constitutional:       Appearance: Normal appearance.   HENT:      Head: Normocephalic and atraumatic.      Nose: Nose normal.      Mouth/Throat:      Mouth: Mucous membranes are moist.   Eyes:      Extraocular Movements: Extraocular movements intact.      Conjunctiva/sclera: Conjunctivae normal.   Cardiovascular:      Rate and Rhythm: Normal rate and regular rhythm.      Pulses: Normal pulses.      Heart sounds: Murmur heard.   No gallop.    Pulmonary:      Effort: Pulmonary effort is normal. No respiratory distress.      Breath sounds: Normal breath sounds. No  stridor. No wheezing or rhonchi.   Abdominal:      General: Abdomen is flat.   Musculoskeletal:         General: No swelling or tenderness.      Cervical back: Normal range of motion.   Neurological:      General: No focal deficit present.      Mental Status: He is alert and oriented to person, place, and time.      Sensory: No sensory deficit.   Psychiatric:         Mood and Affect: Mood normal.         Labs:          Recent Labs     10/31/21  0021 11/01/21  0120 11/02/21  0123   SODIUM 133* 134* 134*   POTASSIUM 4.6 4.4 4.2   CHLORIDE 105 104 102   CO2  22   BUN 25* 22 18   CREATININE 0.80 0.96 0.78   MAGNESIUM 1.8 1.7 1.7   PHOSPHORUS 4.0 3.6 3.4   CALCIUM 7.8* 7.9* 7.8*     Recent Labs     10/31/21  0021 11/01/21  0120 11/02/21  0123   ALTSGPT 23 24 23   ASTSGOT 36 39 38   ALKPHOSPHAT 118* 111* 114*   TBILIRUBIN 0.5 0.4 0.5   GLUCOSE 97 101* 89     Recent Labs     10/31/21  0021 11/01/21  0120 11/02/21  0123   RBC 2.20* 1.85* 2.26*   HEMOGLOBIN 7.3* 6.2* 7.4*   HEMATOCRIT 22.1* 18.4* 22.3*   PLATELETCT 16* 13* 9*     Recent Labs     10/31/21  0021 11/01/21  0120 11/02/21  0123   WBC 4.5* 4.2* 4.3*   NEUTSPOLYS 33.30* 22.90* 6.70*   LYMPHOCYTES 50.90* 66.10* 80.80*   MONOCYTES 10.50 3.70 3.80   EOSINOPHILS 0.00 0.00 0.00   BASOPHILS 0.00 0.90 0.00   ASTSGOT 36 39 38   ALTSGPT 23 24 23   ALKPHOSPHAT 118* 111* 114*   TBILIRUBIN 0.5 0.4 0.5     Recent Labs     10/31/21  0021 11/01/21  0120 11/02/21  0123   SODIUM 133* 134* 134*   POTASSIUM 4.6 4.4 4.2   CHLORIDE 105 104 102   CO2  22   GLUCOSE 97 101* 89   BUN 25* 22 18   CREATININE 0.80 0.96 0.78   CALCIUM 7.8* 7.9* 7.8*     Hemodynamics:  Temp (24hrs), Av.8 °C (98.3 °F), Min:36.5 °C (97.7 °F), Max:37.1 °C (98.8 °F)  Temperature: 36.5 °C (97.7 °F)  Pulse  Av.8  Min: 67  Max: 106   Blood Pressure : 104/55     Respiratory:    Respiration: 19, Pulse Oximetry: 95 %        RUL Breath Sounds: Diminished, RML Breath Sounds: Diminished, RLL Breath  Sounds: Diminished, JOE Breath Sounds: Diminished, LLL Breath Sounds: Diminished  Fluids:    Intake/Output Summary (Last 24 hours) at 10/23/2021 1150  Last data filed at 10/23/2021 0900  Gross per 24 hour   Intake 1310 ml   Output --   Net 1310 ml     Weight: 80.1 kg (176 lb 9.4 oz)  GI/Nutrition:  Orders Placed This Encounter   Procedures   • Diet Order Diet: Cardiac     Standing Status:   Standing     Number of Occurrences:   1     Order Specific Question:   Diet:     Answer:   Cardiac [6]     Medical Decision Making, by Problem:  Active Hospital Problems    Diagnosis    • *AML (acute myeloblastic leukemia) (HCC) [C92.00]    • Post-nasal drip [R09.82]    • Elevated LDH [R74.02]    • Hyponatremia [E87.1]    • Leukocytosis [D72.829]    • Thrombocytopenia (HCC) [D69.6]    • Atrial fibrillation (HCC) [I48.91]    • Anemia [D64.9]    • Essential hypertension, benign [I10]      Procedures    PICC line right arm 10/22/2021    Impression  1.  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  Negative mutation status  (IDH1/2, FLT3, NPM 1 and CEBPA). FISH negative.  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  Well-tolerated so far.    2.  Leukocytosis/anemia/thrombocytopenia all secondary to #1  -Transfuse if hemoglobin less than 7 g/dL or platelets less than 10,000,  Irradiated.  Received platelets on 11-21  3.  History of atrial fibrillation post cardioversion September 2021 on flecainide.  No chest pain or palpitations    4.  History of hypertension    5.  Covid infection in July 2021 with chronic sinus symptoms  -10/24 repeated Covid test negative by PCR     6.  Tumor lysis prophylaxis  -On allopurinol 300 mg daily    7.  Antibiotic prophylaxis with acyclovir and voriconazole    Plan  -The patient is doing fairly well, day  8 of treatment today.  Continue with Vidaza and venetoclax.   -Use if hemoglobin less than 7 g/dL or platelets less than 10,000,  -Communicated with Meryl Anguiano and Benitez regarding ordering venetoclax and voriconazole.  Will follow up  -Continue with prophylactic antibiotics  No signs of tumor lysis syndrome, keep checking daily labs, continue on allopurinol-  -will be discharged when above-mentioned drugs are available for the patient    high complexity/complex decision making      Quality-Core Measures

## 2021-11-02 NOTE — PROGRESS NOTES
Vick from Lab called with critical result of Platelets at 9. Critical lab result read back to Vick.   This critical lab result is within parameters established by  for this patient    1 unit of platelets was ordered per standing blood transfusion orders for platelets less than 10,000

## 2021-11-02 NOTE — CARE PLAN
Problem: Knowledge Deficit - Standard  Goal: Patient and family/care givers will demonstrate understanding of plan of care, disease process/condition, diagnostic tests and medications  Outcome: Progressing     Problem: Fall Risk  Goal: Patient will remain free from falls  Outcome: Progressing     Problem: Mobility  Goal: Patient's capacity to carry out activities will improve  Outcome: Progressing     Problem: Self Care  Goal: Patient will have the ability to perform ADLs independently or with assistance (bathe, groom, dress, toilet and feed)  Outcome: Progressing   The patient is Stable    Shift Goals  Clinical Goals: no bleeding  Patient Goals: Rest    Progress made toward(s) clinical / shift goals: no bleeding    Patient is not progressing towards the following goals:None

## 2021-11-02 NOTE — PROGRESS NOTES
[facility] Medicine Daily Progress Note    Date of Service    11/2/2021    Chief Complaint  Alfonso Gaspar is a 76 y.o. male admitted 10/18/2021 with leukocytosis.     Hospital Course  This is a 76 year old male with PMHx of atrial fibrillation (s/p cardioversion, off AC, Dr. Low), hypertension, hyperlipidemia, and gout who was admitted on 10/18/2021 due to abnormal labs. Patient was seen by cardiology, Dr. Low, patient had a cardioversion in September 2021, patient returned to [facility], patient taken off of Eliquis due to recurrent nosebleeds.  Patient is currently on flecainide.  Patient had 4-5 ER visits due to this recurrent epistaxis, requiring packing, cauterization, and surgical procedure performed by ENT.  Patient is currently off of any anticoagulation.    Patient was sent in by PCP due to findings of leukocytosis, anemia, and thrombocytopenia on repeat labs. Labs on admission noted hemoglobin of 7.4, patient was transfused 1 unit, hemoglobin remained at 7.3.  We will continue with serial hemoglobins and transfuse if less than 7. Oncology was consulted, they do recommend inpatient bone marrow biopsy, this was performed 10/19, by my colleague, Dr. Arthur Caruso.  Oncology following.    Interval Problem Update  10/26:  Initiated on vidaza and venetoclax today.  He denies acute pain, nausea, or vomiting.  His only complaint is nasal congestion which has been chronic since his recent episode epistaxis.  VSS.   10/27:  Patient developed severe bleed from BMB wound in setting of thrombocytopenia.  Ordered 4 units plts.  Pressure wound to be applied.  Follow hgb.  No current clinical evidence of hypovolemia.   10/28:  Active bleed resolved today.  He did require PRBC transfusion overnight, however hgb stable this morning.  He does complain of ongoing nasal congestion.  Trial Allegra.  Denies acute pain or discomfort.   10/29:  Fatigued from treatment.  Congestion improved today.  No other acute needs.   10/30:  Hgb 7.1  this am.  Transfuse if needed.  No active bleed. Congestion continues to improve.  No acute needs at this time.  10/31:  Plt 16.  Transfusion ordered.  No acute changes overnight.  Patient remains clinically stable.     11/1:  Hgb 6.2.  Transfusion ordered.  Patient otherwise tolerating treatment well.  No acute pain or discomfort.  No acute bleeding.  Oncology attempting to get outpatient medications in place for discharge soon.   11/2: PLT 9- 1 unit PLT ordered. H/H stable. Patient denies any pain complaints. Eager to be discharged.     Consultants/Specialty  oncology    Code Status  Full Code    Disposition  Patient is medically cleared once oral chemotherapy arranged for outpatient   Anticipate discharge to to home with close outpatient follow-up.  I have placed the appropriate orders for post-discharge needs.    Review of Systems  Review of Systems   Constitutional: Negative for chills, fever and malaise/fatigue.   HENT: Positive for congestion. Negative for sore throat.         Post nasal drip; hoarseness   Eyes: Negative for blurred vision and double vision.   Respiratory: Negative for cough and shortness of breath.    Cardiovascular: Negative for chest pain and leg swelling.   Gastrointestinal: Negative for abdominal pain, diarrhea, heartburn, nausea and vomiting.   Genitourinary: Negative for dysuria and hematuria.   Musculoskeletal: Negative for joint pain and myalgias.   Skin: Negative for itching and rash.   Neurological: Negative for dizziness, speech change and focal weakness.   Endo/Heme/Allergies: Bruises/bleeds easily.   Psychiatric/Behavioral: Negative for depression.   All other systems reviewed and are negative.       Physical Exam  Temp:  [36.5 °C (97.7 °F)-37.1 °C (98.8 °F)] 36.5 °C (97.7 °F)  Pulse:  [87-96] 87  Resp:  [16-19] 19  BP: ()/(54-64) 104/55  SpO2:  [90 %-97 %] 95 %    Physical Exam  Vitals and nursing note reviewed.   Constitutional:       General: He is not in acute  distress.     Appearance: Normal appearance. He is not ill-appearing or toxic-appearing.      Interventions: Nasal cannula in place.   HENT:      Head: Normocephalic and atraumatic.      Nose: Congestion present.      Mouth/Throat:      Mouth: Mucous membranes are dry.      Pharynx: No oropharyngeal exudate.   Eyes:      General: No scleral icterus.        Right eye: No discharge.         Left eye: No discharge.      Conjunctiva/sclera: Conjunctivae normal.      Pupils: Pupils are equal, round, and reactive to light.   Cardiovascular:      Rate and Rhythm: Normal rate and regular rhythm.      Pulses: Normal pulses.      Heart sounds: Normal heart sounds. No murmur heard.     Pulmonary:      Effort: Pulmonary effort is normal. No respiratory distress.      Breath sounds: Normal breath sounds. No wheezing or rales.   Abdominal:      General: Bowel sounds are normal. There is no distension.      Palpations: Abdomen is soft.      Tenderness: There is no abdominal tenderness.   Musculoskeletal:         General: Normal range of motion.      Cervical back: Neck supple. No rigidity.   Skin:     General: Skin is warm and dry.      Coloration: Skin is not jaundiced or pale.      Comments: Bleeding wound from BMB site resolved.    Neurological:      General: No focal deficit present.      Mental Status: He is alert and oriented to person, place, and time.   Psychiatric:         Attention and Perception: Attention normal.         Mood and Affect: Mood normal.         Fluids    Intake/Output Summary (Last 24 hours) at 11/2/2021 1028  Last data filed at 11/2/2021 0936  Gross per 24 hour   Intake 691.33 ml   Output --   Net 691.33 ml       Laboratory  Recent Labs     10/31/21  0021 11/01/21  0120 11/02/21  0123   WBC 4.5* 4.2* 4.3*   RBC 2.20* 1.85* 2.26*   HEMOGLOBIN 7.3* 6.2* 7.4*   HEMATOCRIT 22.1* 18.4* 22.3*   .5* 99.5* 98.7*   MCH 33.2* 33.5* 32.7   MCHC 33.0* 33.7 33.2*   RDW 74.6* 75.0* 73.7*   PLATELETCT 16* 13* 9*    MPV 11.5  --   --      Recent Labs     10/31/21  0021 11/01/21  0120 11/02/21  0123   SODIUM 133* 134* 134*   POTASSIUM 4.6 4.4 4.2   CHLORIDE 105 104 102   CO2 21 22 22   GLUCOSE 97 101* 89   BUN 25* 22 18   CREATININE 0.80 0.96 0.78   CALCIUM 7.8* 7.9* 7.8*                   Imaging  DX-CHEST-PORTABLE (1 VIEW)   Final Result      Right PICC line placement with the tip projecting over the proximal right atrium.      IR-PICC LINE PLACEMENT W/ GUIDANCE > AGE 5   Final Result                  Ultrasound-guided PICC placement performed by qualified nursing staff as    above.               Assessment/Plan  * AML (acute myeloblastic leukemia) (HCC)  Assessment & Plan  Based on initial interpretation of BMB  10/21 PICC  10/26 initiated on venetoclax and vidaza.  Prophylactic antifungal and antiviral initiated.  Oncology following.  Follow labs closely.   11/1: Day 7 of treatment.  Oncology attempting to setup medications as outpatient for ongoing treatment.   11/2: Stable. Oncology working on outpatient Venetoclax and Vidaza for discharge     Hyponatremia  Assessment & Plan  Stable; monitor    Thrombocytopenia (HCC)- (present on admission)  Assessment & Plan  Tranfuse for PLT < 10,000   Likely secondary to marrow dysfunction  Continue Daily CBC  10/27:  4 units plts ordered secondary to active bleed.   10/31:  Plt 16.  Transfused.  11/2: PLT 9. Transfused     Leukocytosis- (present on admission)  Assessment & Plan  Likely secondary to Leukemia  S/p bone marrow biopsy 10/19/2021 by Dr. Arthur Caruso  Oncology consulted, Dr. ALEX Anguiano   Daily CBC w diff  Continue treatment per oncology      Atrial fibrillation (HCC)- (present on admission)  Assessment & Plan  Patient follows up with Dr. Low  Currently had a cardioversion in September 2021  As per cardiology no longer on anticoagulation  Patient to continue with flecainide  10/21: EKG - SR    Anemia- (present on admission)  Assessment & Plan  S/p bone marrow biopsy  10/19/2021 by Dr. Arthur Caruso  Oncology consulted, Dr. ALEX Anguiano   Required 1 unit PRBC on 10/18/2021  Monitor with serial hemoglobins, transfuse if hemoglobin is less than 7  10/21 Transfused; daily CBC w diff  Standing transfusion order placed by Onc  10/28:  Required blood transfusion overnight secondary to active bleed. Bleed has resolved.  Hgb stable this am.   10/29:  Hgb 7.3.  Continue to follow.    10/30:  Hgb 7.1.  Transfuse if needed.   11/1:  Hgb 6.2.  Transfusion ordered.   11/2: Hgb 7.4. Monitor     Essential hypertension, benign- (present on admission)  Assessment & Plan  Continue Home Medications  Labetalol ivp prn with parameters    Post-nasal drip  Assessment & Plan  W hoarseness and dry cough -- present since last COVID infection; likely post-infectous  Sudafed as needed and follow blood pressure  Humidified O2  Ocean mist nasal spray as needed  Antihistamine PRN  10/24: Repeat COVID PCR negative. CXR clear.  10/25 Mucinex  10/27:  Much improved with benadryl and robitussin.   10/28:  Worse today.  Trial allegra.   10/29:  Improving.  11/2: resolved        VTE prophylaxis: SCDs/TEDs and pharmacologic prophylaxis contraindicated due to low platelets    I have performed a physical exam and reviewed and updated ROS and Plan today (11/2/2021). In review of yesterday's note (11/1/2021), there are no changes except as documented above.

## 2021-11-03 PROBLEM — R09.82 POST-NASAL DRIP: Status: RESOLVED | Noted: 2021-10-22 | Resolved: 2021-11-03

## 2021-11-03 PROBLEM — D72.829 LEUKOCYTOSIS: Status: RESOLVED | Noted: 2021-10-19 | Resolved: 2021-11-03

## 2021-11-03 LAB
ALBUMIN SERPL BCP-MCNC: 2.5 G/DL (ref 3.2–4.9)
ALBUMIN/GLOB SERPL: 0.9 G/DL
ALP SERPL-CCNC: 115 U/L (ref 30–99)
ALT SERPL-CCNC: 20 U/L (ref 2–50)
ANION GAP SERPL CALC-SCNC: 7 MMOL/L (ref 7–16)
ANISOCYTOSIS BLD QL SMEAR: ABNORMAL
AST SERPL-CCNC: 36 U/L (ref 12–45)
BASOPHILS # BLD AUTO: 0 % (ref 0–1.8)
BASOPHILS # BLD: 0 K/UL (ref 0–0.12)
BILIRUB SERPL-MCNC: 0.5 MG/DL (ref 0.1–1.5)
BLASTS NFR BLD MANUAL: 3.6 %
BUN SERPL-MCNC: 16 MG/DL (ref 8–22)
CALCIUM SERPL-MCNC: 7.9 MG/DL (ref 8.5–10.5)
CHLORIDE SERPL-SCNC: 101 MMOL/L (ref 96–112)
CO2 SERPL-SCNC: 23 MMOL/L (ref 20–33)
CREAT SERPL-MCNC: 0.62 MG/DL (ref 0.5–1.4)
EOSINOPHIL # BLD AUTO: 0 K/UL (ref 0–0.51)
EOSINOPHIL NFR BLD: 0 % (ref 0–6.9)
ERYTHROCYTE [DISTWIDTH] IN BLOOD BY AUTOMATED COUNT: 71.8 FL (ref 35.9–50)
GLOBULIN SER CALC-MCNC: 2.7 G/DL (ref 1.9–3.5)
GLUCOSE SERPL-MCNC: 99 MG/DL (ref 65–99)
HCT VFR BLD AUTO: 19.8 % (ref 42–52)
HGB BLD-MCNC: 6.6 G/DL (ref 14–18)
HYPOCHROMIA BLD QL SMEAR: ABNORMAL
LYMPHOCYTES # BLD AUTO: 3.83 K/UL (ref 1–4.8)
LYMPHOCYTES NFR BLD: 70.9 % (ref 22–41)
MACROCYTES BLD QL SMEAR: ABNORMAL
MAGNESIUM SERPL-MCNC: 1.6 MG/DL (ref 1.5–2.5)
MANUAL DIFF BLD: NORMAL
MCH RBC QN AUTO: 33.2 PG (ref 27–33)
MCHC RBC AUTO-ENTMCNC: 33.3 G/DL (ref 33.7–35.3)
MCV RBC AUTO: 99.5 FL (ref 81.4–97.8)
MONOCYTES # BLD AUTO: 1.08 K/UL (ref 0–0.85)
MONOCYTES NFR BLD AUTO: 20 % (ref 0–13.4)
MORPHOLOGY BLD-IMP: NORMAL
NEUTROPHILS # BLD AUTO: 0.3 K/UL (ref 1.82–7.42)
NEUTROPHILS NFR BLD: 5.5 % (ref 44–72)
NRBC # BLD AUTO: 0.21 K/UL
NRBC BLD-RTO: 3.9 /100 WBC
OVALOCYTES BLD QL SMEAR: NORMAL
PHOSPHATE SERPL-MCNC: 3 MG/DL (ref 2.5–4.5)
PLATELET # BLD AUTO: 22 K/UL (ref 164–446)
PLATELET BLD QL SMEAR: NORMAL
PMV BLD AUTO: 10 FL (ref 9–12.9)
POIKILOCYTOSIS BLD QL SMEAR: NORMAL
POLYCHROMASIA BLD QL SMEAR: NORMAL
POTASSIUM SERPL-SCNC: 4.3 MMOL/L (ref 3.6–5.5)
PROT SERPL-MCNC: 5.2 G/DL (ref 6–8.2)
RBC # BLD AUTO: 1.99 M/UL (ref 4.7–6.1)
RBC BLD AUTO: PRESENT
SODIUM SERPL-SCNC: 131 MMOL/L (ref 135–145)
URATE SERPL-MCNC: 4.2 MG/DL (ref 2.5–8.3)
WBC # BLD AUTO: 5.4 K/UL (ref 4.8–10.8)

## 2021-11-03 PROCEDURE — 85027 COMPLETE CBC AUTOMATED: CPT

## 2021-11-03 PROCEDURE — 700102 HCHG RX REV CODE 250 W/ 637 OVERRIDE(OP): Performed by: INTERNAL MEDICINE

## 2021-11-03 PROCEDURE — 700102 HCHG RX REV CODE 250 W/ 637 OVERRIDE(OP): Performed by: NURSE PRACTITIONER

## 2021-11-03 PROCEDURE — 770004 HCHG ROOM/CARE - ONCOLOGY PRIVATE *

## 2021-11-03 PROCEDURE — P9016 RBC LEUKOCYTES REDUCED: HCPCS

## 2021-11-03 PROCEDURE — A9270 NON-COVERED ITEM OR SERVICE: HCPCS | Performed by: INTERNAL MEDICINE

## 2021-11-03 PROCEDURE — 83735 ASSAY OF MAGNESIUM: CPT

## 2021-11-03 PROCEDURE — A9270 NON-COVERED ITEM OR SERVICE: HCPCS | Performed by: STUDENT IN AN ORGANIZED HEALTH CARE EDUCATION/TRAINING PROGRAM

## 2021-11-03 PROCEDURE — 700102 HCHG RX REV CODE 250 W/ 637 OVERRIDE(OP): Performed by: STUDENT IN AN ORGANIZED HEALTH CARE EDUCATION/TRAINING PROGRAM

## 2021-11-03 PROCEDURE — 85007 BL SMEAR W/DIFF WBC COUNT: CPT

## 2021-11-03 PROCEDURE — 99233 SBSQ HOSP IP/OBS HIGH 50: CPT | Performed by: NURSE PRACTITIONER

## 2021-11-03 PROCEDURE — 86945 BLOOD PRODUCT/IRRADIATION: CPT

## 2021-11-03 PROCEDURE — A9270 NON-COVERED ITEM OR SERVICE: HCPCS | Performed by: NURSE PRACTITIONER

## 2021-11-03 PROCEDURE — 80053 COMPREHEN METABOLIC PANEL: CPT

## 2021-11-03 PROCEDURE — A9270 NON-COVERED ITEM OR SERVICE: HCPCS | Performed by: GENERAL PRACTICE

## 2021-11-03 PROCEDURE — 36430 TRANSFUSION BLD/BLD COMPNT: CPT

## 2021-11-03 PROCEDURE — 700102 HCHG RX REV CODE 250 W/ 637 OVERRIDE(OP): Performed by: GENERAL PRACTICE

## 2021-11-03 PROCEDURE — 84550 ASSAY OF BLOOD/URIC ACID: CPT

## 2021-11-03 PROCEDURE — 84100 ASSAY OF PHOSPHORUS: CPT

## 2021-11-03 PROCEDURE — 86923 COMPATIBILITY TEST ELECTRIC: CPT

## 2021-11-03 RX ORDER — DIPHENHYDRAMINE HCL 25 MG
25 TABLET ORAL ONCE
Status: COMPLETED | OUTPATIENT
Start: 2021-11-03 | End: 2021-11-03

## 2021-11-03 RX ADMIN — FEXOFENADINE HCL 60 MG: 60 TABLET, FILM COATED ORAL at 05:18

## 2021-11-03 RX ADMIN — ACYCLOVIR 400 MG: 200 CAPSULE ORAL at 17:06

## 2021-11-03 RX ADMIN — ALBUTEROL SULFATE 2 PUFF: 90 AEROSOL, METERED RESPIRATORY (INHALATION) at 17:07

## 2021-11-03 RX ADMIN — DIPHENHYDRAMINE HYDROCHLORIDE 25 MG: 25 TABLET ORAL at 05:05

## 2021-11-03 RX ADMIN — FERROUS SULFATE TAB 325 MG (65 MG ELEMENTAL FE) 325 MG: 325 (65 FE) TAB at 05:17

## 2021-11-03 RX ADMIN — FLECAINIDE ACETATE 50 MG: 50 TABLET ORAL at 05:18

## 2021-11-03 RX ADMIN — GUAIFENESIN 600 MG: 600 TABLET, EXTENDED RELEASE ORAL at 05:17

## 2021-11-03 RX ADMIN — Medication 1 TABLET: at 05:18

## 2021-11-03 RX ADMIN — ACYCLOVIR 400 MG: 200 CAPSULE ORAL at 05:17

## 2021-11-03 RX ADMIN — ALBUTEROL SULFATE 2 PUFF: 90 AEROSOL, METERED RESPIRATORY (INHALATION) at 07:43

## 2021-11-03 RX ADMIN — VORICONAZOLE 200 MG: 200 TABLET ORAL at 17:06

## 2021-11-03 RX ADMIN — VORICONAZOLE 200 MG: 200 TABLET ORAL at 05:17

## 2021-11-03 RX ADMIN — DOCUSATE SODIUM 50 MG AND SENNOSIDES 8.6 MG 2 TABLET: 8.6; 5 TABLET, FILM COATED ORAL at 17:06

## 2021-11-03 RX ADMIN — GUAIFENESIN 600 MG: 600 TABLET, EXTENDED RELEASE ORAL at 17:06

## 2021-11-03 RX ADMIN — ALBUTEROL SULFATE 2 PUFF: 90 AEROSOL, METERED RESPIRATORY (INHALATION) at 00:19

## 2021-11-03 RX ADMIN — ALBUTEROL SULFATE 2 PUFF: 90 AEROSOL, METERED RESPIRATORY (INHALATION) at 20:30

## 2021-11-03 RX ADMIN — ALLOPURINOL 300 MG: 300 TABLET ORAL at 05:18

## 2021-11-03 RX ADMIN — FLECAINIDE ACETATE 50 MG: 50 TABLET ORAL at 17:06

## 2021-11-03 RX ADMIN — ACETAMINOPHEN 650 MG: 325 TABLET ORAL at 03:34

## 2021-11-03 RX ADMIN — VENETOCLAX 100 MG: 100 TABLET, FILM COATED ORAL at 05:30

## 2021-11-03 RX ADMIN — Medication 2000 UNITS: at 05:17

## 2021-11-03 ASSESSMENT — ENCOUNTER SYMPTOMS
MYALGIAS: 0
HEADACHES: 0
CHILLS: 0
SPUTUM PRODUCTION: 0
PHOTOPHOBIA: 0
BRUISES/BLEEDS EASILY: 0
SORE THROAT: 0
WEIGHT LOSS: 0
SPEECH CHANGE: 0
DIARRHEA: 0
FEVER: 0
COUGH: 0
BACK PAIN: 0
BRUISES/BLEEDS EASILY: 1
NECK PAIN: 0
NAUSEA: 0
HEARTBURN: 0
DIZZINESS: 0
FOCAL WEAKNESS: 0
PALPITATIONS: 0
DOUBLE VISION: 0
BLURRED VISION: 0
VOMITING: 0
ABDOMINAL PAIN: 0
SHORTNESS OF BREATH: 0
NERVOUS/ANXIOUS: 0
ORTHOPNEA: 0
DEPRESSION: 0

## 2021-11-03 ASSESSMENT — PAIN DESCRIPTION - PAIN TYPE
TYPE: ACUTE PAIN
TYPE: ACUTE PAIN

## 2021-11-03 ASSESSMENT — FIBROSIS 4 INDEX: FIB4 SCORE: 28.17

## 2021-11-03 ASSESSMENT — LIFESTYLE VARIABLES: SUBSTANCE_ABUSE: 0

## 2021-11-03 NOTE — PROGRESS NOTES
Oncology/Hematology Progress Note               Author: Leandro Flores M.D. Date & Time created: 11/3/2021  12:53 PM     CC: Acute myeloid leukemia  Interval History:  The patient is doing very well, No shortness of breath, chest pain, cough.  He has been ambulating in hallway, he does have mild fatigue.   No evidence of any ongoing infection.  No fevers or chills.  Received packed red cell transfusion on 11/3/2021 and tolerated well.  Wife at bedside.    PMHx, PSurgHx, SocHx, FAMHx: Reviewed and unchanged    Review of Systems:  Review of Systems   Constitutional: Positive for malaise/fatigue. Negative for fever and weight loss.   HENT: Positive for hearing loss. Negative for nosebleeds.    Eyes: Negative for blurred vision, double vision and photophobia.   Respiratory: Negative for cough, sputum production and shortness of breath.    Cardiovascular: Negative for chest pain, palpitations and orthopnea.   Gastrointestinal: Negative for abdominal pain, heartburn, nausea and vomiting.   Genitourinary: Negative for dysuria and urgency.   Musculoskeletal: Positive for joint pain. Negative for back pain, myalgias and neck pain.   Skin: Negative for rash.   Neurological: Negative for dizziness and headaches.   Endo/Heme/Allergies: Negative for environmental allergies. Does not bruise/bleed easily.   Psychiatric/Behavioral: Negative for depression, substance abuse and suicidal ideas. The patient is not nervous/anxious.        Physical Exam:  Physical Exam  Constitutional:       Appearance: Normal appearance.   HENT:      Head: Normocephalic and atraumatic.      Nose: Nose normal.      Mouth/Throat:      Mouth: Mucous membranes are moist.   Eyes:      Extraocular Movements: Extraocular movements intact.      Conjunctiva/sclera: Conjunctivae normal.   Cardiovascular:      Rate and Rhythm: Normal rate and regular rhythm.      Pulses: Normal pulses.      Heart sounds: Murmur heard.   No gallop.    Pulmonary:      Effort:  Pulmonary effort is normal. No respiratory distress.      Breath sounds: Normal breath sounds. No stridor. No wheezing or rhonchi.   Abdominal:      General: Abdomen is flat.   Musculoskeletal:         General: No swelling or tenderness.      Cervical back: Normal range of motion.   Skin:     General: Skin is warm and dry.      Coloration: Skin is not jaundiced.   Neurological:      General: No focal deficit present.      Mental Status: He is alert and oriented to person, place, and time.      Sensory: No sensory deficit.   Psychiatric:         Mood and Affect: Mood normal.         Labs:          Recent Labs     21  0020   SODIUM 134* 134* 131*   POTASSIUM 4.4 4.2 4.3   CHLORIDE 104 102 101   CO2 22 22 23   BUN 22 18 16   CREATININE 0.96 0.78 0.62   MAGNESIUM 1.7 1.7 1.6   PHOSPHORUS 3.6 3.4 3.0   CALCIUM 7.9* 7.8* 7.9*     Recent Labs     21  0020   ALTSGPT 24 23 20   ASTSGOT 39 38 36   ALKPHOSPHAT 111* 114* 115*   TBILIRUBIN 0.4 0.5 0.5   GLUCOSE 101* 89 99     Recent Labs     21  0020   RBC 1.85* 2.26* 1.99*   HEMOGLOBIN 6.2* 7.4* 6.6*   HEMATOCRIT 18.4* 22.3* 19.8*   PLATELETCT 13* 9* 22*     Recent Labs     21  0020   WBC 4.2* 4.3* 5.4   NEUTSPOLYS 22.90* 6.70* 5.50*   LYMPHOCYTES 66.10* 80.80* 70.90*   MONOCYTES 3.70 3.80 20.00*   EOSINOPHILS 0.00 0.00 0.00   BASOPHILS 0.90 0.00 0.00   ASTSGOT 39 38 36   ALTSGPT 24 23 20   ALKPHOSPHAT 111* 114* 115*   TBILIRUBIN 0.4 0.5 0.5     Recent Labs     21  0020   SODIUM 134* 134* 131*   POTASSIUM 4.4 4.2 4.3   CHLORIDE 104 102 101   CO2 22 22 23   GLUCOSE 101* 89 99   BUN 22 18 16   CREATININE 0.96 0.78 0.62   CALCIUM 7.9* 7.8* 7.9*     Hemodynamics:  Temp (24hrs), Av.7 °C (98 °F), Min:36.3 °C (97.3 °F), Max:37.1 °C (98.7 °F)  Temperature: 36.3 °C (97.3 °F)  Pulse  Av.6  Min: 67   Max: 106   Blood Pressure : 100/63     Respiratory:    Respiration: 18, Pulse Oximetry: 94 %        RUL Breath Sounds: Clear, RML Breath Sounds: Diminished, RLL Breath Sounds: Fine Crackles, JOE Breath Sounds: Clear, LLL Breath Sounds: Fine Crackles  Fluids:    Intake/Output Summary (Last 24 hours) at 10/23/2021 1150  Last data filed at 10/23/2021 0900  Gross per 24 hour   Intake 1310 ml   Output --   Net 1310 ml        GI/Nutrition:  Orders Placed This Encounter   Procedures   • Diet Order Diet: Cardiac     Standing Status:   Standing     Number of Occurrences:   1     Order Specific Question:   Diet:     Answer:   Cardiac [6]     Medical Decision Making, by Problem:  Active Hospital Problems    Diagnosis    • *AML (acute myeloblastic leukemia) (HCC) [C92.00]    • Post-nasal drip [R09.82]    • Elevated LDH [R74.02]    • Hyponatremia [E87.1]    • Leukocytosis [D72.829]    • Thrombocytopenia (HCC) [D69.6]    • Atrial fibrillation (HCC) [I48.91]    • Anemia [D64.9]    • Essential hypertension, benign [I10]      Procedures    PICC line right arm 10/22/2021    Impression  1.  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  Negative mutation status  (IDH1/2, FLT3, NPM 1 and CEBPA). FISH negative.  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  Well-tolerated so far.  Prescription for venetoclax has been sent to the pharmacy.  2.  Leukocytosis/anemia/thrombocytopenia all secondary to #1  -Transfuse if hemoglobin less than 7 g/dL or platelets less than 10,000,  Irradiated.  Received PRBC on 11/3/2021  3.  History of atrial fibrillation post cardioversion September 2021 on flecainide.  No chest pain or palpitations    4.  History of hypertension    5.  Covid infection in July 2021 with chronic sinus symptoms  -10/24 repeated  Covid test negative by PCR     6.  Tumor lysis prophylaxis  -On allopurinol 300 mg daily    7.  Antibiotic prophylaxis with acyclovir and voriconazole    Plan  -The patient is doing fairly well, day 9 of treatment today.  Continue with Vidaza and venetoclax.   -Use if hemoglobin less than 7 g/dL or platelets less than 10,000,  -Office pharmacy confirmed that prescription has been sent to the pharmacy for venetoclax.  -Continue with prophylactic antibiotics  No signs of tumor lysis syndrome, keep checking daily labs, continue on allopurinol-  -will be discharged when above-mentioned drugs are available for the patient    high complexity/complex decision making      Quality-Core Measures

## 2021-11-03 NOTE — CARE PLAN
"The patient is Watcher - Medium risk of patient condition declining or worsening    Shift Goals  Clinical Goals: monitor and maintain hemodynamics  Patient Goals: Rest    Progress made toward(s) clinical / shift goals:    Problem: Fall Risk  Goal: Patient will remain free from falls. Patient educated on fall risk and use of call light. Dangle before standing. Environment cleared of tripping hazards.   Outcome: Progressing     Problem: Hemodynamics  Goal: Patient's hemodynamics, fluid balance and neurologic status will be stable or improve. Monitor labs and transfuse as ordered.   Outcome: Progressing     \"Covid-19 Surge in Effect\"      "

## 2021-11-03 NOTE — PROGRESS NOTES
Lab called with critical result of platelets of 22 and hemoglobin of 6.6 at 0145. Critical lab result read back to attendent.   This critical pletelet result is within parameters established by  for this patient. Standing transfusion orders to transfuse 1 unit irradiated RBCs for hemoglobin less than ar equal to 7. Unit was ordered, patient was premedicated with acetaminophen and diphenhydramine, transfusion was then initiated. No indication of transfusion reaction.

## 2021-11-03 NOTE — PROGRESS NOTES
[facility] Medicine Daily Progress Note    Date of Service    11/3/21    Chief Complaint  Alfonso Gaspar is a 76 y.o. male admitted 10/18/2021 with leukocytosis.     Hospital Course  This is a 76 year old male with PMHx of atrial fibrillation (s/p cardioversion, off AC, Dr. Low), hypertension, hyperlipidemia, and gout who was admitted on 10/18/2021 due to abnormal labs. Patient was seen by cardiology, Dr. Low, patient had a cardioversion in September 2021, patient returned to [facility], patient taken off of Eliquis due to recurrent nosebleeds.  Patient is currently on flecainide.  Patient had 4-5 ER visits due to this recurrent epistaxis, requiring packing, cauterization, and surgical procedure performed by ENT.  Patient is currently off of any anticoagulation.    Patient was sent in by PCP due to findings of leukocytosis, anemia, and thrombocytopenia on repeat labs. Labs on admission noted hemoglobin of 7.4, patient was transfused 1 unit, hemoglobin remained at 7.3.  We will continue with serial hemoglobins and transfuse if less than 7. Oncology was consulted, they do recommend inpatient bone marrow biopsy, this was performed 10/19, by my colleague, Dr. Arthur Caruso.  Oncology following.    Interval Problem Update  10/26:  Initiated on vidaza and venetoclax today.  He denies acute pain, nausea, or vomiting.  His only complaint is nasal congestion which has been chronic since his recent episode epistaxis.  VSS.   10/27:  Patient developed severe bleed from BMB wound in setting of thrombocytopenia.  Ordered 4 units plts.  Pressure wound to be applied.  Follow hgb.  No current clinical evidence of hypovolemia.   10/28:  Active bleed resolved today.  He did require PRBC transfusion overnight, however hgb stable this morning.  He does complain of ongoing nasal congestion.  Trial Allegra.  Denies acute pain or discomfort.   10/29:  Fatigued from treatment.  Congestion improved today.  No other acute needs.   10/30:  Hgb 7.1  this am.  Transfuse if needed.  No active bleed. Congestion continues to improve.  No acute needs at this time.  10/31:  Plt 16.  Transfusion ordered.  No acute changes overnight.  Patient remains clinically stable.     11/1:  Hgb 6.2.  Transfusion ordered.  Patient otherwise tolerating treatment well.  No acute pain or discomfort.  No acute bleeding.  Oncology attempting to get outpatient medications in place for discharge soon.   11/2: PLT 9- 1 unit PLT ordered. H/H stable. Patient denies any pain complaints. Eager to be discharged.   11/3: Hgb 6.6- 1 unit pRBC ordered. PLT 22 . Soft blood pressures (SBP 95-96), but asymptomatic. He denies any pain complaints .       Consultants/Specialty  oncology    Code Status  Full Code    Disposition  Patient is medically cleared once oral chemotherapy arranged for outpatient   Anticipate discharge to to home with close outpatient follow-up.  I have placed the appropriate orders for post-discharge needs.    Review of Systems  Review of Systems   Constitutional: Negative for chills, fever and malaise/fatigue.   HENT: Positive for congestion (nearly resolved ). Negative for sore throat.    Eyes: Negative for blurred vision and double vision.   Respiratory: Negative for cough and shortness of breath.    Cardiovascular: Negative for chest pain and leg swelling.   Gastrointestinal: Negative for abdominal pain, diarrhea, heartburn, nausea and vomiting.   Genitourinary: Negative for dysuria and hematuria.   Musculoskeletal: Negative for joint pain and myalgias.   Skin: Negative for itching and rash.   Neurological: Negative for dizziness, speech change and focal weakness.   Endo/Heme/Allergies: Bruises/bleeds easily.   Psychiatric/Behavioral: Negative for depression.        Physical Exam  Temp:  [36.3 °C (97.3 °F)-37.1 °C (98.7 °F)] 36.3 °C (97.3 °F)  Pulse:  [80-90] 88  Resp:  [16-18] 18  BP: ()/(49-67) 100/63  SpO2:  [92 %-97 %] 94 %    Physical Exam  Vitals and nursing note  reviewed.   Constitutional:       General: He is not in acute distress.     Appearance: He is not ill-appearing or toxic-appearing.      Interventions: Nasal cannula in place.   HENT:      Head: Normocephalic and atraumatic.      Nose: Congestion present.      Mouth/Throat:      Mouth: Mucous membranes are moist.      Pharynx: Oropharynx is clear. No oropharyngeal exudate.   Eyes:      General: No scleral icterus.        Right eye: No discharge.         Left eye: No discharge.      Pupils: Pupils are equal, round, and reactive to light.   Cardiovascular:      Rate and Rhythm: Normal rate and regular rhythm.      Pulses: Normal pulses.      Heart sounds: No murmur heard.     Pulmonary:      Effort: Pulmonary effort is normal. No respiratory distress.      Breath sounds: No wheezing or rales.   Abdominal:      General: Bowel sounds are normal. There is no distension.      Palpations: Abdomen is soft.      Tenderness: There is no abdominal tenderness.   Musculoskeletal:         General: Normal range of motion.      Cervical back: Neck supple. No rigidity.      Right lower leg: No edema.      Left lower leg: No edema.   Skin:     General: Skin is warm and dry.      Coloration: Skin is pale. Skin is not jaundiced.      Findings: Bruising (scattered to BUE) present.   Neurological:      Mental Status: He is alert and oriented to person, place, and time. Mental status is at baseline.   Psychiatric:         Attention and Perception: Attention normal.         Mood and Affect: Mood normal.         Thought Content: Thought content normal.         Judgment: Judgment normal.         Fluids    Intake/Output Summary (Last 24 hours) at 11/3/2021 1041  Last data filed at 11/3/2021 0818  Gross per 24 hour   Intake 348 ml   Output --   Net 348 ml       Laboratory  Recent Labs     11/01/21  0120 11/02/21  0123 11/03/21  0020   WBC 4.2* 4.3* 5.4   RBC 1.85* 2.26* 1.99*   HEMOGLOBIN 6.2* 7.4* 6.6*   HEMATOCRIT 18.4* 22.3* 19.8*   MCV  99.5* 98.7* 99.5*   MCH 33.5* 32.7 33.2*   MCHC 33.7 33.2* 33.3*   RDW 75.0* 73.7* 71.8*   PLATELETCT 13* 9* 22*   MPV  --   --  10.0     Recent Labs     11/01/21  0120 11/02/21  0123 11/03/21  0020   SODIUM 134* 134* 131*   POTASSIUM 4.4 4.2 4.3   CHLORIDE 104 102 101   CO2 22 22 23   GLUCOSE 101* 89 99   BUN 22 18 16   CREATININE 0.96 0.78 0.62   CALCIUM 7.9* 7.8* 7.9*                   Imaging  DX-CHEST-PORTABLE (1 VIEW)   Final Result      Right PICC line placement with the tip projecting over the proximal right atrium.      IR-PICC LINE PLACEMENT W/ GUIDANCE > AGE 5   Final Result                  Ultrasound-guided PICC placement performed by qualified nursing staff as    above.               Assessment/Plan  * AML (acute myeloblastic leukemia) (HCC)  Assessment & Plan  Per BMBx done 10/18/21  Vidaza and venetoclax initiated 10/26/2021  Continue antibiotic prophylaxis with acyclovir and voriconazole  Follow oncology recommendations   Monitor for tumor lysis   10/21 PICC  11/1: Day 7 of treatment.  Oncology attempting to setup medications as outpatient for ongoing treatment.   11/2: Stable. Oncology working on outpatient Venetoclax and Vidaza for discharge   11/3: Prior authorization has been sent for above medications. Awaiting insurance approval per oncology       Hyponatremia  Assessment & Plan  Mild, stable   Continue to monitor on BMP     Thrombocytopenia (HCC)- (present on admission)  Assessment & Plan  Secondary to above   Tranfuse for PLT < 10,000   Continue Daily CBC  10/27:  4 units plts ordered secondary to active bleed.   10/31:  Plt 16.  Transfused.  11/2: PLT 9. Transfused   11/3: PLT 22     Atrial fibrillation (HCC)- (present on admission)  Assessment & Plan  Patient follows up with Dr. Low  Currently had a cardioversion in September 2021  As per cardiology no longer on anticoagulation  Patient to continue with flecainide  10/21: EKG - SR    Anemia- (present on admission)  Assessment &  Plan  Monitor with serial hemoglobins, transfuse if hemoglobin is less than 7  10/21 Transfused; daily CBC w diff. Standing transfusion order placed by Onc  10/28:  Required blood transfusion overnight secondary to active bleed. Bleed has resolved.  Hgb stable this am.   10/29:  Hgb 7.3.  Continue to follow.    10/30:  Hgb 7.1.  Transfuse if needed.   11/1:  Hgb 6.2.  Transfusion ordered.   11/2: Hgb 7.4. Monitor   11/3: Hgb 6.6. Transfusion ordered     Essential hypertension, benign- (present on admission)  Assessment & Plan  Continue Home Medications  Labetalol ivp prn with parameters       VTE prophylaxis: SCDs/TEDs and pharmacologic prophylaxis contraindicated due to thrombocytopenia and severe anemia     I have performed a physical exam and reviewed and updated ROS and Plan today (11/3/2021). In review of yesterday's note (11/2/2021), there are no changes except as documented above.

## 2021-11-04 LAB
ALBUMIN SERPL BCP-MCNC: 2.5 G/DL (ref 3.2–4.9)
ALBUMIN/GLOB SERPL: 0.9 G/DL
ALP SERPL-CCNC: 114 U/L (ref 30–99)
ALT SERPL-CCNC: 22 U/L (ref 2–50)
ANION GAP SERPL CALC-SCNC: 8 MMOL/L (ref 7–16)
ANISOCYTOSIS BLD QL SMEAR: ABNORMAL
AST SERPL-CCNC: 37 U/L (ref 12–45)
BASOPHILS # BLD AUTO: 0 % (ref 0–1.8)
BASOPHILS # BLD: 0 K/UL (ref 0–0.12)
BILIRUB SERPL-MCNC: 0.6 MG/DL (ref 0.1–1.5)
BLASTS NFR BLD MANUAL: 6 %
BUN SERPL-MCNC: 14 MG/DL (ref 8–22)
CALCIUM SERPL-MCNC: 8 MG/DL (ref 8.5–10.5)
CHLORIDE SERPL-SCNC: 103 MMOL/L (ref 96–112)
CO2 SERPL-SCNC: 23 MMOL/L (ref 20–33)
CREAT SERPL-MCNC: 0.58 MG/DL (ref 0.5–1.4)
EOSINOPHIL # BLD AUTO: 0 K/UL (ref 0–0.51)
EOSINOPHIL NFR BLD: 0 % (ref 0–6.9)
ERYTHROCYTE [DISTWIDTH] IN BLOOD BY AUTOMATED COUNT: 66.3 FL (ref 35.9–50)
GLOBULIN SER CALC-MCNC: 2.9 G/DL (ref 1.9–3.5)
GLUCOSE SERPL-MCNC: 86 MG/DL (ref 65–99)
HCT VFR BLD AUTO: 22.8 % (ref 42–52)
HGB BLD-MCNC: 7.6 G/DL (ref 14–18)
LYMPHOCYTES # BLD AUTO: 4.07 K/UL (ref 1–4.8)
LYMPHOCYTES NFR BLD: 72.7 % (ref 22–41)
MACROCYTES BLD QL SMEAR: ABNORMAL
MANUAL DIFF BLD: NORMAL
MCH RBC QN AUTO: 32.2 PG (ref 27–33)
MCHC RBC AUTO-ENTMCNC: 33.3 G/DL (ref 33.7–35.3)
MCV RBC AUTO: 96.6 FL (ref 81.4–97.8)
MICROCYTES BLD QL SMEAR: ABNORMAL
MONOCYTES # BLD AUTO: 0.29 K/UL (ref 0–0.85)
MONOCYTES NFR BLD AUTO: 5.1 % (ref 0–13.4)
MORPHOLOGY BLD-IMP: NORMAL
NEUTROPHILS # BLD AUTO: 0.91 K/UL (ref 1.82–7.42)
NEUTROPHILS NFR BLD: 16.2 % (ref 44–72)
NRBC # BLD AUTO: 0.17 K/UL
NRBC BLD-RTO: 3 /100 WBC
OVALOCYTES BLD QL SMEAR: NORMAL
PHOSPHATE SERPL-MCNC: 3.3 MG/DL (ref 2.5–4.5)
PLATELET # BLD AUTO: 18 K/UL (ref 164–446)
PLATELET BLD QL SMEAR: NORMAL
PLATELETS.RETICULATED NFR BLD AUTO: 12.1 K/UL (ref 0.6–13.1)
PMV BLD AUTO: 10 FL (ref 9–12.9)
POIKILOCYTOSIS BLD QL SMEAR: NORMAL
POTASSIUM SERPL-SCNC: 4.2 MMOL/L (ref 3.6–5.5)
PROT SERPL-MCNC: 5.4 G/DL (ref 6–8.2)
RBC # BLD AUTO: 2.36 M/UL (ref 4.7–6.1)
RBC BLD AUTO: PRESENT
SMUDGE CELLS BLD QL SMEAR: NORMAL
SODIUM SERPL-SCNC: 134 MMOL/L (ref 135–145)
URATE SERPL-MCNC: 4.2 MG/DL (ref 2.5–8.3)
WBC # BLD AUTO: 5.6 K/UL (ref 4.8–10.8)

## 2021-11-04 PROCEDURE — 80053 COMPREHEN METABOLIC PANEL: CPT

## 2021-11-04 PROCEDURE — A9270 NON-COVERED ITEM OR SERVICE: HCPCS | Performed by: NURSE PRACTITIONER

## 2021-11-04 PROCEDURE — 770004 HCHG ROOM/CARE - ONCOLOGY PRIVATE *

## 2021-11-04 PROCEDURE — 84100 ASSAY OF PHOSPHORUS: CPT

## 2021-11-04 PROCEDURE — 85027 COMPLETE CBC AUTOMATED: CPT

## 2021-11-04 PROCEDURE — 99232 SBSQ HOSP IP/OBS MODERATE 35: CPT | Performed by: NURSE PRACTITIONER

## 2021-11-04 PROCEDURE — 85007 BL SMEAR W/DIFF WBC COUNT: CPT

## 2021-11-04 PROCEDURE — 85055 RETICULATED PLATELET ASSAY: CPT

## 2021-11-04 PROCEDURE — A9270 NON-COVERED ITEM OR SERVICE: HCPCS | Performed by: GENERAL PRACTICE

## 2021-11-04 PROCEDURE — A9270 NON-COVERED ITEM OR SERVICE: HCPCS | Performed by: INTERNAL MEDICINE

## 2021-11-04 PROCEDURE — 700102 HCHG RX REV CODE 250 W/ 637 OVERRIDE(OP): Performed by: NURSE PRACTITIONER

## 2021-11-04 PROCEDURE — A9270 NON-COVERED ITEM OR SERVICE: HCPCS | Performed by: STUDENT IN AN ORGANIZED HEALTH CARE EDUCATION/TRAINING PROGRAM

## 2021-11-04 PROCEDURE — 700102 HCHG RX REV CODE 250 W/ 637 OVERRIDE(OP): Performed by: STUDENT IN AN ORGANIZED HEALTH CARE EDUCATION/TRAINING PROGRAM

## 2021-11-04 PROCEDURE — 84550 ASSAY OF BLOOD/URIC ACID: CPT

## 2021-11-04 PROCEDURE — 700102 HCHG RX REV CODE 250 W/ 637 OVERRIDE(OP): Performed by: INTERNAL MEDICINE

## 2021-11-04 PROCEDURE — 700102 HCHG RX REV CODE 250 W/ 637 OVERRIDE(OP): Performed by: GENERAL PRACTICE

## 2021-11-04 RX ADMIN — ACYCLOVIR 400 MG: 200 CAPSULE ORAL at 17:07

## 2021-11-04 RX ADMIN — GUAIFENESIN 600 MG: 600 TABLET, EXTENDED RELEASE ORAL at 17:07

## 2021-11-04 RX ADMIN — VORICONAZOLE 200 MG: 200 TABLET ORAL at 17:07

## 2021-11-04 RX ADMIN — ACYCLOVIR 400 MG: 200 CAPSULE ORAL at 05:00

## 2021-11-04 RX ADMIN — GUAIFENESIN 600 MG: 600 TABLET, EXTENDED RELEASE ORAL at 05:00

## 2021-11-04 RX ADMIN — DOCUSATE SODIUM 50 MG AND SENNOSIDES 8.6 MG 2 TABLET: 8.6; 5 TABLET, FILM COATED ORAL at 17:07

## 2021-11-04 RX ADMIN — FEXOFENADINE HCL 60 MG: 60 TABLET, FILM COATED ORAL at 05:00

## 2021-11-04 RX ADMIN — Medication 1 TABLET: at 05:00

## 2021-11-04 RX ADMIN — ALBUTEROL SULFATE 2 PUFF: 90 AEROSOL, METERED RESPIRATORY (INHALATION) at 17:08

## 2021-11-04 RX ADMIN — VORICONAZOLE 200 MG: 200 TABLET ORAL at 05:00

## 2021-11-04 RX ADMIN — DOCUSATE SODIUM 50 MG AND SENNOSIDES 8.6 MG 2 TABLET: 8.6; 5 TABLET, FILM COATED ORAL at 05:00

## 2021-11-04 RX ADMIN — TRAZODONE HYDROCHLORIDE 50 MG: 100 TABLET ORAL at 20:19

## 2021-11-04 RX ADMIN — FLECAINIDE ACETATE 50 MG: 50 TABLET ORAL at 05:00

## 2021-11-04 RX ADMIN — FERROUS SULFATE TAB 325 MG (65 MG ELEMENTAL FE) 325 MG: 325 (65 FE) TAB at 05:00

## 2021-11-04 RX ADMIN — ALBUTEROL SULFATE 2 PUFF: 90 AEROSOL, METERED RESPIRATORY (INHALATION) at 00:14

## 2021-11-04 RX ADMIN — ALLOPURINOL 300 MG: 300 TABLET ORAL at 05:00

## 2021-11-04 RX ADMIN — FLECAINIDE ACETATE 50 MG: 50 TABLET ORAL at 17:07

## 2021-11-04 RX ADMIN — ALBUTEROL SULFATE 2 PUFF: 90 AEROSOL, METERED RESPIRATORY (INHALATION) at 11:53

## 2021-11-04 RX ADMIN — VENETOCLAX 100 MG: 100 TABLET, FILM COATED ORAL at 05:01

## 2021-11-04 RX ADMIN — Medication 2000 UNITS: at 05:00

## 2021-11-04 ASSESSMENT — ENCOUNTER SYMPTOMS
DIARRHEA: 0
VOMITING: 0
DIZZINESS: 0
MYALGIAS: 0
PHOTOPHOBIA: 0
ABDOMINAL PAIN: 0
HEADACHES: 0
SPEECH CHANGE: 0
FALLS: 0
BRUISES/BLEEDS EASILY: 0
FEVER: 0
SHORTNESS OF BREATH: 0
COUGH: 0
NAUSEA: 0
DEPRESSION: 0
DOUBLE VISION: 0
NERVOUS/ANXIOUS: 0
SPUTUM PRODUCTION: 0
BRUISES/BLEEDS EASILY: 1
ORTHOPNEA: 0
FOCAL WEAKNESS: 0
CHILLS: 0
SORE THROAT: 0
BACK PAIN: 0
BLURRED VISION: 0
WEIGHT LOSS: 0
HEARTBURN: 0
PALPITATIONS: 0

## 2021-11-04 ASSESSMENT — PAIN DESCRIPTION - PAIN TYPE
TYPE: ACUTE PAIN
TYPE: ACUTE PAIN

## 2021-11-04 ASSESSMENT — FIBROSIS 4 INDEX: FIB4 SCORE: 33.74

## 2021-11-04 ASSESSMENT — LIFESTYLE VARIABLES: SUBSTANCE_ABUSE: 0

## 2021-11-04 NOTE — CARE PLAN
The patient is Stable - Low risk of patient condition declining or worsening    Shift Goals  Clinical Goals: Monitor for signs of infection  Patient Goals: Ambulate    Progress made toward(s) clinical / shift goals:        Problem: Mobility  Goal: Patient's capacity to carry out activities will improve  Outcome: Progressing  Note: Pt is A&Ox4, up self with steady gait, ambulates around unit frequently.      Problem: Infection - Standard  Goal: Patient will remain free from infection  Outcome: Progressing  Note: Q4hr vitals and daily labs being monitored, hand hygiene performed before and after pt interaction, neutropenic precautions in place.

## 2021-11-04 NOTE — PROGRESS NOTES
[facility] Medicine Daily Progress Note    Date of Service    11/4/21    Chief Complaint  Alfonso Gaspar is a 76 y.o. male admitted 10/18/2021 with leukocytosis.     Hospital Course  This is a 76 year old male with PMHx of atrial fibrillation (s/p cardioversion, off AC, Dr. Low), hypertension, hyperlipidemia, and gout who was admitted on 10/18/2021 due to abnormal labs. Patient was seen by cardiology, Dr. Low, patient had a cardioversion in September 2021, patient returned to [facility], patient taken off of Eliquis due to recurrent nosebleeds.  Patient is currently on flecainide.  Patient had 4-5 ER visits due to this recurrent epistaxis, requiring packing, cauterization, and surgical procedure performed by ENT.  Patient is currently off of any anticoagulation.    Patient was sent in by PCP due to findings of leukocytosis, anemia, and thrombocytopenia on repeat labs. Labs on admission noted hemoglobin of 7.4, patient was transfused 1 unit, hemoglobin remained at 7.3.  We will continue with serial hemoglobins and transfuse if less than 7. Oncology was consulted, they do recommend inpatient bone marrow biopsy, this was performed 10/19, by my colleague, Dr. Arthur Caruso.  Oncology following.    Interval Problem Update  10/26:  Initiated on vidaza and venetoclax today.  He denies acute pain, nausea, or vomiting.  His only complaint is nasal congestion which has been chronic since his recent episode epistaxis.  VSS.   10/27:  Patient developed severe bleed from BMB wound in setting of thrombocytopenia.  Ordered 4 units plts.  Pressure wound to be applied.  Follow hgb.  No current clinical evidence of hypovolemia.   10/28:  Active bleed resolved today.  He did require PRBC transfusion overnight, however hgb stable this morning.  He does complain of ongoing nasal congestion.  Trial Allegra.  Denies acute pain or discomfort.   10/29:  Fatigued from treatment.  Congestion improved today.  No other acute needs.   10/30:  Hgb 7.1  this am.  Transfuse if needed.  No active bleed. Congestion continues to improve.  No acute needs at this time.  10/31:  Plt 16.  Transfusion ordered.  No acute changes overnight.  Patient remains clinically stable.     11/1:  Hgb 6.2.  Transfusion ordered.  Patient otherwise tolerating treatment well.  No acute pain or discomfort.  No acute bleeding.  Oncology attempting to get outpatient medications in place for discharge soon.   11/2: PLT 9- 1 unit PLT ordered. H/H stable. Patient denies any pain complaints. Eager to be discharged.   11/3: Hgb 6.6- 1 unit pRBC ordered. PLT 22 . Soft blood pressures (SBP 95-96), but asymptomatic. He denies any pain complaints   11/4: No overnight events. He did not require blood product transfusions overnight. Medically cleared for discharge once outpatient medications arranged by oncology        Consultants/Specialty  oncology    Code Status  Full Code    Disposition  Patient is medically cleared once oral chemotherapy arranged for outpatient   Anticipate discharge to to home with close outpatient follow-up.  I have placed the appropriate orders for post-discharge needs.    Review of Systems  Review of Systems   Constitutional: Negative for chills, fever and malaise/fatigue.   HENT: Negative for congestion and sore throat.    Eyes: Negative for blurred vision and double vision.   Respiratory: Negative for cough and shortness of breath.    Cardiovascular: Negative for chest pain and leg swelling.   Gastrointestinal: Negative for abdominal pain, diarrhea, heartburn, nausea and vomiting.   Genitourinary: Negative for dysuria and hematuria.   Musculoskeletal: Negative for joint pain and myalgias.   Skin: Negative for itching and rash.   Neurological: Negative for dizziness, speech change and focal weakness.   Endo/Heme/Allergies: Bruises/bleeds easily.   Psychiatric/Behavioral: Negative for depression.        Physical Exam  Temp:  [36.5 °C (97.7 °F)-36.9 °C (98.4 °F)] 36.5 °C (97.7  °F)  Pulse:  [89-96] 90  Resp:  [18-20] 18  BP: (103-125)/(63-97) 105/66  SpO2:  [92 %-95 %] 92 %    Physical Exam  Vitals and nursing note reviewed.   Constitutional:       General: He is not in acute distress.     Appearance: He is not ill-appearing or toxic-appearing.      Interventions: Nasal cannula in place.   HENT:      Head: Normocephalic and atraumatic.      Nose: Congestion present.      Mouth/Throat:      Mouth: Mucous membranes are moist.      Pharynx: Oropharynx is clear. No oropharyngeal exudate.   Eyes:      General: No scleral icterus.        Right eye: No discharge.         Left eye: No discharge.      Pupils: Pupils are equal, round, and reactive to light.   Cardiovascular:      Rate and Rhythm: Normal rate and regular rhythm.      Pulses: Normal pulses.      Heart sounds: No murmur heard.     Pulmonary:      Effort: Pulmonary effort is normal. No respiratory distress.      Breath sounds: No wheezing or rales.   Abdominal:      General: Bowel sounds are normal. There is no distension.      Palpations: Abdomen is soft.      Tenderness: There is no abdominal tenderness.   Musculoskeletal:         General: Normal range of motion.      Cervical back: Neck supple. No rigidity.      Right lower leg: No edema.      Left lower leg: No edema.   Skin:     General: Skin is warm and dry.      Coloration: Skin is pale. Skin is not jaundiced.      Findings: Bruising (scattered to BUE) present.   Neurological:      Mental Status: He is alert and oriented to person, place, and time. Mental status is at baseline.   Psychiatric:         Attention and Perception: Attention normal.         Mood and Affect: Mood normal.         Thought Content: Thought content normal.         Judgment: Judgment normal.         Fluids  No intake or output data in the 24 hours ending 11/04/21 1231    Laboratory  Recent Labs     11/02/21  0123 11/03/21  0020 11/04/21  0017   WBC 4.3* 5.4 5.6   RBC 2.26* 1.99* 2.36*   HEMOGLOBIN 7.4* 6.6*  7.6*   HEMATOCRIT 22.3* 19.8* 22.8*   MCV 98.7* 99.5* 96.6   MCH 32.7 33.2* 32.2   MCHC 33.2* 33.3* 33.3*   RDW 73.7* 71.8* 66.3*   PLATELETCT 9* 22* 18*   MPV  --  10.0 10.0     Recent Labs     11/02/21  0123 11/03/21  0020 11/04/21  0017   SODIUM 134* 131* 134*   POTASSIUM 4.2 4.3 4.2   CHLORIDE 102 101 103   CO2 22 23 23   GLUCOSE 89 99 86   BUN 18 16 14   CREATININE 0.78 0.62 0.58   CALCIUM 7.8* 7.9* 8.0*                   Imaging  DX-CHEST-PORTABLE (1 VIEW)   Final Result      Right PICC line placement with the tip projecting over the proximal right atrium.      IR-PICC LINE PLACEMENT W/ GUIDANCE > AGE 5   Final Result                  Ultrasound-guided PICC placement performed by qualified nursing staff as    above.               Assessment/Plan  * AML (acute myeloblastic leukemia) (HCC)  Assessment & Plan  Per BMBx done 10/18/21  Vidaza and venetoclax initiated 10/26/2021  Continue antibiotic prophylaxis with acyclovir and voriconazole  Follow oncology recommendations   Monitor for tumor lysis   10/21 PICC  11/1: Day 7 of treatment.  Oncology attempting to setup medications as outpatient for ongoing treatment.   11/2: Stable. Oncology working on outpatient Venetoclax and Vidaza for discharge   11/4: Prior authorization has been sent for above medications. Awaiting insurance approval per oncology       Hyponatremia  Assessment & Plan  Mild, stable   Continue to monitor on BMP     Thrombocytopenia (HCC)- (present on admission)  Assessment & Plan  Secondary to above   Tranfuse for PLT < 10,000   Continue Daily CBC  10/27:  4 units plts ordered secondary to active bleed.   10/31:  Plt 16.  Transfused.  11/2: PLT 9. Transfused   11/4: PLT 18     Atrial fibrillation (HCC)- (present on admission)  Assessment & Plan  Patient follows up with Dr. Low  Currently had a cardioversion in September 2021  As per cardiology no longer on anticoagulation  Patient to continue with flecainide  10/21: EKG - SR    Anemia- (present  on admission)  Assessment & Plan  Monitor with serial hemoglobins, transfuse if hemoglobin is less than 7  10/21 Transfused; daily CBC w diff.   10/30:  Hgb 7.1.  Transfuse if needed.   11/1:  Hgb 6.2.  Transfusion ordered.   11/3: Hgb 6.6. Transfusion ordered   11/4: H/H stable.     Essential hypertension, benign- (present on admission)  Assessment & Plan  Continue Home Medications  Labetalol ivp prn with parameters       VTE prophylaxis: SCDs/TEDs and pharmacologic prophylaxis contraindicated due to thrombocytopenia and severe anemia     I have performed a physical exam and reviewed and updated ROS and Plan today (11/4/2021). In review of yesterday's note (11/3/2021), there are no changes except as documented above.

## 2021-11-04 NOTE — PROGRESS NOTES
Palomo from Lab called with critical result of platelets of 18 at 0106. Critical lab result read back to Palomo.   This critical lab result is within parameters established by  for this patient.

## 2021-11-04 NOTE — CARE PLAN
"The patient is Watcher - Medium risk of patient condition declining or worsening    Shift Goals  Clinical Goals: Remain free of s/sx of infection. / Monitor and maintain hemodynamic stability.   Patient Goals: Rest    Progress made toward(s) clinical / shift goals:    Problem: Knowledge Deficit - Standard  Goal: Patient and family/care givers will demonstrate understanding of plan of care, disease process/condition, diagnostic tests and medications  Outcome: Progressing     Problem: Fall Risk  Goal: Patient will remain free from falls  Outcome: Progressing     Problem: Mobility  Goal: Patient's capacity to carry out activities will improve  Outcome: Progressing     Problem: Pain - Standard  Goal: Alleviation of pain or a reduction in pain to the patient’s comfort goal. Discussed the option of working with PT for exercises to relieve knee pain.   Outcome: Progressing     \"Covid-19 Surge in Effect\"      "

## 2021-11-04 NOTE — PROGRESS NOTES
Oncology/Hematology Progress Note               Author: Leandro Flores M.D. Date & Time created: 11/4/2021  12:53 PM     CC: Acute myeloid leukemia  Interval History:  The patient is doing very well, No shortness of breath, chest pain, cough.  He has been ambulating in hallway, he does have mild fatigue.   No evidence of any ongoing infection.  No fevers or chills.  Received packed red cell transfusion on 11/3/2021 and tolerated well.  Wife at bedside.    PMHx, PSurgHx, SocHx, FAMHx: Reviewed and unchanged    Review of Systems:  Review of Systems   Constitutional: Positive for malaise/fatigue. Negative for fever and weight loss.   HENT: Positive for hearing loss. Negative for nosebleeds.    Eyes: Negative for blurred vision, double vision and photophobia.   Respiratory: Negative for cough, sputum production and shortness of breath.    Cardiovascular: Negative for chest pain, palpitations and orthopnea.   Gastrointestinal: Negative for abdominal pain, heartburn, nausea and vomiting.   Genitourinary: Negative for dysuria and urgency.   Musculoskeletal: Positive for joint pain. Negative for back pain, falls and myalgias.   Skin: Negative for rash.   Neurological: Negative for dizziness, speech change, focal weakness and headaches.   Endo/Heme/Allergies: Negative for environmental allergies. Does not bruise/bleed easily.   Psychiatric/Behavioral: Negative for depression, substance abuse and suicidal ideas. The patient is not nervous/anxious.        Physical Exam:  Physical Exam  Constitutional:       Appearance: Normal appearance.   HENT:      Head: Normocephalic and atraumatic.      Nose: Nose normal.      Mouth/Throat:      Mouth: Mucous membranes are moist.   Eyes:      Extraocular Movements: Extraocular movements intact.      Conjunctiva/sclera: Conjunctivae normal.   Cardiovascular:      Rate and Rhythm: Normal rate and regular rhythm.      Pulses: Normal pulses.      Heart sounds: Murmur heard.   No gallop.     Pulmonary:      Effort: Pulmonary effort is normal. No respiratory distress.      Breath sounds: Normal breath sounds. No stridor. No wheezing or rhonchi.   Abdominal:      General: Abdomen is flat. There is no distension.      Palpations: There is no mass.   Musculoskeletal:         General: No swelling or tenderness.      Cervical back: Normal range of motion.   Skin:     General: Skin is warm and dry.      Coloration: Skin is not jaundiced.   Neurological:      General: No focal deficit present.      Mental Status: He is alert and oriented to person, place, and time. Mental status is at baseline.      Sensory: No sensory deficit.   Psychiatric:         Mood and Affect: Mood normal.         Labs:          Recent Labs     210 21  001   SODIUM 134* 131* 134*   POTASSIUM 4.2 4.3 4.2   CHLORIDE 102 101 103   CO2 22 23 23   BUN 18 16 14   CREATININE 0.78 0.62 0.58   MAGNESIUM 1.7 1.6  --    PHOSPHORUS 3.4 3.0 3.3   CALCIUM 7.8* 7.9* 8.0*     Recent Labs     21  0020 21  0017   ALTSGPT 23 20 22   ASTSGOT 38 36 37   ALKPHOSPHAT 114* 115* 114*   TBILIRUBIN 0.5 0.5 0.6   GLUCOSE 89 99 86     Recent Labs     21  0020 21  0017   RBC 2.26* 1.99* 2.36*   HEMOGLOBIN 7.4* 6.6* 7.6*   HEMATOCRIT 22.3* 19.8* 22.8*   PLATELETCT 9* 22* 18*     Recent Labs     21  0020 21  0017   WBC 4.3* 5.4 5.6   NEUTSPOLYS 6.70* 5.50* 16.20*   LYMPHOCYTES 80.80* 70.90* 72.70*   MONOCYTES 3.80 20.00* 5.10   EOSINOPHILS 0.00 0.00 0.00   BASOPHILS 0.00 0.00 0.00   ASTSGOT 38 36 37   ALTSGPT 23 20 22   ALKPHOSPHAT 114* 115* 114*   TBILIRUBIN 0.5 0.5 0.6     Recent Labs     21  0020 21  0017   SODIUM 134* 131* 134*   POTASSIUM 4.2 4.3 4.2   CHLORIDE 102 101 103   CO2 22 23 23   GLUCOSE 89 99 86   BUN 18 16 14   CREATININE 0.78 0.62 0.58   CALCIUM 7.8* 7.9* 8.0*     Hemodynamics:  Temp (24hrs), Av.7 °C (98  °F), Min:36.5 °C (97.7 °F), Max:36.9 °C (98.4 °F)  Temperature: 36.5 °C (97.7 °F)  Pulse  Av.7  Min: 67  Max: 106   Blood Pressure : 105/66     Respiratory:    Respiration: 18, Pulse Oximetry: 92 %        RUL Breath Sounds: Clear, RML Breath Sounds: Diminished, RLL Breath Sounds: Diminished, JOE Breath Sounds: Clear, LLL Breath Sounds: Diminished  Fluids:    Intake/Output Summary (Last 24 hours) at 10/23/2021 1150  Last data filed at 10/23/2021 0900  Gross per 24 hour   Intake 1310 ml   Output --   Net 1310 ml     Weight: 78.6 kg (173 lb 4.5 oz)  GI/Nutrition:  Orders Placed This Encounter   Procedures   • Diet Order Diet: Cardiac     Standing Status:   Standing     Number of Occurrences:   1     Order Specific Question:   Diet:     Answer:   Cardiac [6]     Medical Decision Making, by Problem:  Active Hospital Problems    Diagnosis    • *AML (acute myeloblastic leukemia) (HCC) [C92.00]    • Post-nasal drip [R09.82]    • Elevated LDH [R74.02]    • Hyponatremia [E87.1]    • Leukocytosis [D72.829]    • Thrombocytopenia (HCC) [D69.6]    • Atrial fibrillation (HCC) [I48.91]    • Anemia [D64.9]    • Essential hypertension, benign [I10]      Procedures    PICC line right arm 10/22/2021    Impression  1.  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  Negative mutation status  (IDH1/2, FLT3, NPM 1 and CEBPA). FISH negative.  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  Well-tolerated so far.  Prescription for venetoclax has been sent to the pharmacy.  Venetoclax has been approved by her insurance but approval for posaconazole is still pending  2.  Leukocytosis/anemia/thrombocytopenia all secondary to #1  -Transfuse if hemoglobin less than 7 g/dL or platelets less than 10,000,  Irradiated.  Received PRBC on 11/3/2021  3.   History of atrial fibrillation post cardioversion September 2021 on flecainide.  No chest pain or palpitations    4.  History of hypertension    5.  Covid infection in July 2021 with chronic sinus symptoms  -10/24 repeated Covid test negative by PCR     6.  Tumor lysis prophylaxis  -On allopurinol 300 mg daily    7.  Antibiotic prophylaxis with acyclovir and voriconazole    Plan  -The patient is doing fairly well, day 9 of treatment today.  Continue with Vidaza and venetoclax.   -Use if hemoglobin less than 7 g/dL or platelets less than 10,000,  -Office pharmacy confirmed that prescription has been sent to the pharmacy for venetoclax.  -Continue with prophylactic antibiotics  No signs of tumor lysis syndrome, keep checking daily labs, continue on allopurinol-  -will be discharged when above-mentioned drugs are available for the patient    high complexity/complex decision making      Quality-Core Measures

## 2021-11-05 ENCOUNTER — PHARMACY VISIT (OUTPATIENT)
Dept: PHARMACY | Facility: MEDICAL CENTER | Age: 76
End: 2021-11-05
Payer: MEDICARE

## 2021-11-05 VITALS
HEIGHT: 70 IN | BODY MASS INDEX: 24.81 KG/M2 | DIASTOLIC BLOOD PRESSURE: 62 MMHG | WEIGHT: 173.28 LBS | TEMPERATURE: 98.2 F | HEART RATE: 99 BPM | RESPIRATION RATE: 18 BRPM | OXYGEN SATURATION: 97 % | SYSTOLIC BLOOD PRESSURE: 99 MMHG

## 2021-11-05 LAB
ABO GROUP BLD: ABNORMAL
ALBUMIN SERPL BCP-MCNC: 2.5 G/DL (ref 3.2–4.9)
ALBUMIN/GLOB SERPL: 0.9 G/DL
ALP SERPL-CCNC: 110 U/L (ref 30–99)
ALT SERPL-CCNC: 21 U/L (ref 2–50)
ANION GAP SERPL CALC-SCNC: 7 MMOL/L (ref 7–16)
ANISOCYTOSIS BLD QL SMEAR: ABNORMAL
AST SERPL-CCNC: 38 U/L (ref 12–45)
BARCODED ABORH UBTYP: 5100
BARCODED ABORH UBTYP: 5100
BARCODED ABORH UBTYP: 6200
BARCODED PRD CODE UBPRD: ABNORMAL
BARCODED PRD CODE UBPRD: NORMAL
BARCODED PRD CODE UBPRD: NORMAL
BARCODED UNIT NUM UBUNT: ABNORMAL
BARCODED UNIT NUM UBUNT: NORMAL
BARCODED UNIT NUM UBUNT: NORMAL
BASOPHILS # BLD AUTO: 0 % (ref 0–1.8)
BASOPHILS # BLD: 0 K/UL (ref 0–0.12)
BILIRUB SERPL-MCNC: 0.5 MG/DL (ref 0.1–1.5)
BLASTS NFR BLD MANUAL: 24.8 %
BLD GP AB SCN SERPL QL: ABNORMAL
BUN SERPL-MCNC: 11 MG/DL (ref 8–22)
CALCIUM SERPL-MCNC: 7.9 MG/DL (ref 8.5–10.5)
CHLORIDE SERPL-SCNC: 105 MMOL/L (ref 96–112)
CO2 SERPL-SCNC: 23 MMOL/L (ref 20–33)
COMPONENT P 8504P: NORMAL
COMPONENT P 8504P: NORMAL
COMPONENT R 8504R: ABNORMAL
CREAT SERPL-MCNC: 0.55 MG/DL (ref 0.5–1.4)
EOSINOPHIL # BLD AUTO: 0 K/UL (ref 0–0.51)
EOSINOPHIL NFR BLD: 0 % (ref 0–6.9)
ERYTHROCYTE [DISTWIDTH] IN BLOOD BY AUTOMATED COUNT: 64.7 FL (ref 35.9–50)
GLOBULIN SER CALC-MCNC: 2.8 G/DL (ref 1.9–3.5)
GLUCOSE SERPL-MCNC: 84 MG/DL (ref 65–99)
HCT VFR BLD AUTO: 22 % (ref 42–52)
HGB BLD-MCNC: 7.3 G/DL (ref 14–18)
LYMPHOCYTES # BLD AUTO: 3.9 K/UL (ref 1–4.8)
LYMPHOCYTES NFR BLD: 55.7 % (ref 22–41)
MACROCYTES BLD QL SMEAR: ABNORMAL
MANUAL DIFF BLD: NORMAL
MCH RBC QN AUTO: 32.7 PG (ref 27–33)
MCHC RBC AUTO-ENTMCNC: 33.2 G/DL (ref 33.7–35.3)
MCV RBC AUTO: 98.7 FL (ref 81.4–97.8)
MONOCYTES # BLD AUTO: 0.99 K/UL (ref 0–0.85)
MONOCYTES NFR BLD AUTO: 14.2 % (ref 0–13.4)
MORPHOLOGY BLD-IMP: NORMAL
NEUTROPHILS # BLD AUTO: 0.37 K/UL (ref 1.82–7.42)
NEUTROPHILS NFR BLD: 5.3 % (ref 44–72)
NRBC # BLD AUTO: 0.21 K/UL
NRBC BLD-RTO: 3 /100 WBC
PHOSPHATE SERPL-MCNC: 3.8 MG/DL (ref 2.5–4.5)
PLATELET # BLD AUTO: 14 K/UL (ref 164–446)
PLATELET BLD QL SMEAR: NORMAL
PLATELETS.RETICULATED NFR BLD AUTO: 15.8 K/UL (ref 0.6–13.1)
PMV BLD AUTO: 10.9 FL (ref 9–12.9)
POTASSIUM SERPL-SCNC: 4.1 MMOL/L (ref 3.6–5.5)
PRODUCT TYPE UPROD: ABNORMAL
PRODUCT TYPE UPROD: NORMAL
PRODUCT TYPE UPROD: NORMAL
PROT SERPL-MCNC: 5.3 G/DL (ref 6–8.2)
RBC # BLD AUTO: 2.23 M/UL (ref 4.7–6.1)
RBC BLD AUTO: PRESENT
RH BLD: ABNORMAL
SODIUM SERPL-SCNC: 135 MMOL/L (ref 135–145)
UNIT STATUS USTAT: ABNORMAL
UNIT STATUS USTAT: NORMAL
UNIT STATUS USTAT: NORMAL
URATE SERPL-MCNC: 4.3 MG/DL (ref 2.5–8.3)
WBC # BLD AUTO: 7 K/UL (ref 4.8–10.8)

## 2021-11-05 PROCEDURE — 36430 TRANSFUSION BLD/BLD COMPNT: CPT

## 2021-11-05 PROCEDURE — 84550 ASSAY OF BLOOD/URIC ACID: CPT

## 2021-11-05 PROCEDURE — 700102 HCHG RX REV CODE 250 W/ 637 OVERRIDE(OP): Performed by: STUDENT IN AN ORGANIZED HEALTH CARE EDUCATION/TRAINING PROGRAM

## 2021-11-05 PROCEDURE — 700102 HCHG RX REV CODE 250 W/ 637 OVERRIDE(OP): Performed by: INTERNAL MEDICINE

## 2021-11-05 PROCEDURE — 80053 COMPREHEN METABOLIC PANEL: CPT

## 2021-11-05 PROCEDURE — 86901 BLOOD TYPING SEROLOGIC RH(D): CPT

## 2021-11-05 PROCEDURE — A9270 NON-COVERED ITEM OR SERVICE: HCPCS | Performed by: NURSE PRACTITIONER

## 2021-11-05 PROCEDURE — A9270 NON-COVERED ITEM OR SERVICE: HCPCS | Performed by: GENERAL PRACTICE

## 2021-11-05 PROCEDURE — 86900 BLOOD TYPING SEROLOGIC ABO: CPT

## 2021-11-05 PROCEDURE — 86850 RBC ANTIBODY SCREEN: CPT

## 2021-11-05 PROCEDURE — 85055 RETICULATED PLATELET ASSAY: CPT

## 2021-11-05 PROCEDURE — 700102 HCHG RX REV CODE 250 W/ 637 OVERRIDE(OP): Performed by: NURSE PRACTITIONER

## 2021-11-05 PROCEDURE — A9270 NON-COVERED ITEM OR SERVICE: HCPCS | Performed by: INTERNAL MEDICINE

## 2021-11-05 PROCEDURE — 700102 HCHG RX REV CODE 250 W/ 637 OVERRIDE(OP): Performed by: GENERAL PRACTICE

## 2021-11-05 PROCEDURE — 86923 COMPATIBILITY TEST ELECTRIC: CPT

## 2021-11-05 PROCEDURE — 85007 BL SMEAR W/DIFF WBC COUNT: CPT

## 2021-11-05 PROCEDURE — P9016 RBC LEUKOCYTES REDUCED: HCPCS

## 2021-11-05 PROCEDURE — P9034 PLATELETS, PHERESIS: HCPCS

## 2021-11-05 PROCEDURE — A9270 NON-COVERED ITEM OR SERVICE: HCPCS | Performed by: STUDENT IN AN ORGANIZED HEALTH CARE EDUCATION/TRAINING PROGRAM

## 2021-11-05 PROCEDURE — RXMED WILLOW AMBULATORY MEDICATION CHARGE: Performed by: NURSE PRACTITIONER

## 2021-11-05 PROCEDURE — 85027 COMPLETE CBC AUTOMATED: CPT

## 2021-11-05 PROCEDURE — RXMED WILLOW AMBULATORY MEDICATION CHARGE: Performed by: STUDENT IN AN ORGANIZED HEALTH CARE EDUCATION/TRAINING PROGRAM

## 2021-11-05 PROCEDURE — 86945 BLOOD PRODUCT/IRRADIATION: CPT | Mod: 91

## 2021-11-05 PROCEDURE — 84100 ASSAY OF PHOSPHORUS: CPT

## 2021-11-05 PROCEDURE — 99239 HOSP IP/OBS DSCHRG MGMT >30: CPT | Performed by: NURSE PRACTITIONER

## 2021-11-05 RX ORDER — VORICONAZOLE 200 MG/1
200 TABLET, FILM COATED ORAL 2 TIMES DAILY
Qty: 28 TABLET | Refills: 0 | Status: SHIPPED | OUTPATIENT
Start: 2021-11-05 | End: 2021-11-19

## 2021-11-05 RX ORDER — ACYCLOVIR 200 MG/1
400 CAPSULE ORAL 2 TIMES DAILY
Qty: 80 CAPSULE | Refills: 0 | Status: SHIPPED | OUTPATIENT
Start: 2021-11-05 | End: 2021-11-25

## 2021-11-05 RX ADMIN — FLECAINIDE ACETATE 50 MG: 50 TABLET ORAL at 16:57

## 2021-11-05 RX ADMIN — FERROUS SULFATE TAB 325 MG (65 MG ELEMENTAL FE) 325 MG: 325 (65 FE) TAB at 05:16

## 2021-11-05 RX ADMIN — VORICONAZOLE 200 MG: 200 TABLET ORAL at 16:57

## 2021-11-05 RX ADMIN — GUAIFENESIN 600 MG: 600 TABLET, EXTENDED RELEASE ORAL at 05:15

## 2021-11-05 RX ADMIN — ALBUTEROL SULFATE 2 PUFF: 90 AEROSOL, METERED RESPIRATORY (INHALATION) at 12:25

## 2021-11-05 RX ADMIN — DOCUSATE SODIUM 50 MG AND SENNOSIDES 8.6 MG 2 TABLET: 8.6; 5 TABLET, FILM COATED ORAL at 16:57

## 2021-11-05 RX ADMIN — ALBUTEROL SULFATE 2 PUFF: 90 AEROSOL, METERED RESPIRATORY (INHALATION) at 05:15

## 2021-11-05 RX ADMIN — ALBUTEROL SULFATE 2 PUFF: 90 AEROSOL, METERED RESPIRATORY (INHALATION) at 15:49

## 2021-11-05 RX ADMIN — ALLOPURINOL 300 MG: 300 TABLET ORAL at 05:15

## 2021-11-05 RX ADMIN — VENETOCLAX 100 MG: 100 TABLET, FILM COATED ORAL at 05:17

## 2021-11-05 RX ADMIN — FLECAINIDE ACETATE 50 MG: 50 TABLET ORAL at 05:16

## 2021-11-05 RX ADMIN — ACYCLOVIR 400 MG: 200 CAPSULE ORAL at 16:57

## 2021-11-05 RX ADMIN — Medication 2000 UNITS: at 05:16

## 2021-11-05 RX ADMIN — Medication 1 TABLET: at 05:16

## 2021-11-05 RX ADMIN — FEXOFENADINE HCL 60 MG: 60 TABLET, FILM COATED ORAL at 05:16

## 2021-11-05 RX ADMIN — GUAIFENESIN 600 MG: 600 TABLET, EXTENDED RELEASE ORAL at 16:59

## 2021-11-05 RX ADMIN — ACYCLOVIR 400 MG: 200 CAPSULE ORAL at 05:15

## 2021-11-05 RX ADMIN — VORICONAZOLE 200 MG: 200 TABLET ORAL at 05:16

## 2021-11-05 RX ADMIN — DOCUSATE SODIUM 50 MG AND SENNOSIDES 8.6 MG 2 TABLET: 8.6; 5 TABLET, FILM COATED ORAL at 05:15

## 2021-11-05 ASSESSMENT — ENCOUNTER SYMPTOMS
SHORTNESS OF BREATH: 0
HEADACHES: 0
SPUTUM PRODUCTION: 0
MYALGIAS: 0
NERVOUS/ANXIOUS: 0
NAUSEA: 0
BACK PAIN: 0
FOCAL WEAKNESS: 0
ABDOMINAL PAIN: 0
DEPRESSION: 0
PHOTOPHOBIA: 0
VOMITING: 0
SPEECH CHANGE: 0
ORTHOPNEA: 0
FEVER: 0
DIZZINESS: 0
BLURRED VISION: 0
HEARTBURN: 0
PALPITATIONS: 0
DOUBLE VISION: 0
WEIGHT LOSS: 0
COUGH: 0
BRUISES/BLEEDS EASILY: 0

## 2021-11-05 ASSESSMENT — LIFESTYLE VARIABLES: SUBSTANCE_ABUSE: 0

## 2021-11-05 NOTE — PROGRESS NOTES
Oncology/Hematology Progress Note               Author: Leandro Flores M.D. Date & Time created: 11/5/2021  1:04 PM     CC: Acute myeloid leukemia  Interval History:  The patient is doing very well, No shortness of breath, chest pain, cough.  He has been ambulating in hallway, he does have mild fatigue.   No evidence of any ongoing infection.  No fevers or chills.  Received packed red cell transfusion on 11/3/2021 and tolerated well.      PMHx, PSurgHx, SocHx, FAMHx: Reviewed and unchanged    Review of Systems:  Review of Systems   Constitutional: Positive for malaise/fatigue. Negative for fever and weight loss.   HENT: Positive for hearing loss. Negative for nosebleeds.    Eyes: Negative for blurred vision, double vision and photophobia.   Respiratory: Negative for cough, sputum production and shortness of breath.    Cardiovascular: Negative for chest pain, palpitations and orthopnea.   Gastrointestinal: Negative for abdominal pain, heartburn, nausea and vomiting.   Genitourinary: Negative for dysuria and urgency.   Musculoskeletal: Negative for back pain and myalgias.   Skin: Negative for rash.   Neurological: Negative for dizziness, speech change, focal weakness and headaches.   Endo/Heme/Allergies: Negative for environmental allergies. Does not bruise/bleed easily.   Psychiatric/Behavioral: Negative for depression, substance abuse and suicidal ideas. The patient is not nervous/anxious.        Physical Exam:  Physical Exam  Constitutional:       Appearance: Normal appearance.   HENT:      Head: Normocephalic and atraumatic.      Nose: Nose normal.      Mouth/Throat:      Mouth: Mucous membranes are moist.   Eyes:      Extraocular Movements: Extraocular movements intact.      Conjunctiva/sclera: Conjunctivae normal.   Cardiovascular:      Rate and Rhythm: Normal rate and regular rhythm.      Pulses: Normal pulses.      Heart sounds: Murmur heard.   No gallop.    Pulmonary:      Effort: Pulmonary effort is normal.  No respiratory distress.      Breath sounds: Normal breath sounds. No stridor. No wheezing or rhonchi.   Abdominal:      General: Abdomen is flat. There is no distension.      Palpations: There is no mass.   Musculoskeletal:         General: No swelling or tenderness.      Cervical back: Normal range of motion.   Skin:     General: Skin is warm and dry.      Coloration: Skin is not jaundiced.   Neurological:      General: No focal deficit present.      Mental Status: He is alert and oriented to person, place, and time. Mental status is at baseline.      Cranial Nerves: No cranial nerve deficit.      Sensory: No sensory deficit.   Psychiatric:         Mood and Affect: Mood normal.         Labs:          Recent Labs     21  0303   SODIUM 131* 134* 135   POTASSIUM 4.3 4.2 4.1   CHLORIDE 101 103 105   CO2 23 23 23   BUN 16 14 11   CREATININE 0.62 0.58 0.55   MAGNESIUM 1.6  --   --    PHOSPHORUS 3.0 3.3 3.8   CALCIUM 7.9* 8.0* 7.9*     Recent Labs     210 217 21  0303   ALTSGPT 20 22 21   ASTSGOT 36 37 38   ALKPHOSPHAT 115* 114* 110*   TBILIRUBIN 0.5 0.6 0.5   GLUCOSE 99 86 84     Recent Labs     210 21  0303   RBC 1.99* 2.36* 2.23*   HEMOGLOBIN 6.6* 7.6* 7.3*   HEMATOCRIT 19.8* 22.8* 22.0*   PLATELETCT 22* 18* 14*     Recent Labs     210 217 21  0303   WBC 5.4 5.6 7.0   NEUTSPOLYS 5.50* 16.20* 5.30*   LYMPHOCYTES 70.90* 72.70* 55.70*   MONOCYTES 20.00* 5.10 14.20*   EOSINOPHILS 0.00 0.00 0.00   BASOPHILS 0.00 0.00 0.00   ASTSGOT 36 37 38   ALTSGPT 20 22 21   ALKPHOSPHAT 115* 114* 110*   TBILIRUBIN 0.5 0.6 0.5     Recent Labs     210 21  0303   SODIUM 131* 134* 135   POTASSIUM 4.3 4.2 4.1   CHLORIDE 101 103 105   CO2 23 23 23   GLUCOSE 99 86 84   BUN 16 14 11   CREATININE 0.62 0.58 0.55   CALCIUM 7.9* 8.0* 7.9*     Hemodynamics:  Temp (24hrs), Av.9 °C (98.4 °F),  Min:36.5 °C (97.7 °F), Max:37.1 °C (98.8 °F)  Temperature: 37 °C (98.6 °F)  Pulse  Av.7  Min: 67  Max: 106   Blood Pressure : (!) 98/58 (notified RN)     Respiratory:    Respiration: 18, Pulse Oximetry: 95 %        RML Breath Sounds: Diminished, RLL Breath Sounds: Diminished, LLL Breath Sounds: Diminished  Fluids:    Intake/Output Summary (Last 24 hours) at 10/23/2021 1150  Last data filed at 10/23/2021 0900  Gross per 24 hour   Intake 1310 ml   Output --   Net 1310 ml        GI/Nutrition:  Orders Placed This Encounter   Procedures   • Diet Order Diet: Cardiac     Standing Status:   Standing     Number of Occurrences:   1     Order Specific Question:   Diet:     Answer:   Cardiac [6]     Medical Decision Making, by Problem:  Active Hospital Problems    Diagnosis    • *AML (acute myeloblastic leukemia) (HCC) [C92.00]    • Post-nasal drip [R09.82]    • Elevated LDH [R74.02]    • Hyponatremia [E87.1]    • Leukocytosis [D72.829]    • Thrombocytopenia (HCC) [D69.6]    • Atrial fibrillation (HCC) [I48.91]    • Anemia [D64.9]    • Essential hypertension, benign [I10]      Procedures    PICC line right arm 10/22/2021    Impression  1.  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  Negative mutation status  (IDH1/2, FLT3, NPM 1 and CEBPA). FISH negative.  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  Well-tolerated so far.  Prescription for venetoclax has been sent to the pharmacy.  Venetoclax has been approved by her insurance but approval for posaconazole is still pending  2.  Leukocytosis/anemia/thrombocytopenia all secondary to #1  -Transfuse if hemoglobin less than 7 g/dL or platelets less than 10,000,  Irradiated.  Received PRBC on 11/3/2021  3.  History of atrial fibrillation post cardioversion 2021 on  flecainide.  No chest pain or palpitations    4.  History of hypertension    5.  Covid infection in July 2021 with chronic sinus symptoms  -10/24 repeated Covid test negative by PCR     6.  Tumor lysis prophylaxis  -On allopurinol 300 mg daily    7.  Antibiotic prophylaxis with acyclovir and voriconazole    Plan  -The patient is doing fairly well, day 9 of treatment today.  Continue with Vidaza and venetoclax.   -Use if hemoglobin less than 7 g/dL or platelets less than 10,000,  -Office pharmacy confirmed that prescription has been sent to the pharmacy for venetoclax.  -Continue with prophylactic antibiotics  No signs of tumor lysis syndrome, keep checking daily labs, continue on allopurinol-  -will be discharged when above-mentioned drugs are available for the patient    high complexity/complex decision making      Quality-Core Measures

## 2021-11-05 NOTE — PROGRESS NOTES
David from Lab called with critical result of Platelets 14 at 0340. Critical lab result read back to [facility].   This critical lab result is within parameters established by [facility] Policy for this patient.    David from Lab called with critical result of ANC 0.37 at 0530. Critical lab result read back to [facility].   This critical lab result is within parameters established by [facility] Policy for this patient.      COVID-19 surge in effect.

## 2021-11-05 NOTE — DISCHARGE PLANNING
Anticipated Discharge Disposition: Home to his prior living arrangement with spouse.     Action: Patient was discussed at IDT rounds. Patient is medically cleared for discharge, Dr. Madsen asked this RN,  to follow up with [facility] for two of patient's discharge medications, Voriconazole, patient's co-pay will be 2.33, Venetoclax, 7 tablets will cost 802.92, pharmacy is asking for an approved services, or payment from patient. Meds to Beds will delivery medication to patient prior to discharge if payment received.     This RN,  called patient's spouse Nelida asking if family can afford price of medication, 802.92, Nelida stated yes they can. I gave her [facility] pharmacy phone number directly.     Barriers to Discharge: None, patient can afford the medication cost of 802.92.    Plan: Patient will discharge home today once meds to beds have been delivered to bedside. Spouse will pick patient up.     Julia Garcia RN,

## 2021-11-05 NOTE — DISCHARGE SUMMARY
Discharge Summary    CHIEF COMPLAINT ON ADMISSION  Chief Complaint   Patient presents with   • Abnormal Labs     sent here by PCP Dr. Moreno for low H/H and needs a blood transfusion. Told that Dr. Flores and/or Dr. Anguiano to see patient for possible dx of leukemia.        Reason for Admission  Sent by MD     Admission Date  10/18/2021    CODE STATUS  Full Code    HPI & HOSPITAL COURSE  This is a 76 year old male with PMHx of atrial fibrillation (s/p cardioversion, off AC, Dr. Low), hypertension, hyperlipidemia, and gout who was admitted on 10/18/2021 due to abnormal labs. Patient was seen by cardiology, Dr. Low, patient had a cardioversion in September 2021, patient returned to [facility], patient taken off of Eliquis due to recurrent nosebleeds.  Patient is currently on flecainide.  Patient had 4-5 ER visits due to this recurrent epistaxis, requiring packing, cauterization, and surgical procedure performed by ENT.  Patient is currently off of any anticoagulation.     Patient was sent in by PCP due to findings of leukocytosis, anemia, and thrombocytopenia on repeat labs. Labs on admission noted hemoglobin of 7.4, patient was transfused 1 unit, hemoglobin remained at 7.3. Oncology was consulted. Inpatient bone marrow biopsy performed 10/19. Pathology c/w AML. He was started on Vidaza and Venetoclax on 10/26. His counts were monitored and he received multiple pRBC and PLT transfusions to maintain PLT > 10,000 and Hgb > 7. He remained hemodynamically stable on room air. He was cleared for discharge once oral chemotherapy approved for outpatient. He was transfused 1 unit pRBC and 1 unit PLT at time of discharge and instructed to follow-up at oncology clinic on Monday 11/8 for lab recheck and transfusion as indicated.      Therefore, he is discharged in guarded and stable condition to home with close outpatient follow-up.    The patient met 2-midnight criteria for an inpatient stay at the time of discharge.    Discharge  Date  11/5/2021    FOLLOW UP ITEMS POST DISCHARGE  [facility] on 11/5/2021    DISCHARGE DIAGNOSES  Principal Problem:    AML (acute myeloblastic leukemia) (HCC) POA: Unknown  Active Problems:    Essential hypertension, benign POA: Yes    Anemia POA: Yes    Atrial fibrillation (HCC) POA: Yes    Thrombocytopenia (HCC) POA: Yes    Hyponatremia POA: Unknown  Resolved Problems:    Leukocytosis POA: Yes    Elevated LDH POA: Unknown    Bleeding from wound POA: Unknown    Post-nasal drip POA: Unknown      FOLLOW UP  No future appointments.  [facility]  [address] [facility]  [place] [id number]  [phone]  Go on 11/8/2021  lab-recheck      MEDICATIONS ON DISCHARGE     Medication List      START taking these medications      Instructions   acyclovir 200 MG Caps  Commonly known as: ZOVIRAX   Take 2 Capsules by mouth 2 times a day for 20 days.  Dose: 400 mg     venetoclax 100 MG tablet  Start taking on: November 6, 2021  Commonly known as: VENCLEXTA   Take 1 Tablet by mouth every day for 7 days.  Dose: 100 mg     voriconazole 200 MG Tabs  Commonly known as: VFEND   Take 1 Tablet by mouth 2 times a day for 14 days.  Dose: 200 mg        CONTINUE taking these medications      Instructions   albuterol 108 (90 Base) MCG/ACT Aers inhalation aerosol   albuterol sulfate HFA 90 mcg/actuation aerosol inhaler   INHALE 1 PUFF BY MOUTH EVERY 8 HOURS AS NEEDED FOR WHEEZING     allopurinol 300 MG Tabs  Commonly known as: ZYLOPRIM   TAKE 1 TABLET BY MOUTH EVERY DAY. NEED LAB WORK     atorvastatin 40 MG Tabs  Commonly known as: LIPITOR   Take 40 mg by mouth every day.  Dose: 40 mg     enalapril 10 MG Tabs  Commonly known as: VASOTEC   Doctor's comments: DX Code Needed  .  TAKE 1 TABLET BY MOUTH EVERY DAY. NEEDS LABS     flecainide 50 MG tablet  Commonly known as: TAMBOCOR   Take 50 mg by mouth 2 times a day.  Dose: 50 mg     NON SPECIFIED   Take 600 mg by mouth. Calcium  Dose: 600 mg     oxymetazoline (icn)   0.025%  Commonly known as: AFRIN   Administer 1 Spray into affected nostril(S) every 12 hours. Prn nose bleed  Dose: 1 Spray     Tylenol 8 Hour Arthritis Pain 650 MG CR tablet  Generic drug: acetaminophen   Take 650 mg by mouth every 6 hours.  Dose: 650 mg     Vitamin D3 2000 UNIT Caps   Take 1 Capsule by mouth every day.  Dose: 1 Capsule        STOP taking these medications    cefdinir 300 MG Caps  Commonly known as: OMNICEF     ferrous sulfate 325 (65 Fe) MG tablet            Allergies  Allergies   Allergen Reactions   • Pcn [Penicillins] Hives       DIET  Orders Placed This Encounter   Procedures   • Diet Order Diet: Cardiac     Standing Status:   Standing     Number of Occurrences:   1     Order Specific Question:   Diet:     Answer:   Cardiac [6]       ACTIVITY  As tolerated.  Weight bearing as tolerated    CONSULTATIONS  Oncology     PROCEDURES  10/19/21: BMBx    LABORATORY  Lab Results   Component Value Date    SODIUM 135 11/05/2021    POTASSIUM 4.1 11/05/2021    CHLORIDE 105 11/05/2021    CO2 23 11/05/2021    GLUCOSE 84 11/05/2021    BUN 11 11/05/2021    CREATININE 0.55 11/05/2021    CREATININE 0.9 04/10/2007        Lab Results   Component Value Date    WBC 7.0 11/05/2021    HEMOGLOBIN 7.3 (L) 11/05/2021    HEMATOCRIT 22.0 (L) 11/05/2021    PLATELETCT 14 (LL) 11/05/2021        Total time of the discharge process exceeds 40 minutes.

## 2021-11-06 NOTE — DISCHARGE PLANNING
Meds-to-Beds: Discharge prescription orders listed below delivered to patient's bedside. RN Caro notified. Patient counseled. Patient elected to have co-payment billed to patient account.     Current Outpatient Medications   Medication Sig Dispense Refill   • [START ON 11/6/2021] venetoclax (VENCLEXTA) 100 MG tablet Take 1 Tablet by mouth every day for 7 days. 7 Tablet 0   • voriconazole (VFEND) 200 MG Tab Take 1 Tablet by mouth 2 times a day for 14 days. 28 Tablet 0   • acyclovir (ZOVIRAX) 200 MG Cap Take 2 Capsules by mouth 2 times a day for 20 days. 80 Capsule 0      aMrtina Rivas, PharmD

## 2021-11-06 NOTE — DISCHARGE INSTRUCTIONS
Discharge Instructions    Discharged to home by car with relative. Discharged via wheelchair, hospital escort: Yes.  Special equipment needed: Not Applicable    Be sure to schedule a follow-up appointment with your primary care doctor or any specialists as instructed.   Pt to follow up with [facility] on 11/8 for labwork.     Discharge Plan:   Influenza Vaccine Indication: Indicated: 65 years and older    I understand that a diet low in cholesterol, fat, and sodium is recommended for good health. Unless I have been given specific instructions below for another diet, I accept this instruction as my diet prescription.   Other diet: pt was on cardiac diet in hospital.    Special Instructions: None    · Is patient discharged on Warfarin / Coumadin?   No     Depression / Suicide Risk    As you are discharged from this [facility] facility, it is important to learn how to keep safe from harming yourself.    Recognize the warning signs:  · Abrupt changes in personality, positive or negative- including increase in energy   · Giving away possessions  · Change in eating patterns- significant weight changes-  positive or negative  · Change in sleeping patterns- unable to sleep or sleeping all the time   · Unwillingness or inability to communicate  · Depression  · Unusual sadness, discouragement and loneliness  · Talk of wanting to die  · Neglect of personal appearance   · Rebelliousness- reckless behavior  · Withdrawal from people/activities they love  · Confusion- inability to concentrate     If you or a loved one observes any of these behaviors or has concerns about self-harm, here's what you can do:  · Talk about it- your feelings and reasons for harming yourself  · Remove any means that you might use to hurt yourself (examples: pills, rope, extension cords, firearm)  · Get professional help from the community (Mental Health, Substance Abuse, psychological counseling)  · Do not be alone:Call your Safe Contact- someone whom you  trust who will be there for you.  · Call your local CRISIS HOTLINE 162-0549 or 427-433-7960  · Call your local Children's Mobile Crisis Response Team [place] (007) 639-0125 or www.Devotee  · Call the toll free National Suicide Prevention Hotlines   · National Suicide Prevention Lifeline 364-633-NSIT (2649)  · [employer] Line Network 800-SUICIDE (086-1370)      Neutropenia  Neutropenia is a condition that occurs when you have a lower-than-normal level of a type of white blood cell (neutrophil) in your body. Neutrophils are made in the spongy center of large bones (bone marrow), and they fight infections.  Neutrophils are your body's main defense against bacterial and fungal infections. The fewer neutrophils you have and the longer your body remains without them, the greater your risk of getting a severe infection.  What are the causes?  This condition can occur if your body uses up or destroys neutrophils faster than your bone marrow can make them. Neutropenia may be caused by:  · A bacterial or fungal infection.  · Allergic disorders.  · Reactions to some medicines.  · An autoimmune disease.  · An enlarged spleen.  This condition can also occur if your bone marrow does not produce enough neutrophils. This problem may be caused by:  · Cancer.  · Cancer treatments, such as radiation or chemotherapy.  · Viral infections.  · Medicines, such as phenytoin.  · Vitamin B12 deficiency.  · Diseases of the bone marrow.  · Environmental toxins, such as insecticides.  What are the signs or symptoms?  This condition does not usually cause symptoms. If symptoms are present, they are usually caused by an underlying infection. Symptoms of an infection may include:  · Fever.  · Chills.  · Swollen glands.  · Oral or anal ulcers.  · Cough and shortness of breath.  · Rash.  · Skin infection.  · Fatigue.  How is this diagnosed?  Your health care provider may suspect neutropenia if you have:  · A condition that may cause  neutropenia.  · Symptoms during or after treatment for cancer.  · Symptoms of infection, especially fever.  · Frequent and unusual infections.  This condition is diagnosed based on your medical history and a physical exam. Tests will also be done, such as:  · A complete blood count (CBC).  · A procedure to collect a sample of bone marrow for examination (bone marrow biopsy).  · A chest X-ray.  · A urine culture.  · A blood culture.  How is this treated?  Treatment depends on the underlying cause and severity of your condition. Mild neutropenia may not require treatment. Treatment may include medicines, such as:  · Antibiotic medicine given through an IV.  · Antiviral medicines.  · Antifungal medicines.  · A medicine to increase neutrophil production (colony-stimulating factor). You may get this drug through an IV or by injection.  · Steroids given through an IV.  If an underlying condition is causing neutropenia, you may need treatment for that condition. If medicines or cancer treatments are causing neutropenia, your health care provider may have you stop the medicines or treatment.  Follow these instructions at home:  Medicines    · Take over-the-counter and prescription medicines only as told by your health care provider.  · Get a seasonal flu shot (influenza vaccine).  · Avoid people who received a vaccine in the past 30 days if that vaccine contained a live version of the germ (live vaccine). You should not get a live vaccine. Common live vaccines are polio, MMR, chicken pox, and shingles vaccines.  Eating and drinking  · Do not share food utensils.  · Do not eat unpasteurized foods.  · Do not eat raw or undercooked meat, eggs, or seafood.  · Do not eat unwashed, raw fruits or vegetables.  Lifestyle  · Avoid exposure to groups of people or children.  · Avoid being around people who are sick.  · Avoid being around dirt or dust, such as in construction areas or gardens.  · Do not provide direct care for pets.  Avoid animal droppings. Do not clean litter boxes and bird cages.  · Do not have sex unless your health care provider has approved.  Hygiene    · Bathe daily.  · Clean the area between the genitals and the anus (perineal area) after you urinate or have a bowel movement. If you are female, wipe from front to back.  · Brush your teeth with a soft toothbrush before and after meals.  · Do not use a regular razor. Use an electric razor to remove hair.  · Wash your hands often. Make sure others who come in contact with you also wash their hands. If soap and water are not available, use hand .  General instructions  · Follow any precautions as told by your health care provider to reduce your risk for injury or infection.  · Take actions to avoid cuts and burns. For example:  ? Be cautious when you use knives. Always cut away from yourself.  ? Keep knives in protective sheaths or guards when not in use.  ? Use oven mitts when you cook with a hot stove, oven, or grill.  ? Stand a safe distance away from open fires.  · Do not use tampons, enemas, or rectal suppositories unless your health care provider has approved.  · Keep all follow-up visits as told by your health care provider. This is important.  Contact a health care provider if:  · You have:  ? A sore throat.  ? A warm, red, or tender area on your skin.  ? A cough.  ? Frequent or painful urination.  ? Vaginal discharge or itching.  · You develop:  ? Sores in your mouth or anus.  ? Swollen lymph nodes.  ? Red streaks on the skin.  ? A rash.  Get help right away if:  · You have:  ? A fever.  ? Chills, or you start to shake.  · You feel:  ? Nauseous, or you vomit.  ? Very fatigued.  ? Short of breath.  Summary  · Neutropenia is a condition that occurs when you have a lower-than-normal level of a type of white blood cell (neutrophil) in your body.  · This condition can occur if your body uses up or destroys neutrophils faster than your bone marrow can make  them.  · Treatment depends on the underlying cause and severity of your condition. Mild neutropenia may not require treatment.  · Follow any precautions as told by your health care provider to reduce your risk for injury or infection.  This information is not intended to replace advice given to you by your health care provider. Make sure you discuss any questions you have with your health care provider.  Document Released: 06/09/2003 Document Revised: 10/03/2019 Document Reviewed: 10/03/2019  Elsevier Patient Education © 2020 [employer]

## 2021-11-06 NOTE — PROGRESS NOTES
Blood transfusion complete. PICC pulled per orders. Discharge instructions provided to patient and spouse, all questions answered. Patient discharged to home with spouse via personal car. Patient escorted to car with staff via wheelchair.

## 2021-11-06 NOTE — CARE PLAN
The patient is Stable - Low risk of patient condition declining or worsening    Shift Goals  Clinical Goals: safety  Patient Goals: ambulate, rest    Progress made toward(s) clinical / shift goals:    Problem: Mobility  Goal: Patient's capacity to carry out activities will improve  Outcome: Progressing     Problem: Hemodynamics  Goal: Patient's hemodynamics, fluid balance and neurologic status will be stable or improve  Outcome: Progressing     Problem: Infection - Standard  Goal: Patient will remain free from infection  Outcome: Progressing  Note: Pt on neutropenic precautions. VS, labs monitored; pt afebrile.        Patient is not progressing towards the following goals:

## 2021-11-09 ENCOUNTER — OUTPATIENT INFUSION SERVICES (OUTPATIENT)
Dept: ONCOLOGY | Facility: MEDICAL CENTER | Age: 76
End: 2021-11-09
Attending: INTERNAL MEDICINE
Payer: COMMERCIAL

## 2021-11-09 VITALS
BODY MASS INDEX: 27.34 KG/M2 | RESPIRATION RATE: 16 BRPM | WEIGHT: 174.16 LBS | DIASTOLIC BLOOD PRESSURE: 51 MMHG | HEIGHT: 67 IN | HEART RATE: 93 BPM | OXYGEN SATURATION: 97 % | SYSTOLIC BLOOD PRESSURE: 102 MMHG | TEMPERATURE: 98.7 F

## 2021-11-09 DIAGNOSIS — C92.00 ACUTE MYELOID LEUKEMIA NOT HAVING ACHIEVED REMISSION (HCC): ICD-10-CM

## 2021-11-09 LAB
BARCODED ABORH UBTYP: 6200
BARCODED PRD CODE UBPRD: NORMAL
BARCODED UNIT NUM UBUNT: NORMAL
COMPONENT P 8504P: NORMAL
PRODUCT TYPE UPROD: NORMAL
UNIT STATUS USTAT: NORMAL

## 2021-11-09 PROCEDURE — P9034 PLATELETS, PHERESIS: HCPCS

## 2021-11-09 PROCEDURE — A9270 NON-COVERED ITEM OR SERVICE: HCPCS | Performed by: INTERNAL MEDICINE

## 2021-11-09 PROCEDURE — 36430 TRANSFUSION BLD/BLD COMPNT: CPT

## 2021-11-09 PROCEDURE — 306780 HCHG STAT FOR TRANSFUSION PER CASE

## 2021-11-09 PROCEDURE — 86945 BLOOD PRODUCT/IRRADIATION: CPT

## 2021-11-09 PROCEDURE — 700102 HCHG RX REV CODE 250 W/ 637 OVERRIDE(OP): Performed by: INTERNAL MEDICINE

## 2021-11-09 RX ORDER — ACETAMINOPHEN 325 MG/1
650 TABLET ORAL ONCE
Status: CANCELLED | OUTPATIENT
Start: 2021-11-09

## 2021-11-09 RX ORDER — 0.9 % SODIUM CHLORIDE 0.9 %
3 VIAL (ML) INJECTION PRN
Status: CANCELLED | OUTPATIENT
Start: 2021-11-09

## 2021-11-09 RX ORDER — SODIUM CHLORIDE 9 MG/ML
INJECTION, SOLUTION INTRAVENOUS CONTINUOUS
Status: CANCELLED | OUTPATIENT
Start: 2021-11-09

## 2021-11-09 RX ORDER — 0.9 % SODIUM CHLORIDE 0.9 %
10 VIAL (ML) INJECTION PRN
Status: CANCELLED | OUTPATIENT
Start: 2021-11-09

## 2021-11-09 RX ORDER — HEPARIN SODIUM (PORCINE) LOCK FLUSH IV SOLN 100 UNIT/ML 100 UNIT/ML
500 SOLUTION INTRAVENOUS PRN
Status: CANCELLED | OUTPATIENT
Start: 2021-11-09

## 2021-11-09 RX ORDER — DIPHENHYDRAMINE HCL 25 MG
25 TABLET ORAL ONCE
Status: CANCELLED | OUTPATIENT
Start: 2021-11-09 | End: 2021-11-09

## 2021-11-09 RX ORDER — ACETAMINOPHEN 325 MG/1
650 TABLET ORAL PRN
Status: CANCELLED | OUTPATIENT
Start: 2021-11-09

## 2021-11-09 RX ORDER — SODIUM CHLORIDE 9 MG/ML
INJECTION, SOLUTION INTRAVENOUS CONTINUOUS
Status: DISCONTINUED | OUTPATIENT
Start: 2021-11-09 | End: 2021-11-09 | Stop reason: HOSPADM

## 2021-11-09 RX ORDER — DIPHENHYDRAMINE HYDROCHLORIDE 50 MG/ML
25 INJECTION INTRAMUSCULAR; INTRAVENOUS PRN
Status: CANCELLED | OUTPATIENT
Start: 2021-11-09

## 2021-11-09 RX ORDER — DIPHENHYDRAMINE HCL 25 MG
25 TABLET ORAL ONCE
Status: COMPLETED | OUTPATIENT
Start: 2021-11-09 | End: 2021-11-09

## 2021-11-09 RX ORDER — 0.9 % SODIUM CHLORIDE 0.9 %
VIAL (ML) INJECTION PRN
Status: CANCELLED | OUTPATIENT
Start: 2021-11-09

## 2021-11-09 RX ORDER — ACETAMINOPHEN 325 MG/1
650 TABLET ORAL ONCE
Status: COMPLETED | OUTPATIENT
Start: 2021-11-09 | End: 2021-11-09

## 2021-11-09 RX ADMIN — ACETAMINOPHEN 650 MG: 325 TABLET ORAL at 15:35

## 2021-11-09 RX ADMIN — DIPHENHYDRAMINE HYDROCHLORIDE 25 MG: 25 TABLET ORAL at 15:35

## 2021-11-09 ASSESSMENT — FIBROSIS 4 INDEX: FIB4 SCORE: 45.61

## 2021-11-10 NOTE — PROGRESS NOTES
Kevin presented to [facility] for a platelet transfusion.  Today at Dr. Anugiano's office, Kevin's platelet count was 12,000, meeting parameters for 1 unit of platelets.   PIV placed, flushed easily, keiko briskly. Pre-medications administered per MAR. Platelets transfused without adverse event. PIV removed, catheter tip remained intact. Gauze and coban to site. Patient left [facility] in no apparent distress. Aleksandra Trivedi RN will contact patient tomorrow to schedule further appointments per the blood therapy plan.

## 2021-12-27 ENCOUNTER — HOSPITAL ENCOUNTER (OUTPATIENT)
Facility: MEDICAL CENTER | Age: 76
End: 2021-12-27
Attending: NURSE PRACTITIONER
Payer: COMMERCIAL

## 2021-12-27 LAB
FORWARD REASON: SPWHY: NORMAL
FORWARDED TO LAB: SPWHR: NORMAL
SPECIMEN SENT (2ND): SPWT2: NORMAL
SPECIMEN SENT (3RD): SPWT3: NORMAL
SPECIMEN SENT (4TH): SPWT4: NORMAL
SPECIMEN SENT: SPWT1: NORMAL

## 2022-07-28 NOTE — PROGRESS NOTES
This medical record contains text that has been entered with the assistance of computer voice recognition and dictation software.  Therefore, it may contain unintended errors in text, spelling, punctuation, or grammar      Chief Complaint   Patient presents with   • Epistaxis         Alfonso Gaspar is a 76 y.o. male here evaluation and management of: Nosebleeds      HPI:     1. Bleeding from the nose  NEW UNDIAGNOSED PROBLEM    2. Chronic atrial fibrillation (HCC)    The patient is a very pleasant 76-year-old male with significant past medical history of atrial fibrillation currently on Eliquis 5 mg p.o. twice daily.  Patient states he had to go to the emergency room because he could not stop his nosebleed at home.  It started about 7 days ago he did stop the first 1 but 2 days later it started bleeding again and he could not stop it so it was treated in the emergency room at [facility].  He states it was packed and pumped up to cause a blockade.  He denies any blood in the stool no dark tarry stool.  He has to be on anticoagulation due to atrial fibrillation.  His cardiologist is at [facility].      Current medicines (including changes today)  Current Outpatient Medications   Medication Sig Dispense Refill   • oxymetazoline, icn,  0.025% (AFRIN) Administer 1 Spray into affected nostril(S) every 12 hours. Prn nose bleed 1 Bottle 2   • ELIQUIS 5 MG Tab      • HYDROcodone-acetaminophen (NORCO) 5-325 MG Tab per tablet hydrocodone 5 mg-acetaminophen 325 mg tablet   TAKE 1 TABLET EVERY 6 HOURS BY ORAL ROUTE FOR 7 DAYS. M17.9     • traMADol (ULTRAM) 50 MG Tab Take 1 tablet by mouth 3 times a day as needed for up to 30 days. Prn gout attack 90 tablet 0   • atorvastatin (LIPITOR) 40 MG Tab Take 40 mg by mouth every day.     • enalapril (VASOTEC) 10 MG Tab TAKE 1 TABLET BY MOUTH EVERY DAY. NEEDS LABS 90 Tab 3   • allopurinol (ZYLOPRIM) 300 MG Tab TAKE 1 TABLET BY MOUTH EVERY DAY. NEED LAB WORK 90 Tab 3   • Calcium  "Carbonate-Vitamin D (CALCIUM-D) 600-400 MG-UNIT Tab Take 2 Tabs by mouth every day.       No current facility-administered medications for this visit.     He  has a past medical history of Gout, Gout, and Hypertension.  He  has a past surgical history that includes pr removal spleen, total and pr removal of tonsils,<10 y/o.  Social History     Tobacco Use   • Smoking status: Former Smoker     Packs/day: 0.25     Years: 10.00     Pack years: 2.50     Types: Cigarettes     Quit date: 1975     Years since quittin.4   • Smokeless tobacco: Never Used   Substance Use Topics   • Alcohol use: Yes     Alcohol/week: 4.2 oz     Types: 2 Glasses of wine, 5 Cans of beer per week     Comment: occ   • Drug use: No     Social History     Social History Narrative   • Not on file     Family History   Problem Relation Age of Onset   • Diabetes Father    • No Known Problems Maternal Grandmother    • Cancer Maternal Grandfather         Lung cancer   • No Known Problems Paternal Grandmother    • No Known Problems Paternal Grandfather    • Other Sister         polio     Family Status   Relation Name Status   • Fa   at age 74        heart attack   • Mo     • Sis  Alive   • MGMo     • MGFa     • PGMo     • PGFa     • Sis  Alive         ROS    The pertinent  ROS findings can be seen in the HPI above.     All other systems reviewed and are negative     Objective:     /64 (BP Location: Right arm, Patient Position: Sitting, BP Cuff Size: Adult)   Pulse 74   Temp 36.3 °C (97.4 °F) (Temporal)   Resp 14   Ht 1.778 m (5' 10\")   Wt 79.2 kg (174 lb 9.6 oz)   SpO2 94%  Body mass index is 25.05 kg/m².      Physical Exam:    Constitutional: Alert, no distress.  Skin: Ecchymosis noted on bilateral forearms from bumping into objects  Eye: Equal, round and reactive, conjunctiva clear, lids normal.  ENMT: Lips without lesions, good dentition, oropharynx clear.  Neck: Trachea midline, no " masses, no thyromegaly. No cervical or supraclavicular lymphadenopathy.  Respiratory: Unlabored respiratory effort, lungs clear to auscultation, no wheezes, no ronchi.  Cardiovascular: Normal S1, S2, no murmur, no edema  Abdomen: Soft, non-tender, no masses, no hepatosplenomegaly.        Assessment and Plan:   The following treatment plan was discussed      1. Bleeding from the nose  We discussed that the most common cause of nosebleeding is nose picking.  Since low moisture in the ambient air can cause mucosal dryness and irritation, the patient was encouraged to use thin film of Neosporin in bilateral nares nightly, and to alternate this with Aquaphor    - oxymetazoline, icn,  0.025% (AFRIN); Administer 1 Spray into affected nostril(S) every 12 hours. Prn nose bleed  Dispense: 1 Bottle; Refill: 2    2. Chronic atrial fibrillation (HCC)    He is to also follow-up with his cardiologist  To discuss possible ablation and/or switch anticoagulation.  He is not to stop until he has a discussion with his cardiologist.    - REFERRAL TO CARDIOLOGY            Instructed to Follow up in clinic or ER for worsening symptoms, difficulty breathing, lack of expected recovery, or should new symptoms or problems arise.    Followup: Return in about 3 months (around 7/20/2021) for Reevaluation, labs.              Partial Purse String (Intermediate) Text: Given the location of the defect and the characteristics of the surrounding skin an intermediate purse string closure was deemed most appropriate.  Undermining was performed circumferentially around the surgical defect.  A purse string suture was then placed and tightened. Wound tension of the circular defect prevented complete closure of the wound.

## (undated) DEVICE — SET LEADWIRE 5 LEAD BEDSIDE DISPOSABLE ECG (1SET OF 5/EA)

## (undated) DEVICE — SUCTION INSTRUMENT YANKAUER BULBOUS TIP W/O VENT (50EA/CA)

## (undated) DEVICE — TUBE CONNECTING SUCTION - CLEAR PLASTIC STERILE 72 IN (50EA/CA)

## (undated) DEVICE — TOWEL STOP TIMEOUT SAFETY FLAG (40EA/CA)

## (undated) DEVICE — BANDAID SHEER STRIP 3/4 IN (100EA/BX 12BX/CA)

## (undated) DEVICE — SOD. CHL. INJ. 0.9% 1000 ML - (14EA/CA 60CA/PF)

## (undated) DEVICE — TUBING CLEARLINK DUO-VENT - C-FLO (48EA/CA)

## (undated) DEVICE — ELECTRODE 850 FOAM ADHESIVE - HYDROGEL RADIOTRNSPRNT (50/PK)

## (undated) DEVICE — CUSHION EAR E-Z WRAP NASAL CANNULA - (25/CA)

## (undated) DEVICE — SOD. CHL 10CC SYRINGE PREFILL - W/10 CC (30/BX)

## (undated) DEVICE — SENSOR SPO2 ADULT LNCS ADTX (20/BX) ORDER ITEM #19593

## (undated) DEVICE — CANISTER SUCTION RIGID RED 1500CC (40EA/CA)

## (undated) DEVICE — SYRINGE 3 CC 22 GA X 1-1/2 - NDL SAFETY (50/BX 8BX/CA)

## (undated) DEVICE — SYRINGE NON SAFETY 5 CC 20 GA X 1-1/2 IN (100/BX 4BX/CA)